# Patient Record
Sex: FEMALE | Race: WHITE | NOT HISPANIC OR LATINO
[De-identification: names, ages, dates, MRNs, and addresses within clinical notes are randomized per-mention and may not be internally consistent; named-entity substitution may affect disease eponyms.]

---

## 2017-01-27 ENCOUNTER — APPOINTMENT (OUTPATIENT)
Dept: ENDOCRINOLOGY | Facility: CLINIC | Age: 72
End: 2017-01-27

## 2017-01-27 VITALS
OXYGEN SATURATION: 98 % | BODY MASS INDEX: 25.4 KG/M2 | HEIGHT: 62 IN | SYSTOLIC BLOOD PRESSURE: 122 MMHG | HEART RATE: 83 BPM | WEIGHT: 138 LBS | DIASTOLIC BLOOD PRESSURE: 58 MMHG

## 2017-01-27 RX ORDER — DENOSUMAB 60 MG/ML
60 INJECTION SUBCUTANEOUS
Qty: 1 | Refills: 0 | Status: COMPLETED | OUTPATIENT
Start: 2017-01-27

## 2017-01-27 RX ADMIN — DENOSUMAB 0 MG/ML: 60 INJECTION SUBCUTANEOUS at 00:00

## 2017-01-31 RX ORDER — DENOSUMAB 60 MG/ML
60 INJECTION SUBCUTANEOUS
Qty: 1 | Refills: 0 | Status: COMPLETED | OUTPATIENT
Start: 2017-01-31

## 2017-01-31 RX ADMIN — DENOSUMAB 0 MG/ML: 60 INJECTION SUBCUTANEOUS at 00:00

## 2017-02-21 ENCOUNTER — OUTPATIENT (OUTPATIENT)
Dept: OUTPATIENT SERVICES | Facility: HOSPITAL | Age: 72
LOS: 1 days | End: 2017-02-21
Payer: MEDICARE

## 2017-02-21 ENCOUNTER — APPOINTMENT (OUTPATIENT)
Dept: MRI IMAGING | Facility: CLINIC | Age: 72
End: 2017-02-21

## 2017-02-21 DIAGNOSIS — Z00.8 ENCOUNTER FOR OTHER GENERAL EXAMINATION: ICD-10-CM

## 2017-02-21 PROCEDURE — 77059 MRI BREAST BILATERAL: CPT | Mod: 26

## 2017-02-21 PROCEDURE — C8908: CPT

## 2017-02-21 PROCEDURE — A9585: CPT

## 2017-06-02 ENCOUNTER — RX RENEWAL (OUTPATIENT)
Age: 72
End: 2017-06-02

## 2017-07-28 ENCOUNTER — APPOINTMENT (OUTPATIENT)
Dept: ENDOCRINOLOGY | Facility: CLINIC | Age: 72
End: 2017-07-28
Payer: MEDICARE

## 2017-07-28 VITALS
BODY MASS INDEX: 24.66 KG/M2 | HEART RATE: 84 BPM | WEIGHT: 134 LBS | DIASTOLIC BLOOD PRESSURE: 50 MMHG | OXYGEN SATURATION: 95 % | HEIGHT: 62 IN | SYSTOLIC BLOOD PRESSURE: 106 MMHG

## 2017-07-28 PROCEDURE — 96372 THER/PROPH/DIAG INJ SC/IM: CPT

## 2017-07-28 PROCEDURE — 99214 OFFICE O/P EST MOD 30 MIN: CPT | Mod: 25

## 2017-07-28 RX ORDER — AZITHROMYCIN 250 MG/1
250 TABLET, FILM COATED ORAL
Qty: 6 | Refills: 0 | Status: COMPLETED | COMMUNITY
Start: 2017-06-02

## 2017-07-28 RX ORDER — MUPIROCIN 20 MG/G
2 OINTMENT TOPICAL
Qty: 22 | Refills: 0 | Status: COMPLETED | COMMUNITY
Start: 2017-02-04

## 2017-07-28 RX ORDER — LEVOFLOXACIN 500 MG/1
500 TABLET, FILM COATED ORAL
Qty: 7 | Refills: 0 | Status: COMPLETED | COMMUNITY
Start: 2017-02-02

## 2017-07-28 RX ORDER — MOMETASONE 50 UG/1
50 SPRAY, METERED NASAL
Qty: 17 | Refills: 0 | Status: COMPLETED | COMMUNITY
Start: 2017-02-02

## 2017-07-28 RX ORDER — BENZONATATE 100 MG/1
100 CAPSULE ORAL
Qty: 45 | Refills: 0 | Status: COMPLETED | COMMUNITY
Start: 2017-06-02

## 2017-07-28 RX ORDER — METHENAMINE HIPPURATE 1 G/1
1 TABLET ORAL
Qty: 90 | Refills: 0 | Status: ACTIVE | COMMUNITY
Start: 2017-07-07

## 2017-07-28 RX ORDER — PROMETHAZINE HYDROCHLORIDE AND CODEINE PHOSPHATE 6.25; 1 MG/5ML; MG/5ML
6.25-1 SOLUTION ORAL
Qty: 70 | Refills: 0 | Status: COMPLETED | COMMUNITY
Start: 2017-02-02

## 2017-07-28 RX ORDER — CLARITHROMYCIN 500 MG/1
500 TABLET, FILM COATED ORAL
Qty: 14 | Refills: 0 | Status: COMPLETED | COMMUNITY
Start: 2017-02-10

## 2017-07-28 RX ORDER — DENOSUMAB 60 MG/ML
60 INJECTION SUBCUTANEOUS
Qty: 1 | Refills: 0 | Status: COMPLETED | OUTPATIENT
Start: 2017-07-28

## 2017-07-28 RX ADMIN — DENOSUMAB 0 MG/ML: 60 INJECTION SUBCUTANEOUS at 00:00

## 2017-07-31 LAB
25(OH)D3 SERPL-MCNC: 28.4 NG/ML
ALBUMIN SERPL ELPH-MCNC: 4.8 G/DL
ALP BLD-CCNC: 91 U/L
ALT SERPL-CCNC: 58 U/L
ANION GAP SERPL CALC-SCNC: 17 MMOL/L
AST SERPL-CCNC: 61 U/L
BILIRUB SERPL-MCNC: 0.8 MG/DL
BUN SERPL-MCNC: 12 MG/DL
CALCIUM SERPL-MCNC: 9.7 MG/DL
CALCIUM SERPL-MCNC: 9.7 MG/DL
CHLORIDE SERPL-SCNC: 104 MMOL/L
CO2 SERPL-SCNC: 22 MMOL/L
CREAT SERPL-MCNC: 0.81 MG/DL
GLUCOSE SERPL-MCNC: 100 MG/DL
MAGNESIUM SERPL-MCNC: 2.1 MG/DL
PARATHYROID HORMONE INTACT: 64 PG/ML
POTASSIUM SERPL-SCNC: 4.7 MMOL/L
PROT SERPL-MCNC: 7.6 G/DL
SODIUM SERPL-SCNC: 143 MMOL/L
TSH SERPL-ACNC: 2.87 UIU/ML

## 2017-08-01 ENCOUNTER — APPOINTMENT (OUTPATIENT)
Dept: MAMMOGRAPHY | Facility: CLINIC | Age: 72
End: 2017-08-01
Payer: MEDICARE

## 2017-08-01 ENCOUNTER — OUTPATIENT (OUTPATIENT)
Dept: OUTPATIENT SERVICES | Facility: HOSPITAL | Age: 72
LOS: 1 days | End: 2017-08-01
Payer: MEDICARE

## 2017-08-01 ENCOUNTER — APPOINTMENT (OUTPATIENT)
Dept: ULTRASOUND IMAGING | Facility: CLINIC | Age: 72
End: 2017-08-01
Payer: MEDICARE

## 2017-08-01 DIAGNOSIS — Z00.8 ENCOUNTER FOR OTHER GENERAL EXAMINATION: ICD-10-CM

## 2017-08-01 PROCEDURE — 77063 BREAST TOMOSYNTHESIS BI: CPT | Mod: 26

## 2017-08-01 PROCEDURE — 77063 BREAST TOMOSYNTHESIS BI: CPT

## 2017-08-01 PROCEDURE — G0202: CPT | Mod: 26

## 2017-08-01 PROCEDURE — 76641 ULTRASOUND BREAST COMPLETE: CPT | Mod: 26,50

## 2017-08-01 PROCEDURE — 77067 SCR MAMMO BI INCL CAD: CPT

## 2017-08-01 PROCEDURE — 76641 ULTRASOUND BREAST COMPLETE: CPT

## 2017-08-21 ENCOUNTER — RX RENEWAL (OUTPATIENT)
Age: 72
End: 2017-08-21

## 2017-09-06 ENCOUNTER — APPOINTMENT (OUTPATIENT)
Dept: ENDOCRINOLOGY | Facility: CLINIC | Age: 72
End: 2017-09-06

## 2017-11-13 ENCOUNTER — APPOINTMENT (OUTPATIENT)
Dept: SURGERY | Facility: CLINIC | Age: 72
End: 2017-11-13
Payer: MEDICARE

## 2017-11-13 PROCEDURE — 99213K: CUSTOM

## 2018-01-30 ENCOUNTER — OUTPATIENT (OUTPATIENT)
Dept: OUTPATIENT SERVICES | Facility: HOSPITAL | Age: 73
LOS: 1 days | End: 2018-01-30
Payer: MEDICARE

## 2018-01-30 ENCOUNTER — APPOINTMENT (OUTPATIENT)
Dept: MRI IMAGING | Facility: CLINIC | Age: 73
End: 2018-01-30
Payer: MEDICARE

## 2018-01-30 DIAGNOSIS — Z00.8 ENCOUNTER FOR OTHER GENERAL EXAMINATION: ICD-10-CM

## 2018-01-30 PROCEDURE — 77059 MRI BREAST BILATERAL: CPT | Mod: 26

## 2018-01-30 PROCEDURE — 82565 ASSAY OF CREATININE: CPT

## 2018-01-30 PROCEDURE — A9585: CPT

## 2018-01-30 PROCEDURE — C8937: CPT

## 2018-01-30 PROCEDURE — 0159T: CPT | Mod: 26

## 2018-01-30 PROCEDURE — C8908: CPT

## 2018-02-09 ENCOUNTER — LABORATORY RESULT (OUTPATIENT)
Age: 73
End: 2018-02-09

## 2018-02-09 ENCOUNTER — APPOINTMENT (OUTPATIENT)
Dept: ENDOCRINOLOGY | Facility: CLINIC | Age: 73
End: 2018-02-09
Payer: MEDICARE

## 2018-02-09 VITALS
DIASTOLIC BLOOD PRESSURE: 50 MMHG | WEIGHT: 133 LBS | SYSTOLIC BLOOD PRESSURE: 100 MMHG | HEART RATE: 91 BPM | BODY MASS INDEX: 24.17 KG/M2 | HEIGHT: 62.25 IN | OXYGEN SATURATION: 98 %

## 2018-02-09 PROCEDURE — 96372 THER/PROPH/DIAG INJ SC/IM: CPT

## 2018-02-09 PROCEDURE — ZZZZZ: CPT

## 2018-02-09 PROCEDURE — 99214 OFFICE O/P EST MOD 30 MIN: CPT | Mod: 25

## 2018-02-09 PROCEDURE — 77080 DXA BONE DENSITY AXIAL: CPT | Mod: GA

## 2018-02-09 RX ORDER — DENOSUMAB 60 MG/ML
60 INJECTION SUBCUTANEOUS
Qty: 0 | Refills: 0 | Status: COMPLETED | OUTPATIENT
Start: 2018-02-09

## 2018-02-09 RX ORDER — BROMPHENIRAMINE MALEATE, PSEUDOEPHEDRINE HYDROCHLORIDE, 2; 30; 10 MG/5ML; MG/5ML; MG/5ML
30-2-10 SYRUP ORAL
Qty: 200 | Refills: 0 | Status: COMPLETED | COMMUNITY
Start: 2017-12-11

## 2018-02-09 RX ORDER — DENOSUMAB 60 MG/ML
60 INJECTION SUBCUTANEOUS
Refills: 0 | Status: ACTIVE | COMMUNITY

## 2018-02-09 RX ADMIN — DENOSUMAB 0 MG/ML: 60 INJECTION SUBCUTANEOUS at 00:00

## 2018-02-12 LAB
25(OH)D3 SERPL-MCNC: 44.6 NG/ML
ALBUMIN SERPL ELPH-MCNC: 4.6 G/DL
ALP BLD-CCNC: 79 U/L
ALT SERPL-CCNC: 26 U/L
ANION GAP SERPL CALC-SCNC: 16 MMOL/L
AST SERPL-CCNC: 31 U/L
BILIRUB SERPL-MCNC: 0.9 MG/DL
BUN SERPL-MCNC: 15 MG/DL
CALCIUM SERPL-MCNC: 9 MG/DL
CALCIUM SERPL-MCNC: 9 MG/DL
CHLORIDE SERPL-SCNC: 102 MMOL/L
CO2 SERPL-SCNC: 23 MMOL/L
CREAT SERPL-MCNC: 0.82 MG/DL
GLUCOSE SERPL-MCNC: 76 MG/DL
PARATHYROID HORMONE INTACT: 61 PG/ML
POTASSIUM SERPL-SCNC: 4.9 MMOL/L
PROT SERPL-MCNC: 7.4 G/DL
SODIUM SERPL-SCNC: 141 MMOL/L
T3RU NFR SERPL: 1.03 INDEX
T4 SERPL-MCNC: 6.6 UG/DL
TSH SERPL-ACNC: 2.44 UIU/ML

## 2018-04-22 ENCOUNTER — TRANSCRIPTION ENCOUNTER (OUTPATIENT)
Age: 73
End: 2018-04-22

## 2018-05-29 ENCOUNTER — RX RENEWAL (OUTPATIENT)
Age: 73
End: 2018-05-29

## 2018-08-03 ENCOUNTER — APPOINTMENT (OUTPATIENT)
Dept: MAMMOGRAPHY | Facility: CLINIC | Age: 73
End: 2018-08-03

## 2018-08-03 ENCOUNTER — APPOINTMENT (OUTPATIENT)
Dept: ULTRASOUND IMAGING | Facility: CLINIC | Age: 73
End: 2018-08-03

## 2018-08-07 ENCOUNTER — APPOINTMENT (OUTPATIENT)
Dept: ULTRASOUND IMAGING | Facility: CLINIC | Age: 73
End: 2018-08-07
Payer: MEDICARE

## 2018-08-07 ENCOUNTER — APPOINTMENT (OUTPATIENT)
Dept: MAMMOGRAPHY | Facility: CLINIC | Age: 73
End: 2018-08-07
Payer: MEDICARE

## 2018-08-07 ENCOUNTER — OUTPATIENT (OUTPATIENT)
Dept: OUTPATIENT SERVICES | Facility: HOSPITAL | Age: 73
LOS: 1 days | End: 2018-08-07
Payer: MEDICARE

## 2018-08-07 DIAGNOSIS — Z00.8 ENCOUNTER FOR OTHER GENERAL EXAMINATION: ICD-10-CM

## 2018-08-07 PROCEDURE — 77063 BREAST TOMOSYNTHESIS BI: CPT | Mod: 26

## 2018-08-07 PROCEDURE — 76641 ULTRASOUND BREAST COMPLETE: CPT | Mod: 26,50

## 2018-08-07 PROCEDURE — 76641 ULTRASOUND BREAST COMPLETE: CPT

## 2018-08-07 PROCEDURE — 77067 SCR MAMMO BI INCL CAD: CPT

## 2018-08-07 PROCEDURE — 77063 BREAST TOMOSYNTHESIS BI: CPT

## 2018-08-07 PROCEDURE — 77067 SCR MAMMO BI INCL CAD: CPT | Mod: 26

## 2018-08-10 ENCOUNTER — APPOINTMENT (OUTPATIENT)
Dept: ENDOCRINOLOGY | Facility: CLINIC | Age: 73
End: 2018-08-10
Payer: MEDICARE

## 2018-08-10 VITALS
DIASTOLIC BLOOD PRESSURE: 50 MMHG | OXYGEN SATURATION: 95 % | SYSTOLIC BLOOD PRESSURE: 106 MMHG | HEART RATE: 84 BPM | BODY MASS INDEX: 24.71 KG/M2 | HEIGHT: 62.25 IN | WEIGHT: 136 LBS

## 2018-08-10 PROCEDURE — 96372 THER/PROPH/DIAG INJ SC/IM: CPT

## 2018-08-10 PROCEDURE — 99214 OFFICE O/P EST MOD 30 MIN: CPT | Mod: 25

## 2018-08-10 RX ORDER — DENOSUMAB 60 MG/ML
60 INJECTION SUBCUTANEOUS
Qty: 0 | Refills: 0 | Status: COMPLETED | OUTPATIENT
Start: 2018-08-10

## 2018-08-10 RX ADMIN — DENOSUMAB 0 MG/ML: 60 INJECTION SUBCUTANEOUS at 00:00

## 2018-08-13 LAB
25(OH)D3 SERPL-MCNC: 39.6 NG/ML
ALBUMIN SERPL ELPH-MCNC: 4.4 G/DL
ALP BLD-CCNC: 70 U/L
ALT SERPL-CCNC: 33 U/L
ANION GAP SERPL CALC-SCNC: 17 MMOL/L
AST SERPL-CCNC: 40 U/L
BILIRUB SERPL-MCNC: 0.8 MG/DL
BUN SERPL-MCNC: 14 MG/DL
CALCIUM SERPL-MCNC: 8.8 MG/DL
CALCIUM SERPL-MCNC: 8.8 MG/DL
CHLORIDE SERPL-SCNC: 101 MMOL/L
CO2 SERPL-SCNC: 22 MMOL/L
CREAT SERPL-MCNC: 0.8 MG/DL
GLUCOSE SERPL-MCNC: 78 MG/DL
PARATHYROID HORMONE INTACT: 68 PG/ML
POTASSIUM SERPL-SCNC: 4.5 MMOL/L
PROT SERPL-MCNC: 6.9 G/DL
SODIUM SERPL-SCNC: 140 MMOL/L

## 2018-11-14 ENCOUNTER — APPOINTMENT (OUTPATIENT)
Dept: SURGERY | Facility: CLINIC | Age: 73
End: 2018-11-14
Payer: MEDICARE

## 2018-11-14 PROCEDURE — 99213K: CUSTOM

## 2019-02-13 ENCOUNTER — APPOINTMENT (OUTPATIENT)
Dept: ENDOCRINOLOGY | Facility: CLINIC | Age: 74
End: 2019-02-13
Payer: MEDICARE

## 2019-02-13 VITALS
OXYGEN SATURATION: 98 % | SYSTOLIC BLOOD PRESSURE: 112 MMHG | WEIGHT: 133.31 LBS | HEART RATE: 84 BPM | DIASTOLIC BLOOD PRESSURE: 58 MMHG | BODY MASS INDEX: 24.22 KG/M2 | HEIGHT: 62.25 IN

## 2019-02-13 PROCEDURE — 96372 THER/PROPH/DIAG INJ SC/IM: CPT

## 2019-02-13 PROCEDURE — 99214 OFFICE O/P EST MOD 30 MIN: CPT | Mod: 25

## 2019-02-13 RX ORDER — DENOSUMAB 60 MG/ML
60 INJECTION SUBCUTANEOUS
Qty: 1 | Refills: 0 | Status: COMPLETED | OUTPATIENT
Start: 2019-02-13

## 2019-02-13 RX ADMIN — DENOSUMAB 0 MG/ML: 60 INJECTION SUBCUTANEOUS at 00:00

## 2019-02-14 ENCOUNTER — OUTPATIENT (OUTPATIENT)
Dept: OUTPATIENT SERVICES | Facility: HOSPITAL | Age: 74
LOS: 1 days | End: 2019-02-14
Payer: MEDICARE

## 2019-02-14 ENCOUNTER — APPOINTMENT (OUTPATIENT)
Dept: MRI IMAGING | Facility: CLINIC | Age: 74
End: 2019-02-14
Payer: MEDICARE

## 2019-02-14 DIAGNOSIS — Z00.8 ENCOUNTER FOR OTHER GENERAL EXAMINATION: ICD-10-CM

## 2019-02-14 PROCEDURE — A9585: CPT

## 2019-02-14 PROCEDURE — 77049 MRI BREAST C-+ W/CAD BI: CPT | Mod: 26

## 2019-02-14 PROCEDURE — C8937: CPT

## 2019-02-14 PROCEDURE — C8908: CPT

## 2019-02-14 NOTE — ASSESSMENT
[FreeTextEntry1] : 73 year-old female with \par \par 1. Postmenopausal osteoporosis: h/o low bone density. Last dose of IV Reclast 2012. BMD significantly decreased in femoral neck and total hip vs prior study. Options of therapy reviewed. Pt initially elected to take Reclast as she tolerated this well in the past. Referred to rheumatology but decided not to take Reclast on advice of DDS. Began Prolia January 2017 tolerating well. No ONJ. No thigh pain. No interval fx. Repeat bone density 2018 stabilized. Continue Prolia buy and bill. Repeat BMD in Feb 2020.\par \par 2. Small goiter on exam: pt clinically and chemically euthyroid. US shows no nodules. No local neck pain or dysphagia. TFTs Feb 2018 normal. \par \par 3. H/o low Vit D: on Ergo 50,000 per week. \par \par f/u in 6 mons.  [Denosumab Therapy] : Risks  and benefits of denosumab therapy were discussed with the patient including eczema, cellulitis, osteonecrosis of the jaw and atypical femur fractures

## 2019-02-14 NOTE — PROCEDURE
[FreeTextEntry1] : Bone mineral density 2//9/18\par indication:  assess response to medication prolia \par spine -2.1 osteopenia but Not accurate because of arthritis no significant change \par total hip -2.5 osteoporosis no significant change \par femoral neck -2.4 osteopenia +3.7%\par proximal radius -2.3.onnsc \par \par Bone mineral density August 17, 2016\par 2 year follow-up\par Spine -2.1, osteopenia, no significant change \par Total hi[p -2.6, osteoporosis, -4.6% \par Femoral neck -2.6, osteoporosis, -8.6%\par Proximal radius -2.5, osteoporosis, no significant change\par \par thyroid US 5/22/15\par small goiter, heterogenous, no nodules\par \par bone mineral density performed May 20, 2014\par Indication assess response to intravenous therapy\par Spine -2.2, osteopenia, no significant change versus 2013\par Total hip -2.4, osteopenia, no significant change versus 2013\par Femoral neck - 2.2, osteopenia, no significant change versus 2013\par Proximal radius -2.8, osteoporosis, no significant change versus 2013

## 2019-02-14 NOTE — PHYSICAL EXAM
[Alert] : alert [No Acute Distress] : no acute distress [Well Nourished] : well nourished [Well Developed] : well developed [Normal Sclera/Conjunctiva] : normal sclera/conjunctiva [No Proptosis] : no proptosis [Normal Oropharynx] : the oropharynx was normal [No Thyroid Nodules] : there were no palpable thyroid nodules [No Respiratory Distress] : no respiratory distress [No Accessory Muscle Use] : no accessory muscle use [Clear to Auscultation] : lungs were clear to auscultation bilaterally [Normal Rate] : heart rate was normal  [Normal S1, S2] : normal S1 and S2 [Regular Rhythm] : with a regular rhythm [Normal Bowel Sounds] : normal bowel sounds [Not Tender] : non-tender [Soft] : abdomen soft [Not Distended] : not distended [Anterior Cervical Nodes] : anterior cervical nodes [Normal] : normal and non tender [No Spinal Tenderness] : no spinal tenderness [Spine Straight] : spine straight [No Stigmata of Cushings Syndrome] : no stigmata of cushings syndrome [Normal Gait] : normal gait [Normal Strength/Tone] : muscle strength and tone were normal [No Rash] : no rash [Normal Reflexes] : deep tendon reflexes were 2+ and symmetric [No Tremors] : no tremors [Oriented x3] : oriented to person, place, and time [de-identified] : minimal goiter.

## 2019-02-14 NOTE — HISTORY OF PRESENT ILLNESS
[Zoledronic Acid (Zometa)] : Zoledronic Acid [Patient taking Meds Correctly] : Patient is taking meds correctly [Prolia (Denosumab)] : Prolia (Denosumab) [FreeTextEntry1] : f/u for 74 y/o female for osteoporosis. \par \par s/p 2 doses Reclast last 12/2012. No interval fx. BMD 8/2016 decreased. Changed to Prolia 1/2017 tolerating well. No thigh pain. No interval fx. BMD 2018 stabilized. Last DDS 1 month ago. No ONJ. c/o occ R hip arthritic pain. Active GERD, has recent EGD, not ideal for oral treatment. \par \par Small goiter. Clinically euthyroid. Takes chewable calcium\par \par H/o of low Vit D on 50,000/wk and Vit D 2000IU daily.

## 2019-03-25 ENCOUNTER — APPOINTMENT (OUTPATIENT)
Dept: PULMONOLOGY | Facility: CLINIC | Age: 74
End: 2019-03-25
Payer: MEDICARE

## 2019-03-25 VITALS
SYSTOLIC BLOOD PRESSURE: 118 MMHG | HEART RATE: 76 BPM | TEMPERATURE: 97.9 F | DIASTOLIC BLOOD PRESSURE: 66 MMHG | OXYGEN SATURATION: 96 %

## 2019-03-25 DIAGNOSIS — Z87.891 PERSONAL HISTORY OF NICOTINE DEPENDENCE: ICD-10-CM

## 2019-03-25 LAB — POCT - HEMOGLOBIN (HGB), QUANTITATIVE, TRANSCUTANEOUS: 12.2

## 2019-03-25 PROCEDURE — 71046 X-RAY EXAM CHEST 2 VIEWS: CPT

## 2019-03-25 PROCEDURE — 88738 HGB QUANT TRANSCUTANEOUS: CPT

## 2019-03-25 PROCEDURE — 94727 GAS DIL/WSHOT DETER LNG VOL: CPT

## 2019-03-25 PROCEDURE — 94060 EVALUATION OF WHEEZING: CPT

## 2019-03-25 PROCEDURE — 94729 DIFFUSING CAPACITY: CPT

## 2019-03-25 PROCEDURE — 99204 OFFICE O/P NEW MOD 45 MIN: CPT | Mod: 25

## 2019-03-26 NOTE — PROCEDURE
[FreeTextEntry1] : PA and lateral chest radiograph reveals\par A normal heart and mediastinum with the exception of a calcified aortic knob. Lung fields are clear bilaterally and there is no significant pleural disease. There is minimal elevation of the left diaphragm with air in the colon. There is no significant pleural disease and bony structures are intact.\par \par Pulmonary function testing.\par Normal Flow Rates There is no significant bronchodilator response. Normal Lung Volumes. There is a mild-mod diffusion impairment Corrects to mild with lung volume correction

## 2019-03-26 NOTE — ASSESSMENT
[FreeTextEntry1] : Shortness of breath predominantly related to abdominal distention and pseudoobstruction. Probable component of mild lung injury related to prior tobacco use manifested in diffusion impairment. Maybe mildly contributory. No significant flow limitation.\par \par I do not see an indication for a bronchodilator therapy or further evaluation in this patient.\par I have recommended followup in one years time or sooner on a p.r.n. basis.

## 2019-03-26 NOTE — HISTORY OF PRESENT ILLNESS
[FreeTextEntry1] : LINDA GONZALEZ is a 74 year old  F referred for pulmonary evaluation for SOB\par \par SOB especially with abdominal distension. Present 3-4 months. Somewhat improved. \par SNOW x 1-2 flights. \par Mild cough relates to GERD. No wheezing. No CP. \par \par Past pulmonary history. Pneumonia once many years ago. Not hosp. No other\par Occupational Exposure. N\par Family history of pulmonary disease. 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

## 2019-03-26 NOTE — PHYSICAL EXAM
[General Appearance - Well Developed] : well developed [General Appearance - Well Nourished] : well nourished [Normal Conjunctiva] : the conjunctiva exhibited no abnormalities [Normal Oropharynx] : normal oropharynx [Jugular Venous Distention Increased] : there was no jugular-venous distention [Heart Sounds] : normal S1 and S2 [Murmurs] : no murmurs present [Abdomen Soft] : soft [Abdomen Tenderness] : non-tender [FreeTextEntry1] : Mild distension. [Nail Clubbing] : no clubbing of the fingernails [Cyanosis, Localized] : no localized cyanosis [Petechial Hemorrhages (___cm)] : no petechial hemorrhages [] : no ischemic changes

## 2019-03-26 NOTE — CONSULT LETTER
[Dear  ___] : Dear ~EBONY, [Consult Letter:] : I had the pleasure of evaluating your patient, [unfilled]. [Please see my note below.] : Please see my note below. [Sincerely,] : Sincerely, [FreeTextEntry2] : Clay Draper MD\par  [FreeTextEntry3] : Tyrone Schaffer MD [facility]\par

## 2019-05-22 ENCOUNTER — RX RENEWAL (OUTPATIENT)
Age: 74
End: 2019-05-22

## 2019-08-08 ENCOUNTER — APPOINTMENT (OUTPATIENT)
Dept: ULTRASOUND IMAGING | Facility: CLINIC | Age: 74
End: 2019-08-08
Payer: MEDICARE

## 2019-08-08 ENCOUNTER — APPOINTMENT (OUTPATIENT)
Dept: MAMMOGRAPHY | Facility: CLINIC | Age: 74
End: 2019-08-08
Payer: MEDICARE

## 2019-08-08 ENCOUNTER — OUTPATIENT (OUTPATIENT)
Dept: OUTPATIENT SERVICES | Facility: HOSPITAL | Age: 74
LOS: 1 days | End: 2019-08-08
Payer: MEDICARE

## 2019-08-08 DIAGNOSIS — Z00.8 ENCOUNTER FOR OTHER GENERAL EXAMINATION: ICD-10-CM

## 2019-08-08 PROCEDURE — 77063 BREAST TOMOSYNTHESIS BI: CPT | Mod: 26

## 2019-08-08 PROCEDURE — 76641 ULTRASOUND BREAST COMPLETE: CPT | Mod: 26,50

## 2019-08-08 PROCEDURE — 77063 BREAST TOMOSYNTHESIS BI: CPT

## 2019-08-08 PROCEDURE — 77067 SCR MAMMO BI INCL CAD: CPT

## 2019-08-08 PROCEDURE — 76641 ULTRASOUND BREAST COMPLETE: CPT

## 2019-08-08 PROCEDURE — 77067 SCR MAMMO BI INCL CAD: CPT | Mod: 26

## 2019-08-14 ENCOUNTER — APPOINTMENT (OUTPATIENT)
Dept: ENDOCRINOLOGY | Facility: CLINIC | Age: 74
End: 2019-08-14
Payer: MEDICARE

## 2019-08-14 VITALS
SYSTOLIC BLOOD PRESSURE: 106 MMHG | RESPIRATION RATE: 18 BRPM | DIASTOLIC BLOOD PRESSURE: 60 MMHG | HEART RATE: 68 BPM | OXYGEN SATURATION: 96 %

## 2019-08-14 PROCEDURE — 96372 THER/PROPH/DIAG INJ SC/IM: CPT

## 2019-08-14 PROCEDURE — 99212 OFFICE O/P EST SF 10 MIN: CPT | Mod: 25

## 2019-08-14 RX ORDER — DENOSUMAB 60 MG/ML
60 INJECTION SUBCUTANEOUS
Qty: 1 | Refills: 0 | Status: COMPLETED | OUTPATIENT
Start: 2019-08-14

## 2019-08-14 RX ADMIN — DENOSUMAB 0 MG/ML: 60 INJECTION SUBCUTANEOUS at 00:00

## 2019-11-25 ENCOUNTER — APPOINTMENT (OUTPATIENT)
Dept: SURGERY | Facility: CLINIC | Age: 74
End: 2019-11-25
Payer: COMMERCIAL

## 2019-11-25 PROCEDURE — 99213K: CUSTOM

## 2020-02-24 ENCOUNTER — APPOINTMENT (OUTPATIENT)
Dept: ENDOCRINOLOGY | Facility: CLINIC | Age: 75
End: 2020-02-24
Payer: MEDICARE

## 2020-02-24 VITALS
HEART RATE: 78 BPM | BODY MASS INDEX: 23.62 KG/M2 | HEIGHT: 62.25 IN | WEIGHT: 130 LBS | SYSTOLIC BLOOD PRESSURE: 120 MMHG | OXYGEN SATURATION: 98 % | DIASTOLIC BLOOD PRESSURE: 62 MMHG

## 2020-02-24 PROCEDURE — 99214 OFFICE O/P EST MOD 30 MIN: CPT | Mod: 25

## 2020-02-24 PROCEDURE — ZZZZZ: CPT

## 2020-02-24 PROCEDURE — 77080 DXA BONE DENSITY AXIAL: CPT | Mod: GA

## 2020-02-24 PROCEDURE — 96372 THER/PROPH/DIAG INJ SC/IM: CPT

## 2020-02-24 RX ORDER — DENOSUMAB 60 MG/ML
60 INJECTION SUBCUTANEOUS
Qty: 1 | Refills: 0 | Status: COMPLETED | OUTPATIENT
Start: 2020-02-24

## 2020-02-24 RX ADMIN — DENOSUMAB 60 MG/ML: 60 INJECTION SUBCUTANEOUS at 00:00

## 2020-02-24 NOTE — PROCEDURE
[FreeTextEntry1] : Bone mineral density: 02/24/2020 \par Indication: vs. 2018\par Spine: -1.7 osteopenia, +5.2%\par Total hip: -2.4 osteopenia, no significant change\par Femoral neck: -1.9 osteopenia, +11.9%\par Proximal radius: -2.2 osteopenia, no significant change, improved vs. 2014 and 2016\par \par Bone mineral density 2//9/18\par indication:  assess response to medication prolia \par spine -2.1 osteopenia but Not accurate because of arthritis no significant change \par total hip -2.5 osteoporosis no significant change \par femoral neck -2.4 osteopenia +3.7%\par proximal radius -2.3.onnsc \par \par Bone mineral density August 17, 2016\par 2 year follow-up\par Spine -2.1, osteopenia, no significant change \par Total hi[p -2.6, osteoporosis, -4.6% \par Femoral neck -2.6, osteoporosis, -8.6%\par Proximal radius -2.5, osteoporosis, no significant change\par \par thyroid US 5/22/15\par small goiter, heterogenous, no nodules\par \par bone mineral density performed May 20, 2014\par Indication assess response to intravenous therapy\par Spine -2.2, osteopenia, no significant change versus 2013\par Total hip -2.4, osteopenia, no significant change versus 2013\par Femoral neck - 2.2, osteopenia, no significant change versus 2013\par Proximal radius -2.8, osteoporosis, no significant change versus 2013

## 2020-02-24 NOTE — PHYSICAL EXAM
[Alert] : alert [Well Nourished] : well nourished [No Acute Distress] : no acute distress [Well Developed] : well developed [Normal Sclera/Conjunctiva] : normal sclera/conjunctiva [Normal Oropharynx] : the oropharynx was normal [No Proptosis] : no proptosis [No Respiratory Distress] : no respiratory distress [No Thyroid Nodules] : there were no palpable thyroid nodules [No Accessory Muscle Use] : no accessory muscle use [Clear to Auscultation] : lungs were clear to auscultation bilaterally [Normal S1, S2] : normal S1 and S2 [Normal Rate] : heart rate was normal  [Regular Rhythm] : with a regular rhythm [Normal Bowel Sounds] : normal bowel sounds [Not Tender] : non-tender [Soft] : abdomen soft [Not Distended] : not distended [Anterior Cervical Nodes] : anterior cervical nodes [Normal] : normal and non tender [No Spinal Tenderness] : no spinal tenderness [Spine Straight] : spine straight [No Stigmata of Cushings Syndrome] : no stigmata of cushings syndrome [Normal Gait] : normal gait [Normal Strength/Tone] : muscle strength and tone were normal [Normal Reflexes] : deep tendon reflexes were 2+ and symmetric [No Rash] : no rash [No Tremors] : no tremors [Oriented x3] : oriented to person, place, and time [de-identified] : minimal goiter

## 2020-02-24 NOTE — HISTORY OF PRESENT ILLNESS
[Zoledronic Acid (Zometa)] : Zoledronic Acid [Patient taking Meds Correctly] : Patient is taking meds correctly [Prolia (Denosumab)] : Prolia (Denosumab) [FreeTextEntry1] : f/u for 73 y/o female for osteoporosis. \par \par s/p 2 doses Reclast last 12/2012. No interval fx. BMD 8/2016 decreased. Changed to Prolia 1/2017. Tolerating well. No thigh pain, no interval fx. Last DDS within past 3 months. No ONJ. BMD 2018 stabilized.\par \par  Active GERD, has recent EGD, not ideal for oral treatment. \par \par Small goiter. Clinically euthyroid. Takes chewable calcium\par \par H/o of low Vit D currently on Vitamin D 2000 UI daily.

## 2020-02-24 NOTE — END OF VISIT
[FreeTextEntry3] : I, Colby Aguirre, authored this note working as a medical scribe for Dr. Diana.  02/24/2020. 12:30PM. This note was authored by the medical scribe for me. I have reviewed, edited, and revised the note as needed. I am in agreement with the exam findings, imaging findings, and treatment plan.  Td Diana MD

## 2020-02-24 NOTE — ASSESSMENT
[Denosumab Therapy] : Risks  and benefits of denosumab therapy were discussed with the patient including eczema, cellulitis, osteonecrosis of the jaw and atypical femur fractures [FreeTextEntry1] : 74 year-old female with \par \par 1. Postmenopausal osteoporosis: h/o low bone density. Last dose of IV Reclast 2012. BMD significantly decreased in femoral neck and total hip vs prior study. Options of therapy reviewed. Pt initially elected to take Reclast as she tolerated this well in the past. Referred to rheumatology but decided not to take Reclast on advice of DDS. Began Prolia January 2017. Tolerating well. No thigh pain, no interval fx. No ONJ. BMD 2018 stabilized. BMD 2/2020 indicates improving osteopenia in spine and femoral neck, stable osteopenia in total hip and proximal radius. BMD results reviewed w/ pt. Continue Prolia, buy and bill.\par \par 2. Small goiter on exam: pt clinically and chemically euthyroid. US shows no nodules. No local neck pain. No dysphagia or dysphonia. No raciness, shakiness, heat/cold intolerance, tiredness, or fatigue. No palpitations, tremors, or sudden weight gain/loss. TFTs Feb 2018 normal. \par \par 3. H/o low Vit D, on Vitamin D 2000 UI.\par \par Request labs sent out.\par \par f/u in 6 months

## 2020-02-26 LAB
25(OH)D3 SERPL-MCNC: 41.9 NG/ML
ALBUMIN SERPL ELPH-MCNC: 4.5 G/DL
ALP BLD-CCNC: 68 U/L
ALT SERPL-CCNC: 20 U/L
ANION GAP SERPL CALC-SCNC: 13 MMOL/L
AST SERPL-CCNC: 22 U/L
BILIRUB SERPL-MCNC: 1 MG/DL
BUN SERPL-MCNC: 18 MG/DL
CALCIUM SERPL-MCNC: 9.2 MG/DL
CALCIUM SERPL-MCNC: 9.2 MG/DL
CHLORIDE SERPL-SCNC: 106 MMOL/L
CO2 SERPL-SCNC: 22 MMOL/L
CREAT SERPL-MCNC: 0.81 MG/DL
GLUCOSE SERPL-MCNC: 96 MG/DL
PARATHYROID HORMONE INTACT: 46 PG/ML
POTASSIUM SERPL-SCNC: 4.8 MMOL/L
PROT SERPL-MCNC: 6.7 G/DL
SODIUM SERPL-SCNC: 141 MMOL/L
TSH SERPL-ACNC: 2.46 UIU/ML

## 2020-03-24 ENCOUNTER — RX RENEWAL (OUTPATIENT)
Age: 75
End: 2020-03-24

## 2020-08-10 ENCOUNTER — APPOINTMENT (OUTPATIENT)
Dept: ULTRASOUND IMAGING | Facility: CLINIC | Age: 75
End: 2020-08-10
Payer: MEDICARE

## 2020-08-10 ENCOUNTER — OUTPATIENT (OUTPATIENT)
Dept: OUTPATIENT SERVICES | Facility: HOSPITAL | Age: 75
LOS: 1 days | End: 2020-08-10
Payer: MEDICARE

## 2020-08-10 ENCOUNTER — RESULT REVIEW (OUTPATIENT)
Age: 75
End: 2020-08-10

## 2020-08-10 ENCOUNTER — APPOINTMENT (OUTPATIENT)
Dept: MAMMOGRAPHY | Facility: CLINIC | Age: 75
End: 2020-08-10
Payer: MEDICARE

## 2020-08-10 DIAGNOSIS — Z12.31 ENCOUNTER FOR SCREENING MAMMOGRAM FOR MALIGNANT NEOPLASM OF BREAST: ICD-10-CM

## 2020-08-10 PROCEDURE — 76641 ULTRASOUND BREAST COMPLETE: CPT | Mod: 26,50

## 2020-08-10 PROCEDURE — 77063 BREAST TOMOSYNTHESIS BI: CPT | Mod: 26

## 2020-08-10 PROCEDURE — 77067 SCR MAMMO BI INCL CAD: CPT | Mod: 26

## 2020-08-10 PROCEDURE — 77067 SCR MAMMO BI INCL CAD: CPT

## 2020-08-10 PROCEDURE — 77063 BREAST TOMOSYNTHESIS BI: CPT

## 2020-08-10 PROCEDURE — 76641 ULTRASOUND BREAST COMPLETE: CPT

## 2020-08-28 ENCOUNTER — APPOINTMENT (OUTPATIENT)
Dept: ENDOCRINOLOGY | Facility: CLINIC | Age: 75
End: 2020-08-28
Payer: MEDICARE

## 2020-08-28 VITALS
SYSTOLIC BLOOD PRESSURE: 120 MMHG | HEIGHT: 62.25 IN | TEMPERATURE: 97.7 F | HEART RATE: 81 BPM | WEIGHT: 128 LBS | OXYGEN SATURATION: 95 % | DIASTOLIC BLOOD PRESSURE: 60 MMHG | BODY MASS INDEX: 23.26 KG/M2

## 2020-08-28 PROCEDURE — 96372 THER/PROPH/DIAG INJ SC/IM: CPT

## 2020-08-28 PROCEDURE — 99214 OFFICE O/P EST MOD 30 MIN: CPT | Mod: 25

## 2020-08-28 RX ORDER — DENOSUMAB 60 MG/ML
60 INJECTION SUBCUTANEOUS
Qty: 1 | Refills: 0 | Status: COMPLETED | OUTPATIENT
Start: 2020-08-28

## 2020-08-28 RX ADMIN — DENOSUMAB 60 MG/ML: 60 INJECTION SUBCUTANEOUS at 00:00

## 2020-08-28 NOTE — HISTORY OF PRESENT ILLNESS
[Zoledronic Acid (Zometa)] : Zoledronic Acid [Patient taking Meds Correctly] : Patient is taking meds correctly [Prolia (Denosumab)] : Prolia (Denosumab) [FreeTextEntry1] : \par s/p 2 doses Reclast last 12/2012. No interval fx. BMD 8/2016 decreased. Changed to Prolia 1/2017. Tolerating well. No thigh pain, no interval fx. Last DDS within past 3 months. No ONJ. BMD 2018 stabilized.\par BMD 2/2020 improved chaya hip \par  Active GERD, has recent EGD, not ideal for oral treatment. \par \par Small goiter. Clinically euthyroid. Takes chewable calcium\par H/o of low Vit D currently on Vitamin D 2000 UI daily.\par  ill bladder Ca

## 2020-08-28 NOTE — ASSESSMENT
[Denosumab Therapy] : Risks  and benefits of denosumab therapy were discussed with the patient including eczema, cellulitis, osteonecrosis of the jaw and atypical femur fractures [FreeTextEntry1] : 74 year-old female with \par \par 1. Postmenopausal osteoporosis: h/o low bone density. Last dose of IV Reclast 2012. BMD significantly decreased in femoral neck and total hip vs prior study. Options of therapy reviewed. Pt initially elected to take Reclast as she tolerated this well in the past. Referred to rheumatology but decided not to take Reclast on advice of DDS. Began Prolia January 2017. Tolerating well. No thigh pain, no interval fx. No ONJ. BMD 2018 stabilized. BMD 2/2020 indicates improving osteopenia in spine and femoral neck, stable osteopenia in total hip and proximal radius. BMD results reviewed w/ pt. \par Continue Prolia, buy and bill.\par \par 2. Small goiter on exam: pt clinically and chemically euthyroid. US shows no nodules. No local neck pain. No dysphagia or dysphonia. No raciness, shakiness, heat/cold intolerance, tiredness, or fatigue. No palpitations, tremors, or sudden weight gain/loss. TFTs Feb 2020 normal. \par \par 3. H/o low Vit D, on Vitamin D 2000 UI. plus 50k once weekly \par \par f/u in 6 months

## 2020-08-28 NOTE — PHYSICAL EXAM
[Alert] : alert [Well Nourished] : well nourished [No Acute Distress] : no acute distress [EOMI] : extra ocular movement intact [Normal Sclera/Conjunctiva] : normal sclera/conjunctiva [Well Developed] : well developed [Normal Oropharynx] : the oropharynx was normal [No Proptosis] : no proptosis [No Thyroid Nodules] : no palpable thyroid nodules [No Respiratory Distress] : no respiratory distress [No Accessory Muscle Use] : no accessory muscle use [Clear to Auscultation] : lungs were clear to auscultation bilaterally [Normal S1, S2] : normal S1 and S2 [Normal Rate] : heart rate was normal [Regular Rhythm] : with a regular rhythm [No Edema] : no peripheral edema [Normal Bowel Sounds] : normal bowel sounds [Not Tender] : non-tender [Not Distended] : not distended [Soft] : abdomen soft [Normal Posterior Cervical Nodes] : no posterior cervical lymphadenopathy [Normal Anterior Cervical Nodes] : no anterior cervical lymphadenopathy [No Stigmata of Cushings Syndrome] : no stigmata of Cushings Syndrome [Spine Straight] : spine straight [No Spinal Tenderness] : no spinal tenderness [Normal Gait] : normal gait [Normal Strength/Tone] : muscle strength and tone were normal [Normal Reflexes] : deep tendon reflexes were 2+ and symmetric [No Rash] : no rash [Oriented x3] : oriented to person, place, and time [No Tremors] : no tremors [Acanthosis Nigricans] : no acanthosis nigricans [de-identified] : minimal goiter

## 2020-11-27 ENCOUNTER — TRANSCRIPTION ENCOUNTER (OUTPATIENT)
Age: 75
End: 2020-11-27

## 2020-12-14 ENCOUNTER — TRANSCRIPTION ENCOUNTER (OUTPATIENT)
Age: 75
End: 2020-12-14

## 2021-01-25 ENCOUNTER — APPOINTMENT (OUTPATIENT)
Dept: SURGERY | Facility: CLINIC | Age: 76
End: 2021-01-25
Payer: MEDICARE

## 2021-01-25 PROCEDURE — 99213K: CUSTOM

## 2021-02-05 ENCOUNTER — RESULT REVIEW (OUTPATIENT)
Age: 76
End: 2021-02-05

## 2021-02-05 ENCOUNTER — APPOINTMENT (OUTPATIENT)
Dept: MRI IMAGING | Facility: CLINIC | Age: 76
End: 2021-02-05
Payer: MEDICARE

## 2021-02-05 ENCOUNTER — OUTPATIENT (OUTPATIENT)
Dept: OUTPATIENT SERVICES | Facility: HOSPITAL | Age: 76
LOS: 1 days | End: 2021-02-05
Payer: MEDICARE

## 2021-02-05 DIAGNOSIS — Z00.8 ENCOUNTER FOR OTHER GENERAL EXAMINATION: ICD-10-CM

## 2021-02-05 PROCEDURE — A9585: CPT

## 2021-02-05 PROCEDURE — C8937: CPT

## 2021-02-05 PROCEDURE — 77049 MRI BREAST C-+ W/CAD BI: CPT | Mod: 26,MH

## 2021-02-05 PROCEDURE — C8908: CPT

## 2021-03-08 ENCOUNTER — RX RENEWAL (OUTPATIENT)
Age: 76
End: 2021-03-08

## 2021-03-12 ENCOUNTER — APPOINTMENT (OUTPATIENT)
Dept: ENDOCRINOLOGY | Facility: CLINIC | Age: 76
End: 2021-03-12
Payer: MEDICARE

## 2021-03-12 VITALS
SYSTOLIC BLOOD PRESSURE: 100 MMHG | BODY MASS INDEX: 23.92 KG/M2 | HEIGHT: 62 IN | TEMPERATURE: 97.8 F | WEIGHT: 130 LBS | OXYGEN SATURATION: 97 % | HEART RATE: 76 BPM | DIASTOLIC BLOOD PRESSURE: 60 MMHG

## 2021-03-12 DIAGNOSIS — E55.9 VITAMIN D DEFICIENCY, UNSPECIFIED: ICD-10-CM

## 2021-03-12 PROCEDURE — 96372 THER/PROPH/DIAG INJ SC/IM: CPT

## 2021-03-12 PROCEDURE — 99214 OFFICE O/P EST MOD 30 MIN: CPT | Mod: 25

## 2021-03-12 RX ORDER — DENOSUMAB 60 MG/ML
60 INJECTION SUBCUTANEOUS
Qty: 1 | Refills: 0 | Status: COMPLETED | OUTPATIENT
Start: 2021-03-12

## 2021-03-12 RX ADMIN — DENOSUMAB 0 MG/ML: 60 INJECTION SUBCUTANEOUS at 00:00

## 2021-03-12 NOTE — PHYSICAL EXAM
[Alert] : alert [Well Nourished] : well nourished [No Acute Distress] : no acute distress [Well Developed] : well developed [Normal Sclera/Conjunctiva] : normal sclera/conjunctiva [EOMI] : extra ocular movement intact [No Proptosis] : no proptosis [No Thyroid Nodules] : no palpable thyroid nodules [Clear to Auscultation] : lungs were clear to auscultation bilaterally [Normal S1, S2] : normal S1 and S2 [Normal Rate] : heart rate was normal [Regular Rhythm] : with a regular rhythm [No Edema] : no peripheral edema [Normal Bowel Sounds] : normal bowel sounds [Not Tender] : non-tender [Not Distended] : not distended [Soft] : abdomen soft [Normal Anterior Cervical Nodes] : no anterior cervical lymphadenopathy [No Spinal Tenderness] : no spinal tenderness [Spine Straight] : spine straight [No Stigmata of Cushings Syndrome] : no stigmata of Cushings Syndrome [Normal Gait] : normal gait [Normal Reflexes] : deep tendon reflexes were 2+ and symmetric [No Tremors] : no tremors [Oriented x3] : oriented to person, place, and time [de-identified] : minimal goiter

## 2021-03-12 NOTE — ASSESSMENT
[Denosumab Therapy] : Risks  and benefits of denosumab therapy were discussed with the patient including eczema, cellulitis, osteonecrosis of the jaw and atypical femur fractures [FreeTextEntry1] : 76 year-old female with \par \par 1. Postmenopausal osteoporosis: h/o low bone density. Last dose of IV Reclast 2012. BMD significantly decreased in femoral neck and total hip vs prior study. Options of therapy reviewed. Pt initially elected to take Reclast as she tolerated this well in the past. Referred to rheumatology but decided not to take Reclast on advice of DDS. Began Prolia January 2017. Tolerating well. No thigh pain, no interval fx. No ONJ. BMD 2018 stabilized. BMD 2/2020 indicates improving osteopenia in spine and femoral neck, stable osteopenia in total hip and proximal radius. Continue Prolia, buy and bill.\par \par 2. Small goiter on exam: pt clinically and chemically euthyroid. US shows no nodules. No local neck pain. No dysphagia or dysphonia. No raciness, shakiness, heat/cold intolerance, tiredness, or fatigue. No palpitations, tremors, or sudden weight gain/loss. TFTs Feb 2020 normal. TUS 3/2021 indicates heterogenous, enlarged, no nodules. Observe.\par \par 3. H/o low Vit D, on Vitamin D 2000 UI. plus 50k once weekly.\par \par F/u in 6 months

## 2021-03-12 NOTE — END OF VISIT
[FreeTextEntry3] : I, Colby Aguirre, authored this note working as a medical scribe for Dr. Diana.  03/12/2021. 11:15AM.  This note was authored by the medical scribe for me. I have reviewed, edited, and revised the note as needed. I am in agreement with the exam findings, imaging findings, and treatment plan.  Td Diana MD

## 2021-03-12 NOTE — HISTORY OF PRESENT ILLNESS
[Zoledronic Acid (Zometa)] : Zoledronic Acid [Prolia (Denosumab)] : Prolia (Denosumab) [FreeTextEntry1] : No significant health change. No interval surgeries, fractures, health change, or change in medications.\par \par s/p 2 doses Reclast last 2012. No interval fx. BMD 2016 decreased. Changed to Prolia 2017. Tolerating well. No thigh pain, no interval fx. Last DDS within past 3 months. No ONJ. BMD 2018 stabilized. BMD 2020 indicates improving osteopenia in spine and femoral neck, stable osteopenia in total hip and proximal radius. \par  Active GERD, has recent EGD, not ideal for oral treatment. \par \par Small goiter. Clinically euthyroid. Takes chewable calcium\par H/o of low Vit D currently on Vitamin D 2000 UI daily.\par  ill bladder Ca, pt  2020.

## 2021-03-12 NOTE — PROCEDURE
[FreeTextEntry1] : Thyroid US - 03/12/2021\par heterogenous, enlarged, no nodules\par \par Bone mineral density: 02/24/2020 \par Indication: vs. 2018\par Spine: -1.7 osteopenia, +5.2%\par Total hip: -2.4 osteopenia, no significant change\par Femoral neck: -1.9 osteopenia, +11.9%\par Proximal radius: -2.2 osteopenia, no significant change, improved vs. 2014 and 2016\par \par Bone mineral density 2//9/18\par indication:  assess response to medication prolia \par spine -2.1 osteopenia but Not accurate because of arthritis no significant change \par total hip -2.5 osteoporosis no significant change \par femoral neck -2.4 osteopenia +3.7%\par proximal radius -2.3.onnsc \par \par Bone mineral density August 17, 2016\par 2 year follow-up\par Spine -2.1, osteopenia, no significant change \par Total hi[p -2.6, osteoporosis, -4.6% \par Femoral neck -2.6, osteoporosis, -8.6%\par Proximal radius -2.5, osteoporosis, no significant change\par \par thyroid US 5/22/15\par small goiter, heterogenous, no nodules\par \par bone mineral density performed May 20, 2014\par Indication assess response to intravenous therapy\par Spine -2.2, osteopenia, no significant change versus 2013\par Total hip -2.4, osteopenia, no significant change versus 2013\par Femoral neck - 2.2, osteopenia, no significant change versus 2013\par Proximal radius -2.8, osteoporosis, no significant change versus 2013

## 2021-08-11 ENCOUNTER — OUTPATIENT (OUTPATIENT)
Dept: OUTPATIENT SERVICES | Facility: HOSPITAL | Age: 76
LOS: 1 days | End: 2021-08-11
Payer: MEDICARE

## 2021-08-11 ENCOUNTER — RESULT REVIEW (OUTPATIENT)
Age: 76
End: 2021-08-11

## 2021-08-11 ENCOUNTER — APPOINTMENT (OUTPATIENT)
Dept: MAMMOGRAPHY | Facility: CLINIC | Age: 76
End: 2021-08-11
Payer: MEDICARE

## 2021-08-11 ENCOUNTER — APPOINTMENT (OUTPATIENT)
Dept: ULTRASOUND IMAGING | Facility: CLINIC | Age: 76
End: 2021-08-11
Payer: MEDICARE

## 2021-08-11 DIAGNOSIS — Z00.8 ENCOUNTER FOR OTHER GENERAL EXAMINATION: ICD-10-CM

## 2021-08-11 PROCEDURE — 77065 DX MAMMO INCL CAD UNI: CPT | Mod: 26,GG,LT

## 2021-08-11 PROCEDURE — 77067 SCR MAMMO BI INCL CAD: CPT | Mod: 26,59

## 2021-08-11 PROCEDURE — 77063 BREAST TOMOSYNTHESIS BI: CPT | Mod: 26,59

## 2021-08-11 PROCEDURE — 77065 DX MAMMO INCL CAD UNI: CPT

## 2021-08-11 PROCEDURE — 76641 ULTRASOUND BREAST COMPLETE: CPT | Mod: 26,50

## 2021-08-11 PROCEDURE — 77067 SCR MAMMO BI INCL CAD: CPT

## 2021-08-11 PROCEDURE — 77063 BREAST TOMOSYNTHESIS BI: CPT

## 2021-08-11 PROCEDURE — 76641 ULTRASOUND BREAST COMPLETE: CPT

## 2021-09-13 ENCOUNTER — APPOINTMENT (OUTPATIENT)
Dept: ENDOCRINOLOGY | Facility: CLINIC | Age: 76
End: 2021-09-13
Payer: MEDICARE

## 2021-09-13 PROCEDURE — 96372 THER/PROPH/DIAG INJ SC/IM: CPT

## 2021-09-13 RX ORDER — DENOSUMAB 60 MG/ML
60 INJECTION SUBCUTANEOUS
Qty: 1 | Refills: 0 | Status: COMPLETED | OUTPATIENT
Start: 2021-09-13

## 2021-09-13 RX ADMIN — DENOSUMAB 60 MG/ML: 60 INJECTION SUBCUTANEOUS at 00:00

## 2021-09-14 LAB
25(OH)D3 SERPL-MCNC: 46.6 NG/ML
ALBUMIN SERPL ELPH-MCNC: 4.3 G/DL
ALP BLD-CCNC: 64 U/L
ALT SERPL-CCNC: 28 U/L
ANION GAP SERPL CALC-SCNC: 15 MMOL/L
AST SERPL-CCNC: 35 U/L
BILIRUB SERPL-MCNC: 1.1 MG/DL
BUN SERPL-MCNC: 15 MG/DL
CALCIUM SERPL-MCNC: 8.9 MG/DL
CHLORIDE SERPL-SCNC: 102 MMOL/L
CO2 SERPL-SCNC: 23 MMOL/L
CREAT SERPL-MCNC: 0.86 MG/DL
GLUCOSE SERPL-MCNC: 94 MG/DL
POTASSIUM SERPL-SCNC: 4.7 MMOL/L
PROT SERPL-MCNC: 6.2 G/DL
SODIUM SERPL-SCNC: 140 MMOL/L

## 2021-11-29 ENCOUNTER — RX RENEWAL (OUTPATIENT)
Age: 76
End: 2021-11-29

## 2021-11-29 ENCOUNTER — APPOINTMENT (OUTPATIENT)
Dept: SURGERY | Facility: CLINIC | Age: 76
End: 2021-11-29
Payer: MEDICARE

## 2021-11-29 PROCEDURE — 99213K: CUSTOM

## 2022-02-18 ENCOUNTER — OUTPATIENT (OUTPATIENT)
Dept: OUTPATIENT SERVICES | Facility: HOSPITAL | Age: 77
LOS: 1 days | End: 2022-02-18
Payer: MEDICARE

## 2022-02-18 ENCOUNTER — APPOINTMENT (OUTPATIENT)
Dept: MRI IMAGING | Facility: CLINIC | Age: 77
End: 2022-02-18
Payer: MEDICARE

## 2022-02-18 DIAGNOSIS — Z00.00 ENCOUNTER FOR GENERAL ADULT MEDICAL EXAMINATION WITHOUT ABNORMAL FINDINGS: ICD-10-CM

## 2022-02-18 PROCEDURE — A9585: CPT

## 2022-02-18 PROCEDURE — 77049 MRI BREAST C-+ W/CAD BI: CPT | Mod: 26,MH

## 2022-02-18 PROCEDURE — C8937: CPT

## 2022-02-18 PROCEDURE — C8908: CPT | Mod: MH

## 2022-03-21 ENCOUNTER — APPOINTMENT (OUTPATIENT)
Dept: ENDOCRINOLOGY | Facility: CLINIC | Age: 77
End: 2022-03-21

## 2022-04-22 ENCOUNTER — TRANSCRIPTION ENCOUNTER (OUTPATIENT)
Age: 77
End: 2022-04-22

## 2022-05-15 ENCOUNTER — NON-APPOINTMENT (OUTPATIENT)
Age: 77
End: 2022-05-15

## 2022-07-08 ENCOUNTER — NON-APPOINTMENT (OUTPATIENT)
Age: 77
End: 2022-07-08

## 2022-07-08 ENCOUNTER — APPOINTMENT (OUTPATIENT)
Dept: PULMONOLOGY | Facility: CLINIC | Age: 77
End: 2022-07-08

## 2022-07-08 VITALS
OXYGEN SATURATION: 92 % | DIASTOLIC BLOOD PRESSURE: 61 MMHG | SYSTOLIC BLOOD PRESSURE: 105 MMHG | TEMPERATURE: 98.1 F | HEART RATE: 85 BPM

## 2022-07-08 DIAGNOSIS — R09.89 OTHER SPECIFIED SYMPTOMS AND SIGNS INVOLVING THE CIRCULATORY AND RESPIRATORY SYSTEMS: ICD-10-CM

## 2022-07-08 DIAGNOSIS — K59.89 OTHER SPECIFIED FUNCTIONAL INTESTINAL DISORDERS: ICD-10-CM

## 2022-07-08 PROCEDURE — 94727 GAS DIL/WSHOT DETER LNG VOL: CPT

## 2022-07-08 PROCEDURE — 94729 DIFFUSING CAPACITY: CPT

## 2022-07-08 PROCEDURE — 94010 BREATHING CAPACITY TEST: CPT

## 2022-07-08 PROCEDURE — ZZZZZ: CPT

## 2022-07-08 PROCEDURE — 99204 OFFICE O/P NEW MOD 45 MIN: CPT | Mod: 25

## 2022-07-08 RX ORDER — SIMVASTATIN 20 MG/1
20 TABLET, FILM COATED ORAL
Refills: 0 | Status: COMPLETED | COMMUNITY
End: 2022-07-08

## 2022-07-08 RX ORDER — FAMOTIDINE 20 MG/1
20 TABLET, FILM COATED ORAL
Qty: 180 | Refills: 0 | Status: ACTIVE | COMMUNITY
Start: 2021-12-07

## 2022-07-08 RX ORDER — CLINDAMYCIN HYDROCHLORIDE 150 MG/1
150 CAPSULE ORAL
Qty: 28 | Refills: 0 | Status: COMPLETED | COMMUNITY
Start: 2022-03-25

## 2022-07-08 RX ORDER — ADHESIVE TAPE 3"X 2.3 YD
50 MCG TAPE, NON-MEDICATED TOPICAL
Refills: 0 | Status: COMPLETED | COMMUNITY
End: 2022-07-08

## 2022-07-08 RX ORDER — NEOMYCIN SULFATE 500 MG/1
500 TABLET ORAL
Qty: 28 | Refills: 0 | Status: COMPLETED | COMMUNITY
Start: 2022-05-19

## 2022-07-08 RX ORDER — CHLORHEXIDINE GLUCONATE, 0.12% ORAL RINSE 1.2 MG/ML
0.12 SOLUTION DENTAL
Qty: 960 | Refills: 0 | Status: COMPLETED | COMMUNITY
Start: 2022-03-25

## 2022-07-08 RX ORDER — SIMVASTATIN 10 MG/1
10 TABLET, FILM COATED ORAL
Qty: 90 | Refills: 0 | Status: ACTIVE | COMMUNITY
Start: 2022-03-03

## 2022-07-10 NOTE — HISTORY OF PRESENT ILLNESS
[Former] : former [TextBox_11] : 2 [TextBox_13] : 22 [YearQuit] : 1990 [FreeTextEntry1] : LINDA GONZALEZ is a 74 year old  F referred for pulmonary evaluation for SOB\par \par SOB especially with abdominal distension. 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 Present for many years. ? progressive. \par Mild cough relates to GERD. No wheezing. No CP. \par \par Past pulmonary history. Pneumonia once many years ago. Not hosp. No other\par Occupational Exposure. N\par Family history of pulmonary disease. 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 Done [facility]. Told WNL.

## 2022-07-10 NOTE — DISCUSSION/SUMMARY
[FreeTextEntry1] : Shortness of breath.  Relatively normal lung function except for diffusion impairment.  Function has been stable.\par Possible component related to abdominal distention and pseudoobstruction with basilar atelectasis.\par Does have basilar crackles and should rule out interstitial lung disease and bronchiectasis.\par

## 2022-07-10 NOTE — PHYSICAL EXAM
[General Appearance - Well Developed] : well developed [Normal Conjunctiva] : the conjunctiva exhibited no abnormalities [General Appearance - Well Nourished] : well nourished [Normal Oropharynx] : normal oropharynx [Jugular Venous Distention Increased] : there was no jugular-venous distention [Heart Sounds] : normal S1 and S2 [Murmurs] : no murmurs present [Abdomen Soft] : soft [Abdomen Tenderness] : non-tender [Nail Clubbing] : no clubbing of the fingernails [Cyanosis, Localized] : no localized cyanosis [Petechial Hemorrhages (___cm)] : no petechial hemorrhages [] : no ischemic changes [FreeTextEntry1] : Mild distension.

## 2022-07-10 NOTE — ASSESSMENT
[FreeTextEntry1] : Obtain high-resolution CT of the chest.\par Further recommendations after review of the study.

## 2022-07-10 NOTE — PROCEDURE
[FreeTextEntry1] : 07/08/2022\par Pulmonary function testing\par Normal Flow Rates Normal Lung Volumes. There is a moderate diffusion impairment. Corrects to mild with lung volume correction

## 2022-07-20 ENCOUNTER — APPOINTMENT (OUTPATIENT)
Dept: ULTRASOUND IMAGING | Facility: CLINIC | Age: 77
End: 2022-07-20

## 2022-07-20 ENCOUNTER — OUTPATIENT (OUTPATIENT)
Dept: OUTPATIENT SERVICES | Facility: HOSPITAL | Age: 77
LOS: 1 days | End: 2022-07-20
Payer: MEDICARE

## 2022-07-20 ENCOUNTER — APPOINTMENT (OUTPATIENT)
Dept: CT IMAGING | Facility: CLINIC | Age: 77
End: 2022-07-20

## 2022-07-20 ENCOUNTER — OUTPATIENT (OUTPATIENT)
Dept: OUTPATIENT SERVICES | Facility: HOSPITAL | Age: 77
LOS: 1 days | End: 2022-07-20

## 2022-07-20 DIAGNOSIS — R14.0 ABDOMINAL DISTENSION (GASEOUS): ICD-10-CM

## 2022-07-20 DIAGNOSIS — R09.89 OTHER SPECIFIED SYMPTOMS AND SIGNS INVOLVING THE CIRCULATORY AND RESPIRATORY SYSTEMS: ICD-10-CM

## 2022-07-20 PROCEDURE — 76856 US EXAM PELVIC COMPLETE: CPT | Mod: 26

## 2022-07-20 PROCEDURE — 76830 TRANSVAGINAL US NON-OB: CPT | Mod: 26

## 2022-07-20 PROCEDURE — 71250 CT THORAX DX C-: CPT | Mod: 26,MH

## 2022-07-20 PROCEDURE — 71250 CT THORAX DX C-: CPT

## 2022-07-20 PROCEDURE — 76830 TRANSVAGINAL US NON-OB: CPT

## 2022-07-20 PROCEDURE — 76856 US EXAM PELVIC COMPLETE: CPT

## 2022-07-21 ENCOUNTER — APPOINTMENT (OUTPATIENT)
Dept: ENDOCRINOLOGY | Facility: CLINIC | Age: 77
End: 2022-07-21

## 2022-07-21 VITALS
HEIGHT: 62 IN | TEMPERATURE: 97.5 F | WEIGHT: 138 LBS | BODY MASS INDEX: 25.4 KG/M2 | OXYGEN SATURATION: 98 % | HEART RATE: 78 BPM | DIASTOLIC BLOOD PRESSURE: 64 MMHG | RESPIRATION RATE: 16 BRPM | SYSTOLIC BLOOD PRESSURE: 140 MMHG

## 2022-07-21 PROCEDURE — ZZZZZ: CPT

## 2022-07-21 PROCEDURE — 77080 DXA BONE DENSITY AXIAL: CPT

## 2022-07-21 PROCEDURE — 99214 OFFICE O/P EST MOD 30 MIN: CPT | Mod: 25

## 2022-07-21 PROCEDURE — 96372 THER/PROPH/DIAG INJ SC/IM: CPT

## 2022-07-21 RX ORDER — DENOSUMAB 60 MG/ML
60 INJECTION SUBCUTANEOUS
Qty: 1 | Refills: 0 | Status: COMPLETED | OUTPATIENT
Start: 2022-07-21

## 2022-07-21 RX ADMIN — DENOSUMAB 60 MG/ML: 60 INJECTION SUBCUTANEOUS at 00:00

## 2022-07-21 NOTE — REASON FOR VISIT
-involving lower lip  -unclear etiology  -monitoring  -interfering with speech   [Follow - Up] : a follow-up visit [Osteoporosis] : osteoporosis [Thyroid nodule/ MNG] : thyroid nodule/ MNG

## 2022-07-22 NOTE — END OF VISIT
[FreeTextEntry3] : This note was written by Ashley Nguyen on ( July 21, 2022) acting as a medical scribe for Dr. Diana. This note was authored by the medical scribe for me. I have reviewed, edited, and revised the note as needed. I am in agreement with the exam findings, imaging findings, and treatment plan.  Td Diana MD

## 2022-07-22 NOTE — HISTORY OF PRESENT ILLNESS
[Zoledronic Acid (Zometa)] : Zoledronic Acid [Prolia (Denosumab)] : Prolia (Denosumab) [FreeTextEntry1] : Patient returns for a follow up visit for osteoporosis. Pt had 2 doses Reclast last 2012. No interval fx.  Changed to Prolia 2017. Tolerating well. No thigh pain, no interval fx. Last DDS within past 3 months. No ONJ.Since the last visit pt has no significant health change. No interval surgeries, fractures, health change, or change in medications.Pt reports having partial implants. \par \par  Active GERD, has recent EGD, not ideal for oral treatment. \par \par Small goiter. Clinically euthyroid. Takes chewable calcium\par H/o of low Vit D currently on Vitamin D 2000 UI daily.\par  ill bladder Ca, pt  2020.

## 2022-07-22 NOTE — PROCEDURE
[FreeTextEntry1] : Bone Density July 21, 2022\par Indication vs 2021 Asses response to medication \par Spine-2.1 osteoporosis -4.8%\par Total Hip-2.5 osteoporosis , no significant change \par Femoral Neck -2.0 osteopenia , no significant change \par Proximal Radius -2.3 osteopenia , no significant change \par \par Thyroid US - 03/12/2021\par heterogenous, enlarged, no nodules\par \par Bone mineral density: 02/24/2020 \par Indication: vs. 2018\par Spine: -1.7 osteopenia, +5.2%\par Total hip: -2.4 osteopenia, no significant change\par Femoral neck: -1.9 osteopenia, +11.9%\par Proximal radius: -2.2 osteopenia, no significant change, improved vs. 2014 and 2016\par \par Bone mineral density 2//9/18\par indication:  assess response to medication prolia \par spine -2.1 osteopenia but Not accurate because of arthritis no significant change \par total hip -2.5 osteoporosis no significant change \par femoral neck -2.4 osteopenia +3.7%\par proximal radius -2.3.onnsc \par \par Bone mineral density August 17, 2016\par 2 year follow-up\par Spine -2.1, osteopenia, no significant change \par Total hi[p -2.6, osteoporosis, -4.6% \par Femoral neck -2.6, osteoporosis, -8.6%\par Proximal radius -2.5, osteoporosis, no significant change\par \par thyroid US 5/22/15\par small goiter, heterogenous, no nodules\par \par bone mineral density performed May 20, 2014\par Indication assess response to intravenous therapy\par Spine -2.2, osteopenia, no significant change versus 2013\par Total hip -2.4, osteopenia, no significant change versus 2013\par Femoral neck - 2.2, osteopenia, no significant change versus 2013\par Proximal radius -2.8, osteoporosis, no significant change versus 2013

## 2022-07-22 NOTE — PHYSICAL EXAM
[Alert] : alert [Well Nourished] : well nourished [No Acute Distress] : no acute distress [Well Developed] : well developed [Normal Sclera/Conjunctiva] : normal sclera/conjunctiva [EOMI] : extra ocular movement intact [No Proptosis] : no proptosis [No Thyroid Nodules] : no palpable thyroid nodules [Clear to Auscultation] : lungs were clear to auscultation bilaterally [Normal S1, S2] : normal S1 and S2 [Normal Rate] : heart rate was normal [Regular Rhythm] : with a regular rhythm [No Edema] : no peripheral edema [Normal Bowel Sounds] : normal bowel sounds [Not Tender] : non-tender [Not Distended] : not distended [Soft] : abdomen soft [Normal Anterior Cervical Nodes] : no anterior cervical lymphadenopathy [No Spinal Tenderness] : no spinal tenderness [Spine Straight] : spine straight [No Stigmata of Cushings Syndrome] : no stigmata of Cushings Syndrome [Normal Gait] : normal gait [Normal Reflexes] : deep tendon reflexes were 2+ and symmetric [No Tremors] : no tremors [Oriented x3] : oriented to person, place, and time [Normal Oropharynx] : the oropharynx was normal [Thyroid Not Enlarged] : the thyroid was not enlarged [No Respiratory Distress] : no respiratory distress [No Accessory Muscle Use] : no accessory muscle use [Pedal Pulses Normal] : the pedal pulses are present [Normal Posterior Cervical Nodes] : no posterior cervical lymphadenopathy [Normal Strength/Tone] : muscle strength and tone were normal [No Rash] : no rash [Acanthosis Nigricans] : no acanthosis nigricans [de-identified] : minimal goiter

## 2022-07-22 NOTE — ASSESSMENT
[Denosumab Therapy] : Risks  and benefits of denosumab therapy were discussed with the patient including eczema, cellulitis, osteonecrosis of the jaw and atypical femur fractures [FreeTextEntry1] : 77 year-old female with \par \par 1. Postmenopausal osteoporosis: h/o low bone density. Last dose of IV Reclast 2012. BMD significantly decreased in femoral neck and total hip vs prior study. Options of therapy reviewed. Pt initially elected to take Reclast as she tolerated this well in the past. Referred to rheumatology but decided not to take Reclast on advice of DDS. Began Prolia January 2017. Tolerating well. No thigh pain, no interval fx. No ON . BMD July 21,2022 generally stable except for decrease in spine.  However it appears that the previous value 2020 was probably an outlier in the bone density overall stable in the spine\par \par   Continue Prolia, buy and bill.\par \par 2. Small goiter on exam: pt clinically and chemically euthyroid. US shows no nodules. No local neck pain. No dysphagia or dysphonia. No raciness, shakiness, heat/cold intolerance, tiredness, or fatigue. No palpitations, tremors, or sudden weight gain/loss. TFTs Feb 2020 normal. TUS 3/2021 indicates heterogenous, enlarged, no nodules. Observe.\par \par 3. H/o low Vit D, on Vitamin D 2000 UI. plus 50k once weekly.\par \par Call Dr. Clay Draper for labs \par \par F/u in 6 months

## 2022-08-10 ENCOUNTER — RX RENEWAL (OUTPATIENT)
Age: 77
End: 2022-08-10

## 2022-08-12 ENCOUNTER — APPOINTMENT (OUTPATIENT)
Dept: ULTRASOUND IMAGING | Facility: CLINIC | Age: 77
End: 2022-08-12

## 2022-08-12 ENCOUNTER — APPOINTMENT (OUTPATIENT)
Dept: MAMMOGRAPHY | Facility: CLINIC | Age: 77
End: 2022-08-12

## 2022-08-12 ENCOUNTER — OUTPATIENT (OUTPATIENT)
Dept: OUTPATIENT SERVICES | Facility: HOSPITAL | Age: 77
LOS: 1 days | End: 2022-08-12
Payer: MEDICARE

## 2022-08-12 DIAGNOSIS — Z00.8 ENCOUNTER FOR OTHER GENERAL EXAMINATION: ICD-10-CM

## 2022-08-12 PROCEDURE — G0279: CPT

## 2022-08-12 PROCEDURE — 76641 ULTRASOUND BREAST COMPLETE: CPT

## 2022-08-12 PROCEDURE — G0279: CPT | Mod: 26

## 2022-08-12 PROCEDURE — 77066 DX MAMMO INCL CAD BI: CPT | Mod: 26

## 2022-08-12 PROCEDURE — 77066 DX MAMMO INCL CAD BI: CPT

## 2022-08-12 PROCEDURE — 76641 ULTRASOUND BREAST COMPLETE: CPT | Mod: 26,50

## 2022-10-31 ENCOUNTER — RX RENEWAL (OUTPATIENT)
Age: 77
End: 2022-10-31

## 2022-11-21 ENCOUNTER — APPOINTMENT (OUTPATIENT)
Dept: SURGERY | Facility: CLINIC | Age: 77
End: 2022-11-21

## 2022-11-21 PROCEDURE — 99213K: CUSTOM

## 2023-01-24 ENCOUNTER — APPOINTMENT (OUTPATIENT)
Dept: ENDOCRINOLOGY | Facility: CLINIC | Age: 78
End: 2023-01-24

## 2023-01-27 ENCOUNTER — RX RENEWAL (OUTPATIENT)
Age: 78
End: 2023-01-27

## 2023-02-06 ENCOUNTER — APPOINTMENT (OUTPATIENT)
Dept: PULMONOLOGY | Facility: CLINIC | Age: 78
End: 2023-02-06
Payer: MEDICARE

## 2023-02-06 VITALS — HEART RATE: 88 BPM | SYSTOLIC BLOOD PRESSURE: 113 MMHG | OXYGEN SATURATION: 94 % | DIASTOLIC BLOOD PRESSURE: 68 MMHG

## 2023-02-06 DIAGNOSIS — R93.89 ABNORMAL FINDINGS ON DIAGNOSTIC IMAGING OF OTHER SPECIFIED BODY STRUCTURES: ICD-10-CM

## 2023-02-06 DIAGNOSIS — J47.9 BRONCHIECTASIS, UNCOMPLICATED: ICD-10-CM

## 2023-02-06 DIAGNOSIS — R06.02 SHORTNESS OF BREATH: ICD-10-CM

## 2023-02-06 DIAGNOSIS — Z86.16 PERSONAL HISTORY OF COVID-19: ICD-10-CM

## 2023-02-06 DIAGNOSIS — R91.8 OTHER NONSPECIFIC ABNORMAL FINDING OF LUNG FIELD: ICD-10-CM

## 2023-02-06 LAB — POCT - HEMOGLOBIN (HGB), QUANTITATIVE, TRANSCUTANEOUS: 12.9

## 2023-02-06 PROCEDURE — 99214 OFFICE O/P EST MOD 30 MIN: CPT | Mod: 25

## 2023-02-06 PROCEDURE — 94010 BREATHING CAPACITY TEST: CPT

## 2023-02-06 PROCEDURE — 94729 DIFFUSING CAPACITY: CPT

## 2023-02-06 PROCEDURE — ZZZZZ: CPT

## 2023-02-06 PROCEDURE — 88738 HGB QUANT TRANSCUTANEOUS: CPT

## 2023-02-06 PROCEDURE — 94727 GAS DIL/WSHOT DETER LNG VOL: CPT

## 2023-02-07 PROBLEM — R93.89 ABNORMAL CHEST X-RAY: Status: ACTIVE | Noted: 2023-02-07

## 2023-02-07 PROBLEM — Z86.16 HISTORY OF COVID-19: Status: ACTIVE | Noted: 2023-02-07

## 2023-02-07 NOTE — ASSESSMENT
[FreeTextEntry1] : Obtain CD of recent chest x-ray and prior.\par We will review and make further recommendations.\par Observation.\par Follow-up in 6 months or sooner on a as needed basis.\par \par \par 35 minutes spent in evaluation management and review of studies.\par

## 2023-02-07 NOTE — REVIEW OF SYSTEMS
[As Noted in HPI] : as noted in HPI [GERD] : gerd [Negative] : Psychiatric [TextBox_30] : HPI [TextBox_144] : on Prolia

## 2023-02-07 NOTE — PROCEDURE
[FreeTextEntry1] : CT chest 7/20/2022 reviewed \par \par Pulmonary function testing on February 6, 2023.\par Normal Flow Rates Normal Lung Volumes. There is a mild-mod diffusion impairment \par Compared to prior study of July 8, 2022 there is a very mild decrease in function.\par \par Chest radiograph of January 6, 2023 reports slightly prominent basilar increased interstitial markings.  There is no note of comparison to prior chest radiograph of May 17, 2022.  Films not available on the website or via CD for review at this time.

## 2023-02-07 NOTE — HISTORY OF PRESENT ILLNESS
[Former] : former [TextBox_4] : Here for f/u\par had CT chest 7/2022\par Late 12/22 had COVID. Sent for CXR and told abnormal and referred back.\par Presently mild cough relates to reflux.\par Positive baseline SNOW. No change or better.  [TextBox_11] : 2 [TextBox_13] : 22 [YearQuit] : 1990

## 2023-02-07 NOTE — PHYSICAL EXAM
[General Appearance - Well Developed] : well developed [General Appearance - Well Nourished] : well nourished [Normal Conjunctiva] : the conjunctiva exhibited no abnormalities [Normal Oropharynx] : normal oropharynx [Jugular Venous Distention Increased] : there was no jugular-venous distention [Heart Sounds] : normal S1 and S2 [Murmurs] : no murmurs present [Abdomen Soft] : soft [Abdomen Tenderness] : non-tender [Nail Clubbing] : no clubbing of the fingernails [Cyanosis, Localized] : no localized cyanosis [Petechial Hemorrhages (___cm)] : no petechial hemorrhages [] : no ischemic changes [FreeTextEntry1] : Mild distension.

## 2023-02-07 NOTE — DISCUSSION/SUMMARY
[FreeTextEntry1] : Radiographic changes likely not of clinical concern.  May be old as previous findings noted on CT.\par Do need to obtain films for review and for comparison.  We will make further recommendations after review.  Likely will observe.\par Shortness of breath.  Stable to improved.  Lung function minimally decreased since last exam.  Did have COVID.  Likely not injury related to COVID.\par Does have basilar crackles but no evidence of interstitial lung disease or bronchiectasis noted on CT.\par

## 2023-02-23 ENCOUNTER — OUTPATIENT (OUTPATIENT)
Dept: OUTPATIENT SERVICES | Facility: HOSPITAL | Age: 78
LOS: 1 days | End: 2023-02-23
Payer: MEDICARE

## 2023-02-23 ENCOUNTER — APPOINTMENT (OUTPATIENT)
Dept: MRI IMAGING | Facility: CLINIC | Age: 78
End: 2023-02-23
Payer: MEDICARE

## 2023-02-23 DIAGNOSIS — Z00.8 ENCOUNTER FOR OTHER GENERAL EXAMINATION: ICD-10-CM

## 2023-02-23 PROCEDURE — C8937: CPT

## 2023-02-23 PROCEDURE — C8908: CPT | Mod: MH

## 2023-02-23 PROCEDURE — A9585: CPT

## 2023-02-23 PROCEDURE — 77049 MRI BREAST C-+ W/CAD BI: CPT | Mod: 26,MH

## 2023-04-03 ENCOUNTER — INPATIENT (INPATIENT)
Facility: HOSPITAL | Age: 78
LOS: 6 days | Discharge: HOME HEALTH | DRG: 871 | End: 2023-04-10
Attending: STUDENT IN AN ORGANIZED HEALTH CARE EDUCATION/TRAINING PROGRAM | Admitting: HOSPITALIST
Payer: MEDICARE

## 2023-04-03 VITALS
SYSTOLIC BLOOD PRESSURE: 117 MMHG | DIASTOLIC BLOOD PRESSURE: 65 MMHG | HEIGHT: 62 IN | TEMPERATURE: 98 F | WEIGHT: 134.04 LBS | RESPIRATION RATE: 16 BRPM | HEART RATE: 96 BPM | OXYGEN SATURATION: 96 %

## 2023-04-03 LAB
ALBUMIN SERPL ELPH-MCNC: 3.5 G/DL — SIGNIFICANT CHANGE UP (ref 3.3–5)
ALBUMIN SERPL ELPH-MCNC: 4 G/DL — SIGNIFICANT CHANGE UP (ref 3.3–5)
ALP SERPL-CCNC: 66 U/L — SIGNIFICANT CHANGE UP (ref 40–120)
ALP SERPL-CCNC: 80 U/L — SIGNIFICANT CHANGE UP (ref 40–120)
ALT FLD-CCNC: 13 U/L — SIGNIFICANT CHANGE UP (ref 10–45)
ALT FLD-CCNC: 15 U/L — SIGNIFICANT CHANGE UP (ref 10–45)
ANION GAP SERPL CALC-SCNC: 23 MMOL/L — HIGH (ref 5–17)
ANION GAP SERPL CALC-SCNC: 25 MMOL/L — HIGH (ref 5–17)
APPEARANCE UR: ABNORMAL
AST SERPL-CCNC: 14 U/L — SIGNIFICANT CHANGE UP (ref 10–40)
AST SERPL-CCNC: 21 U/L — SIGNIFICANT CHANGE UP (ref 10–40)
BACTERIA # UR AUTO: ABNORMAL
BASE EXCESS BLDV CALC-SCNC: -9.6 MMOL/L — LOW (ref -2–3)
BASE EXCESS BLDV CALC-SCNC: -9.7 MMOL/L — LOW (ref -2–3)
BASOPHILS # BLD AUTO: 0 K/UL — SIGNIFICANT CHANGE UP (ref 0–0.2)
BASOPHILS NFR BLD AUTO: 0 % — SIGNIFICANT CHANGE UP (ref 0–2)
BILIRUB SERPL-MCNC: 1.7 MG/DL — HIGH (ref 0.2–1.2)
BILIRUB SERPL-MCNC: 2 MG/DL — HIGH (ref 0.2–1.2)
BILIRUB UR-MCNC: NEGATIVE — SIGNIFICANT CHANGE UP
BUN SERPL-MCNC: 55 MG/DL — HIGH (ref 7–23)
BUN SERPL-MCNC: 56 MG/DL — HIGH (ref 7–23)
BURR CELLS BLD QL SMEAR: PRESENT — SIGNIFICANT CHANGE UP
CA-I SERPL-SCNC: 0.9 MMOL/L — LOW (ref 1.15–1.33)
CA-I SERPL-SCNC: 0.92 MMOL/L — LOW (ref 1.15–1.33)
CALCIUM SERPL-MCNC: 7 MG/DL — LOW (ref 8.4–10.5)
CALCIUM SERPL-MCNC: 7.6 MG/DL — LOW (ref 8.4–10.5)
CHLORIDE BLDV-SCNC: 95 MMOL/L — LOW (ref 96–108)
CHLORIDE BLDV-SCNC: 96 MMOL/L — SIGNIFICANT CHANGE UP (ref 96–108)
CHLORIDE SERPL-SCNC: 92 MMOL/L — LOW (ref 96–108)
CHLORIDE SERPL-SCNC: 92 MMOL/L — LOW (ref 96–108)
CO2 BLDV-SCNC: 16 MMOL/L — LOW (ref 22–26)
CO2 BLDV-SCNC: 19 MMOL/L — LOW (ref 22–26)
CO2 SERPL-SCNC: 14 MMOL/L — LOW (ref 22–31)
CO2 SERPL-SCNC: 17 MMOL/L — LOW (ref 22–31)
COLOR SPEC: YELLOW — SIGNIFICANT CHANGE UP
CREAT SERPL-MCNC: 1.77 MG/DL — HIGH (ref 0.5–1.3)
CREAT SERPL-MCNC: 1.82 MG/DL — HIGH (ref 0.5–1.3)
DIFF PNL FLD: NEGATIVE — SIGNIFICANT CHANGE UP
EGFR: 28 ML/MIN/1.73M2 — LOW
EGFR: 29 ML/MIN/1.73M2 — LOW
ELLIPTOCYTES BLD QL SMEAR: SLIGHT — SIGNIFICANT CHANGE UP
EOSINOPHIL # BLD AUTO: 0 K/UL — SIGNIFICANT CHANGE UP (ref 0–0.5)
EOSINOPHIL NFR BLD AUTO: 0 % — SIGNIFICANT CHANGE UP (ref 0–6)
EPI CELLS # UR: 5 /HPF — SIGNIFICANT CHANGE UP
FLUAV AG NPH QL: SIGNIFICANT CHANGE UP
FLUBV AG NPH QL: SIGNIFICANT CHANGE UP
GAS PNL BLDV: 127 MMOL/L — LOW (ref 136–145)
GAS PNL BLDV: 128 MMOL/L — LOW (ref 136–145)
GAS PNL BLDV: SIGNIFICANT CHANGE UP
GIANT PLATELETS BLD QL SMEAR: PRESENT — SIGNIFICANT CHANGE UP
GLUCOSE BLDV-MCNC: 164 MG/DL — HIGH (ref 70–99)
GLUCOSE BLDV-MCNC: 166 MG/DL — HIGH (ref 70–99)
GLUCOSE SERPL-MCNC: 170 MG/DL — HIGH (ref 70–99)
GLUCOSE SERPL-MCNC: 178 MG/DL — HIGH (ref 70–99)
GLUCOSE UR QL: NEGATIVE — SIGNIFICANT CHANGE UP
HCO3 BLDV-SCNC: 15 MMOL/L — LOW (ref 22–29)
HCO3 BLDV-SCNC: 18 MMOL/L — LOW (ref 22–29)
HCT VFR BLD CALC: 40.3 % — SIGNIFICANT CHANGE UP (ref 34.5–45)
HCT VFR BLDA CALC: 35 % — SIGNIFICANT CHANGE UP (ref 34.5–46.5)
HCT VFR BLDA CALC: 40 % — SIGNIFICANT CHANGE UP (ref 34.5–46.5)
HGB BLD CALC-MCNC: 11.6 G/DL — LOW (ref 11.7–16.1)
HGB BLD CALC-MCNC: 13.2 G/DL — SIGNIFICANT CHANGE UP (ref 11.7–16.1)
HGB BLD-MCNC: 13.4 G/DL — SIGNIFICANT CHANGE UP (ref 11.5–15.5)
HYALINE CASTS # UR AUTO: 11 /LPF — HIGH (ref 0–2)
KETONES UR-MCNC: NEGATIVE — SIGNIFICANT CHANGE UP
LACTATE BLDV-MCNC: 3 MMOL/L — HIGH (ref 0.5–2)
LACTATE BLDV-MCNC: 4.4 MMOL/L — CRITICAL HIGH (ref 0.5–2)
LACTATE SERPL-SCNC: 2.5 MMOL/L — HIGH (ref 0.5–2)
LACTATE SERPL-SCNC: 4.8 MMOL/L — CRITICAL HIGH (ref 0.5–2)
LEUKOCYTE ESTERASE UR-ACNC: NEGATIVE — SIGNIFICANT CHANGE UP
LIDOCAIN IGE QN: 12 U/L — SIGNIFICANT CHANGE UP (ref 7–60)
LYMPHOCYTES # BLD AUTO: 0.24 K/UL — LOW (ref 1–3.3)
LYMPHOCYTES # BLD AUTO: 6 % — LOW (ref 13–44)
MAGNESIUM SERPL-MCNC: 2.4 MG/DL — SIGNIFICANT CHANGE UP (ref 1.6–2.6)
MANUAL SMEAR VERIFICATION: SIGNIFICANT CHANGE UP
MCHC RBC-ENTMCNC: 29.3 PG — SIGNIFICANT CHANGE UP (ref 27–34)
MCHC RBC-ENTMCNC: 33.3 GM/DL — SIGNIFICANT CHANGE UP (ref 32–36)
MCV RBC AUTO: 88 FL — SIGNIFICANT CHANGE UP (ref 80–100)
METAMYELOCYTES # FLD: 9.5 % — HIGH (ref 0–0)
MONOCYTES # BLD AUTO: 0.45 K/UL — SIGNIFICANT CHANGE UP (ref 0–0.9)
MONOCYTES NFR BLD AUTO: 11.2 % — SIGNIFICANT CHANGE UP (ref 2–14)
MYELOCYTES NFR BLD: 2.6 % — HIGH (ref 0–0)
NEUTROPHILS # BLD AUTO: 2.84 K/UL — SIGNIFICANT CHANGE UP (ref 1.8–7.4)
NEUTROPHILS NFR BLD AUTO: 33.6 % — LOW (ref 43–77)
NEUTS BAND # BLD: 37.1 % — HIGH (ref 0–8)
NITRITE UR-MCNC: NEGATIVE — SIGNIFICANT CHANGE UP
OTHER CELLS CSF MANUAL: 5.6 ML/DL — LOW (ref 18–22)
PCO2 BLDV: 28 MMHG — LOW (ref 39–42)
PCO2 BLDV: 43 MMHG — HIGH (ref 39–42)
PH BLDV: 7.22 — LOW (ref 7.32–7.43)
PH BLDV: 7.33 — SIGNIFICANT CHANGE UP (ref 7.32–7.43)
PH UR: 6 — SIGNIFICANT CHANGE UP (ref 5–8)
PLAT MORPH BLD: NORMAL — SIGNIFICANT CHANGE UP
PLATELET # BLD AUTO: 279 K/UL — SIGNIFICANT CHANGE UP (ref 150–400)
PO2 BLDV: 29 MMHG — SIGNIFICANT CHANGE UP (ref 25–45)
PO2 BLDV: 73 MMHG — HIGH (ref 25–45)
POIKILOCYTOSIS BLD QL AUTO: SLIGHT — SIGNIFICANT CHANGE UP
POTASSIUM BLDV-SCNC: 3.6 MMOL/L — SIGNIFICANT CHANGE UP (ref 3.5–5.1)
POTASSIUM BLDV-SCNC: 3.7 MMOL/L — SIGNIFICANT CHANGE UP (ref 3.5–5.1)
POTASSIUM SERPL-MCNC: 3.9 MMOL/L — SIGNIFICANT CHANGE UP (ref 3.5–5.3)
POTASSIUM SERPL-MCNC: 3.9 MMOL/L — SIGNIFICANT CHANGE UP (ref 3.5–5.3)
POTASSIUM SERPL-SCNC: 3.9 MMOL/L — SIGNIFICANT CHANGE UP (ref 3.5–5.3)
POTASSIUM SERPL-SCNC: 3.9 MMOL/L — SIGNIFICANT CHANGE UP (ref 3.5–5.3)
PROT SERPL-MCNC: 6.2 G/DL — SIGNIFICANT CHANGE UP (ref 6–8.3)
PROT SERPL-MCNC: 7.3 G/DL — SIGNIFICANT CHANGE UP (ref 6–8.3)
PROT UR-MCNC: ABNORMAL
RBC # BLD: 4.58 M/UL — SIGNIFICANT CHANGE UP (ref 3.8–5.2)
RBC # FLD: 14.7 % — HIGH (ref 10.3–14.5)
RBC BLD AUTO: ABNORMAL
RBC CASTS # UR COMP ASSIST: 6 /HPF — HIGH (ref 0–4)
RSV RNA NPH QL NAA+NON-PROBE: SIGNIFICANT CHANGE UP
SAO2 % BLDV: 35 % — LOW (ref 67–88)
SAO2 % BLDV: 90.9 % — HIGH (ref 67–88)
SARS-COV-2 RNA SPEC QL NAA+PROBE: SIGNIFICANT CHANGE UP
SODIUM SERPL-SCNC: 131 MMOL/L — LOW (ref 135–145)
SODIUM SERPL-SCNC: 132 MMOL/L — LOW (ref 135–145)
SP GR SPEC: 1.02 — SIGNIFICANT CHANGE UP (ref 1.01–1.02)
UROBILINOGEN FLD QL: NEGATIVE — SIGNIFICANT CHANGE UP
WBC # BLD: 4.01 K/UL — SIGNIFICANT CHANGE UP (ref 3.8–10.5)
WBC # FLD AUTO: 4.01 K/UL — SIGNIFICANT CHANGE UP (ref 3.8–10.5)
WBC UR QL: 27 /HPF — HIGH (ref 0–5)

## 2023-04-03 PROCEDURE — 74176 CT ABD & PELVIS W/O CONTRAST: CPT | Mod: 26,MA

## 2023-04-03 PROCEDURE — 71045 X-RAY EXAM CHEST 1 VIEW: CPT | Mod: 26,59

## 2023-04-03 PROCEDURE — 99285 EMERGENCY DEPT VISIT HI MDM: CPT | Mod: GC

## 2023-04-03 PROCEDURE — 71046 X-RAY EXAM CHEST 2 VIEWS: CPT | Mod: 26

## 2023-04-03 RX ORDER — ACETAMINOPHEN 500 MG
1000 TABLET ORAL ONCE
Refills: 0 | Status: COMPLETED | OUTPATIENT
Start: 2023-04-03 | End: 2023-04-03

## 2023-04-03 RX ORDER — MORPHINE SULFATE 50 MG/1
2 CAPSULE, EXTENDED RELEASE ORAL ONCE
Refills: 0 | Status: DISCONTINUED | OUTPATIENT
Start: 2023-04-03 | End: 2023-04-03

## 2023-04-03 RX ORDER — HYDROMORPHONE HYDROCHLORIDE 2 MG/ML
0.5 INJECTION INTRAMUSCULAR; INTRAVENOUS; SUBCUTANEOUS ONCE
Refills: 0 | Status: DISCONTINUED | OUTPATIENT
Start: 2023-04-03 | End: 2023-04-03

## 2023-04-03 RX ORDER — SODIUM CHLORIDE 9 MG/ML
1000 INJECTION, SOLUTION INTRAVENOUS ONCE
Refills: 0 | Status: COMPLETED | OUTPATIENT
Start: 2023-04-03 | End: 2023-04-03

## 2023-04-03 RX ORDER — CEFEPIME 1 G/1
2000 INJECTION, POWDER, FOR SOLUTION INTRAMUSCULAR; INTRAVENOUS ONCE
Refills: 0 | Status: COMPLETED | OUTPATIENT
Start: 2023-04-03 | End: 2023-04-03

## 2023-04-03 RX ORDER — ONDANSETRON 8 MG/1
4 TABLET, FILM COATED ORAL ONCE
Refills: 0 | Status: COMPLETED | OUTPATIENT
Start: 2023-04-03 | End: 2023-04-03

## 2023-04-03 RX ADMIN — ONDANSETRON 4 MILLIGRAM(S): 8 TABLET, FILM COATED ORAL at 10:45

## 2023-04-03 RX ADMIN — HYDROMORPHONE HYDROCHLORIDE 0.5 MILLIGRAM(S): 2 INJECTION INTRAMUSCULAR; INTRAVENOUS; SUBCUTANEOUS at 16:50

## 2023-04-03 RX ADMIN — CEFEPIME 100 MILLIGRAM(S): 1 INJECTION, POWDER, FOR SOLUTION INTRAMUSCULAR; INTRAVENOUS at 12:53

## 2023-04-03 RX ADMIN — Medication 400 MILLIGRAM(S): at 10:45

## 2023-04-03 RX ADMIN — SODIUM CHLORIDE 1000 MILLILITER(S): 9 INJECTION, SOLUTION INTRAVENOUS at 19:20

## 2023-04-03 RX ADMIN — SODIUM CHLORIDE 1000 MILLILITER(S): 9 INJECTION, SOLUTION INTRAVENOUS at 11:52

## 2023-04-03 RX ADMIN — HYDROMORPHONE HYDROCHLORIDE 0.5 MILLIGRAM(S): 2 INJECTION INTRAMUSCULAR; INTRAVENOUS; SUBCUTANEOUS at 13:49

## 2023-04-03 NOTE — ED ADULT NURSE REASSESSMENT NOTE - NS ED NURSE REASSESS COMMENT FT1
pt wheelchair assisted to bathroom to provide urine sample, pt unable to void at this time, pt returned to room 36L, assisted back into stretcher. pt no acute distress, denies complaints at this time, family remains at bedside.

## 2023-04-03 NOTE — ED ADULT NURSE REASSESSMENT NOTE - NSFALLRSKASSESSDT_ED_ALL_ED
03-Apr-2023 20:40 Complex Repair And Bilobe Flap Text: The defect edges were debeveled with a #15 scalpel blade.  The primary defect was closed partially with a complex linear closure.  Given the location of the remaining defect, shape of the defect and the proximity to free margins a bilobe flap was deemed most appropriate for complete closure of the defect.  Using a sterile surgical marker, an appropriate advancement flap was drawn incorporating the defect and placing the expected incisions within the relaxed skin tension lines where possible.    The area thus outlined was incised deep to adipose tissue with a #15 scalpel blade.  The skin margins were undermined to an appropriate distance in all directions utilizing iris scissors.

## 2023-04-03 NOTE — CONSULT NOTE ADULT - ASSESSMENT
78F hx of intestinal dysmotility with chronic pseudoobstruction, SBO s/p exlap with ?intestinal pexy, hysterectomy, presents with abdominal pain x 5d, found to have chronic intestinal pseudoobsturction on CT. Patient hemodynamically stable, nonperitoneal on exam, with normal WBC, elevated lactated, likely 2/2 dehydration    Recommendation:  - No acute surgical intervention  - medical admission  - NPO, IVF resuscitation  - trend lactate  - NGT placement. f/u CXR  - Rectal decompression with malecot drain placement  - GI consult (Dr. Zepeda)  - discussed with attending Dr. Lau    [place] [facility]  [id number] 78F hx of intestinal dysmotility with chronic pseudoobstruction, SBO s/p exlap with ?intestinal pexy, hysterectomy, presents with abdominal pain x 5d, found to have chronic intestinal pseudoobsturction on CT. Patient hemodynamically stable, nonperitoneal on exam, with normal WBC, elevated lactated, likely 2/2 dehydration    Recommendation:  - No acute surgical intervention  - medical admission  - NPO, IVF resuscitation  - trend lactate  - NGT placement. f/u CXR  - Rectal decompression with malecot drain placement  - Serial abdominal exam  - GI consult (Dr. Zepeda)  - discussed with attending Dr. Lau    [place] [facility]  [id number]

## 2023-04-03 NOTE — ED ADULT NURSE REASSESSMENT NOTE - NS ED NURSE REASSESS COMMENT FT1
pt resting in stretcher following rectal tube and NG tube placement by gen surg MDs, pt no acute distress, denies complaints at this time, pt daughters remain at bedside. pt educated on position of rectal tube, told to avoid sudden or aggressive position changes to avoid tube dislodgement, pt verbalized understanding and compliance

## 2023-04-03 NOTE — CONSULT NOTE ADULT - SUBJECTIVE AND OBJECTIVE BOX
GENERAL SURGERY CONSULT NOTE    Patient is a 78y old  Female who presents with a chief complaint of abdominal pain    HPI:  78 year old female with hx of bladder surgery (age 15), intestinal dysmotility, SBO s/p exlap with intestinal pexy? (, no bowel resection), hysterectomy (), presents with abdominal pain x 5 days. Reports pain in bilateral lower abdomen, associated with nausea and constipation. Pain worsens, abdomen slightly more distended compared to baseline today, which prompted ED visit. Patient with known hx and family hx of intestinal dysmotility, takes miralax at home for constipation. Normally has 2-3 BM daily, but has not had BM at home 3 days. Of note, patient just started having a few episodes of diarrhea in ED. Patient denies fever, chills, CP, SOB. She follows GI Dr. Arredondo outpatient, last colonoscopy 3-4 years ago    10-points review of system performed with pertinent negative and postive findings documented in the HPI     PAST MEDICAL & SURGICAL HISTORY:  Chronic idiopathic pseudo-obstruction of intestine    Breast cancer    FAMILY HISTORY:  : Family history not pertinent as reviewed with the patient and family    SOCIAL HISTORY: No pertinent social history    MEDICATIONS  (STANDING):    MEDICATIONS  (PRN):    Allergies    penicillins (Unknown)  sulfa drugs (Hives)    Intolerances    Vital Signs Last 24 Hrs  T(C): 36.7 (2023 18:22), Max: 36.7 (2023 14:23)  T(F): 98 (2023 18:22), Max: 98 (2023 14:23)  HR: 98 (2023 18:22) (88 - 98)  BP: 105/67 (2023 18:22) (105/67 - 140/60)  BP(mean): --  RR: 16 (2023 20:41) (16 - 18)  SpO2: 96% (2023 20:41) (91% - 97%)    Parameters below as of 2023 20:41  Patient On (Oxygen Delivery Method): nasal cannula  O2 Flow (L/min): 2    Daily Height in cm: 157.48 (2023 09:48)    Daily     Exam:  General: resting in stretcher, NAD  Resp: nonlabored breathing  Abd: midline laparotomy scar; soft, distended, mild LLQ>RLQ TTP; no rebound or guarding  Ext: WWP  Neuro: AAO x 3                        13.4   4.01  )-----------( 279      ( 2023 11:14 )             40.3     04-03    132<L>  |  92<L>  |  56<H>  ----------------------------<  178<H>  3.9   |  17<L>  |  1.82<H>    Ca    7.0<L>      2023 13:57  Mg     2.4     04-03    TPro  6.2  /  Alb  3.5  /  TBili  1.7<H>  /  DBili  x   /  AST  14  /  ALT  13  /  AlkPhos  66  04-03    Urinalysis Basic - ( 2023 15:49 )    Color: Yellow / Appearance: Slightly Turbid / S.021 / pH: x  Gluc: x / Ketone: Negative  / Bili: Negative / Urobili: Negative   Blood: x / Protein: 30 mg/dL / Nitrite: Negative   Leuk Esterase: Negative / RBC: 6 /hpf / WBC 27 /HPF   Sq Epi: x / Non Sq Epi: 5 /hpf / Bacteria: Few    IMAGING STUDIES:  < from: CT Abdomen and Pelvis w/ Oral Cont (23 @ 15:21) >  BOWEL: Oral contrast is present in stomachand duodenum. Second and third   portion of the duodenum are markedly dilated with a transition at the   midline, etiology unclear. In addition, the entirety of small and large   bowel are markedly dilated with the terminal ileum measuring up to 6 cm.   Colon is markedly dilated to the level of the rectum just above the anal   canal without evidence of an obstructing mass. Findings suggest chronic   intestinal pseudoobstruction. Correlate with known history. Images of the   upper abdomen from chest CT 2022 demonstrates similar marked   dilatation of visualized small and large bowel. A short segment of right   colon demonstrates focal wall thickening (2:65, 602:17). Colonoscopy is   recommended to exclude a mass lesion.  PERITONEUM: No ascites.  VESSELS: Atherosclerotic changes.  RETROPERITONEUM/LYMPH NODES: No lymphadenopathy.  ABDOMINAL WALL: Surgical clips overlie the mid abdominal wall and right   lower quadrant.  BONES: Degenerative changes.    IMPRESSION:  Marked dilatation of small and large bowel to the level of the rectum   without evidence of an obstructing mass. Degree of dilatation as   described above suggests chronic intestinal pseudoobstruction. Correlate   with known history. No ascites.    Focal wall thickening inthe right colon for which colonoscopy is   recommended to exclude a mass lesion.    < end of copied text >

## 2023-04-03 NOTE — ED ADULT NURSE REASSESSMENT NOTE - NS ED NURSE REASSESS COMMENT FT1
pt resting comfortably in stretcher, no acute distress, denies complaints at this time, pt daughter at bedside, pending surgery consult

## 2023-04-03 NOTE — ED PROVIDER NOTE - PHYSICAL EXAMINATION
GENERAL: Awake. Alert. NAD. Well nourished.  HEENT: NC/AT, Conjunctiva pink, no scleral icterus. Airway patent.   LUNGS: CTAB. No wheezes or rales noted.  CARDIAC: Chest non-tender to palpation. RRR.  ABDOMEN: No masses noted. Soft, mild ttp throughout, stool palpated, distended, no rebound, no guarding.  EXT: No edema, no calf tenderness, distal pulses 2+ bilaterally  NEURO: A&Ox3. Moving all extremities. Sensation and strength intact throughout.   SKIN: Warm and dry.  PSYCH: Normal affect.

## 2023-04-03 NOTE — ED ADULT NURSE NOTE - ED STAT RN HANDOFF DETAILS
pt report received from DORIE Tavera, pt resting in stretcher, no acute distress, denies complaints at this time, pt family at bedside

## 2023-04-03 NOTE — ED ADULT NURSE NOTE - OBJECTIVE STATEMENT
78 yr old female to ED via EMS with c/o intermittent spasm abd cramping today Pt awake alert and orientedx4 Resp even and onlab Denies chest pain or sob Denies fever or chills Abd dist Denies tenderness HX abd distension per pt "ever since radiation for l Lumpectomy," Breast CA. Pt has hx Hysterectomy, L Breast CA, Bowel obstruction 1987 and pseudo bowel obstruction Norm BM x2 yesterday C/o nausea that worsens with pain. Daughters at bedside.

## 2023-04-03 NOTE — ED PROVIDER NOTE - PROGRESS NOTE DETAILS
Domitila Brandt DO (PGY2): Rectal exam: chaperoned by PA student Tasneem. No hemmorrhoids or fissures noted on external exam. No masses palpated. No palpable hard stool in rectal vault. Brown stool present. Attending note (Cirilo): called by lab, bandemia of 37%; lactate was 3.0 (fluid and repeat ordered); will start empiric antibiotics (cefepime) and continue to monitor, CT pending. Domitila Brandt DO (PGY2): CT showing signs of an obstruction.  Patient with uptrending lactate despite fluids.  Concern for ischemic bowel.  Surgery was consulted.  Pending official recs  Patient status post IV antibiotics. Pt tba.

## 2023-04-03 NOTE — ED PROCEDURE NOTE - PROCEDURE ADDITIONAL DETAILS
Emergency Department Focused Ultrasound performed at patient's bedside for educational purposes. The study was performed by a credentialed ultrasound provider. Dilated bowel noted. Irregular anechoic structure within the liver adjacent to the gallbladder noted. discussed with patient. -Lisandro Alvarado PA-C

## 2023-04-03 NOTE — ED PROVIDER NOTE - ATTENDING CONTRIBUTION TO CARE
Attending Statement (EFREN Kerr MD):    HPI: 78-year-old female with a history of prior bowel obstructions remote history of surgical intervention prior history of breast cancer (early 2000, s/p mastectomy, no active disease per patient) presenting with 1 week of constipation and 2 days of lower abdominal pain.  2 episodes of nonbloody and nonbilious vomiting today.  No fever.    Review of Systems:  -General: no fever   -ENT: no congestion  -Pulmonary: no cough, no shortness of breath  -Cardiac: no chest pain  -Gastrointestinal: + abdominal pain, + nausea, + vomiting, and no diarrhea.  -Genitourinary: no blood or pain with urination  -Musculoskeletal: no back or neck pain  -Skin: no rashes  -Endocrine: No h/o diabetes  -Neurologic: No new weakness or numbness in extremities    All else negative unless otherwise specified elsewhere in this note.    On Physical Exam:  General: well appearing, in NAD, speaking clearly in full sentences and without difficulty; cooperative with exam  HEENT: anicteric sclera, airway patent  Neck: no JVD  Cardiac: regular, s1 s2  Lungs: CTABL  Abdomen: Diffusely distended abdomen mild diffuse tenderness no rebound or guarding  : no bladder tenderness or distension  Skin: intact, no rash  Extremities: no peripheral edema, no gross deformities      MDM: Concern for bowel obstruction especially given history.  Will obtain screening labs/presurgical labs and obtain CT abdomen pelvis with oral and IV contrast.  Disposition pending review of labs and imaging.  Please see progress notes above for updates to ED clinical course and medical decision making.

## 2023-04-03 NOTE — ED PROVIDER NOTE - OBJECTIVE STATEMENT
78-year-old female past medical history of breast cancer in early 2000 status post mastectomy in remission, pseudoobstruction, SBO status post surgery, past surgical history of hysterectomy presenting to the emergency department with 2 days of bilateral lower abdominal cramping associated with 1 week of constipation and 2 episodes of NBNB vomiting today.  Patient also reports nausea.  Patient states she is takes MiraLAX daily for chronic constipation however over the past week was having difficulty with bowel movement.  Patient had Dulcolax yesterday and the day before, last bowel movement was yesterday and today.  Patient denies fever, chills, urinary symptoms.  No diarrhea.  Denies headache, chest pain, SOB.

## 2023-04-03 NOTE — ED PROVIDER NOTE - CLINICAL SUMMARY MEDICAL DECISION MAKING FREE TEXT BOX
78-year-old female past medical history of breast cancer in early 2000 status post mastectomy in remission, pseudoobstruction, SBO status post surgery, past surgical history of hysterectomy presenting to the emergency department with 2 days of bilateral lower abdominal cramping associated with 1 week of constipation and 2 episodes of NBNB vomiting today. Given hx and physical, ddx includes but is not limited to constipation, SBO, metabolic derangement, stercoral proctitis, UTI. Labs, CT, rectal exam, UA, meds, reassess.

## 2023-04-04 DIAGNOSIS — E87.8 OTHER DISORDERS OF ELECTROLYTE AND FLUID BALANCE, NOT ELSEWHERE CLASSIFIED: ICD-10-CM

## 2023-04-04 DIAGNOSIS — Z90.49 ACQUIRED ABSENCE OF OTHER SPECIFIED PARTS OF DIGESTIVE TRACT: Chronic | ICD-10-CM

## 2023-04-04 DIAGNOSIS — R10.9 UNSPECIFIED ABDOMINAL PAIN: ICD-10-CM

## 2023-04-04 DIAGNOSIS — K59.89 OTHER SPECIFIED FUNCTIONAL INTESTINAL DISORDERS: ICD-10-CM

## 2023-04-04 DIAGNOSIS — M81.0 AGE-RELATED OSTEOPOROSIS WITHOUT CURRENT PATHOLOGICAL FRACTURE: ICD-10-CM

## 2023-04-04 DIAGNOSIS — K21.9 GASTRO-ESOPHAGEAL REFLUX DISEASE WITHOUT ESOPHAGITIS: ICD-10-CM

## 2023-04-04 DIAGNOSIS — E78.5 HYPERLIPIDEMIA, UNSPECIFIED: ICD-10-CM

## 2023-04-04 LAB
ALBUMIN SERPL ELPH-MCNC: 3.1 G/DL — LOW (ref 3.3–5)
ALP SERPL-CCNC: 54 U/L — SIGNIFICANT CHANGE UP (ref 40–120)
ALT FLD-CCNC: 10 U/L — SIGNIFICANT CHANGE UP (ref 10–45)
ANION GAP SERPL CALC-SCNC: 19 MMOL/L — HIGH (ref 5–17)
APPEARANCE UR: CLEAR — SIGNIFICANT CHANGE UP
AST SERPL-CCNC: 12 U/L — SIGNIFICANT CHANGE UP (ref 10–40)
BACTERIA # UR AUTO: ABNORMAL
BASE EXCESS BLDV CALC-SCNC: -6.9 MMOL/L — LOW (ref -2–3)
BASOPHILS # BLD AUTO: 0 K/UL — SIGNIFICANT CHANGE UP (ref 0–0.2)
BASOPHILS NFR BLD AUTO: 0 % — SIGNIFICANT CHANGE UP (ref 0–2)
BILIRUB SERPL-MCNC: 1.5 MG/DL — HIGH (ref 0.2–1.2)
BILIRUB UR-MCNC: NEGATIVE — SIGNIFICANT CHANGE UP
BUN SERPL-MCNC: 66 MG/DL — HIGH (ref 7–23)
CA-I SERPL-SCNC: 0.88 MMOL/L — LOW (ref 1.15–1.33)
CALCIUM SERPL-MCNC: 6.6 MG/DL — LOW (ref 8.4–10.5)
CHLORIDE BLDV-SCNC: 102 MMOL/L — SIGNIFICANT CHANGE UP (ref 96–108)
CHLORIDE SERPL-SCNC: 98 MMOL/L — SIGNIFICANT CHANGE UP (ref 96–108)
CO2 BLDV-SCNC: 18 MMOL/L — LOW (ref 22–26)
CO2 SERPL-SCNC: 17 MMOL/L — LOW (ref 22–31)
COLOR SPEC: YELLOW — SIGNIFICANT CHANGE UP
CREAT ?TM UR-MCNC: 59 MG/DL — SIGNIFICANT CHANGE UP
CREAT SERPL-MCNC: 2.06 MG/DL — HIGH (ref 0.5–1.3)
CULTURE RESULTS: SIGNIFICANT CHANGE UP
DIFF PNL FLD: NEGATIVE — SIGNIFICANT CHANGE UP
E COLI DNA BLD POS QL NAA+NON-PROBE: SIGNIFICANT CHANGE UP
EGFR: 24 ML/MIN/1.73M2 — LOW
EOSINOPHIL # BLD AUTO: 0 K/UL — SIGNIFICANT CHANGE UP (ref 0–0.5)
EOSINOPHIL NFR BLD AUTO: 0 % — SIGNIFICANT CHANGE UP (ref 0–6)
EPI CELLS # UR: 5 /HPF — SIGNIFICANT CHANGE UP
GAS PNL BLDV: 131 MMOL/L — LOW (ref 136–145)
GAS PNL BLDV: SIGNIFICANT CHANGE UP
GAS PNL BLDV: SIGNIFICANT CHANGE UP
GLUCOSE BLDC GLUCOMTR-MCNC: 109 MG/DL — HIGH (ref 70–99)
GLUCOSE BLDC GLUCOMTR-MCNC: 121 MG/DL — HIGH (ref 70–99)
GLUCOSE BLDC GLUCOMTR-MCNC: 135 MG/DL — HIGH (ref 70–99)
GLUCOSE BLDV-MCNC: 118 MG/DL — HIGH (ref 70–99)
GLUCOSE SERPL-MCNC: 121 MG/DL — HIGH (ref 70–99)
GLUCOSE UR QL: NEGATIVE — SIGNIFICANT CHANGE UP
GRAM STN FLD: SIGNIFICANT CHANGE UP
HCO3 BLDV-SCNC: 17 MMOL/L — LOW (ref 22–29)
HCT VFR BLD CALC: 31.4 % — LOW (ref 34.5–45)
HCT VFR BLDA CALC: 33 % — LOW (ref 34.5–46.5)
HGB BLD CALC-MCNC: 11 G/DL — LOW (ref 11.7–16.1)
HGB BLD-MCNC: 10.6 G/DL — LOW (ref 11.5–15.5)
HYALINE CASTS # UR AUTO: 7 /LPF — HIGH (ref 0–2)
KETONES UR-MCNC: NEGATIVE — SIGNIFICANT CHANGE UP
LACTATE BLDV-MCNC: 1.7 MMOL/L — SIGNIFICANT CHANGE UP (ref 0.5–2)
LEUKOCYTE ESTERASE UR-ACNC: NEGATIVE — SIGNIFICANT CHANGE UP
LYMPHOCYTES # BLD AUTO: 0.28 K/UL — LOW (ref 1–3.3)
LYMPHOCYTES # BLD AUTO: 7.9 % — LOW (ref 13–44)
MAGNESIUM SERPL-MCNC: 2.4 MG/DL — SIGNIFICANT CHANGE UP (ref 1.6–2.6)
MANUAL SMEAR VERIFICATION: SIGNIFICANT CHANGE UP
MCHC RBC-ENTMCNC: 28.6 PG — SIGNIFICANT CHANGE UP (ref 27–34)
MCHC RBC-ENTMCNC: 33.8 GM/DL — SIGNIFICANT CHANGE UP (ref 32–36)
MCV RBC AUTO: 84.6 FL — SIGNIFICANT CHANGE UP (ref 80–100)
METAMYELOCYTES # FLD: 10.5 % — HIGH (ref 0–0)
METHOD TYPE: SIGNIFICANT CHANGE UP
MONOCYTES # BLD AUTO: 0.31 K/UL — SIGNIFICANT CHANGE UP (ref 0–0.9)
MONOCYTES NFR BLD AUTO: 8.8 % — SIGNIFICANT CHANGE UP (ref 2–14)
MYELOCYTES NFR BLD: 1.8 % — HIGH (ref 0–0)
NEUTROPHILS # BLD AUTO: 2.48 K/UL — SIGNIFICANT CHANGE UP (ref 1.8–7.4)
NEUTROPHILS NFR BLD AUTO: 64 % — SIGNIFICANT CHANGE UP (ref 43–77)
NEUTS BAND # BLD: 7 % — SIGNIFICANT CHANGE UP (ref 0–8)
NITRITE UR-MCNC: NEGATIVE — SIGNIFICANT CHANGE UP
NRBC # BLD: 1 /100 — HIGH (ref 0–0)
OSMOLALITY UR: 418 MOS/KG — SIGNIFICANT CHANGE UP (ref 300–900)
OTHER CELLS CSF MANUAL: 13.7 ML/DL — LOW (ref 18–22)
PCO2 BLDV: 30 MMHG — LOW (ref 39–42)
PH BLDV: 7.37 — SIGNIFICANT CHANGE UP (ref 7.32–7.43)
PH UR: 5.5 — SIGNIFICANT CHANGE UP (ref 5–8)
PHOSPHATE SERPL-MCNC: 7 MG/DL — HIGH (ref 2.5–4.5)
PLAT MORPH BLD: NORMAL — SIGNIFICANT CHANGE UP
PLATELET # BLD AUTO: 208 K/UL — SIGNIFICANT CHANGE UP (ref 150–400)
PO2 BLDV: 67 MMHG — HIGH (ref 25–45)
POTASSIUM BLDV-SCNC: 3 MMOL/L — LOW (ref 3.5–5.1)
POTASSIUM SERPL-MCNC: 3.1 MMOL/L — LOW (ref 3.5–5.3)
POTASSIUM SERPL-SCNC: 3.1 MMOL/L — LOW (ref 3.5–5.3)
POTASSIUM UR-SCNC: 33 MMOL/L — SIGNIFICANT CHANGE UP
PROT ?TM UR-MCNC: 48 MG/DL — HIGH (ref 0–12)
PROT SERPL-MCNC: 5.8 G/DL — LOW (ref 6–8.3)
PROT UR-MCNC: ABNORMAL
PROT/CREAT UR-RTO: 0.8 RATIO — HIGH (ref 0–0.2)
RBC # BLD: 3.71 M/UL — LOW (ref 3.8–5.2)
RBC # FLD: 14.6 % — HIGH (ref 10.3–14.5)
RBC BLD AUTO: SIGNIFICANT CHANGE UP
RBC CASTS # UR COMP ASSIST: 4 /HPF — SIGNIFICANT CHANGE UP (ref 0–4)
SAO2 % BLDV: 89.4 % — HIGH (ref 67–88)
SODIUM SERPL-SCNC: 134 MMOL/L — LOW (ref 135–145)
SODIUM UR-SCNC: 52 MMOL/L — SIGNIFICANT CHANGE UP
SP GR SPEC: 1.02 — SIGNIFICANT CHANGE UP (ref 1.01–1.02)
SPECIMEN SOURCE: SIGNIFICANT CHANGE UP
TSH SERPL-MCNC: 2.01 UIU/ML — SIGNIFICANT CHANGE UP (ref 0.27–4.2)
UROBILINOGEN FLD QL: NEGATIVE — SIGNIFICANT CHANGE UP
WBC # BLD: 3.49 K/UL — LOW (ref 3.8–10.5)
WBC # FLD AUTO: 3.49 K/UL — LOW (ref 3.8–10.5)
WBC UR QL: 1 /HPF — SIGNIFICANT CHANGE UP (ref 0–5)

## 2023-04-04 PROCEDURE — 99223 1ST HOSP IP/OBS HIGH 75: CPT

## 2023-04-04 PROCEDURE — 74018 RADEX ABDOMEN 1 VIEW: CPT | Mod: 26

## 2023-04-04 RX ORDER — NALOXONE HYDROCHLORIDE 4 MG/.1ML
0.4 SPRAY NASAL ONCE
Refills: 0 | Status: DISCONTINUED | OUTPATIENT
Start: 2023-04-04 | End: 2023-04-05

## 2023-04-04 RX ORDER — MORPHINE SULFATE 50 MG/1
2 CAPSULE, EXTENDED RELEASE ORAL EVERY 4 HOURS
Refills: 0 | Status: DISCONTINUED | OUTPATIENT
Start: 2023-04-04 | End: 2023-04-05

## 2023-04-04 RX ORDER — HEPARIN SODIUM 5000 [USP'U]/ML
5000 INJECTION INTRAVENOUS; SUBCUTANEOUS EVERY 12 HOURS
Refills: 0 | Status: DISCONTINUED | OUTPATIENT
Start: 2023-04-04 | End: 2023-04-06

## 2023-04-04 RX ORDER — MORPHINE SULFATE 50 MG/1
4 CAPSULE, EXTENDED RELEASE ORAL EVERY 4 HOURS
Refills: 0 | Status: DISCONTINUED | OUTPATIENT
Start: 2023-04-04 | End: 2023-04-05

## 2023-04-04 RX ORDER — FAMOTIDINE 10 MG/ML
20 INJECTION INTRAVENOUS
Refills: 0 | Status: DISCONTINUED | OUTPATIENT
Start: 2023-04-04 | End: 2023-04-05

## 2023-04-04 RX ORDER — ONDANSETRON 8 MG/1
4 TABLET, FILM COATED ORAL EVERY 8 HOURS
Refills: 0 | Status: DISCONTINUED | OUTPATIENT
Start: 2023-04-04 | End: 2023-04-10

## 2023-04-04 RX ORDER — DEXTROSE 50 % IN WATER 50 %
12.5 SYRINGE (ML) INTRAVENOUS ONCE
Refills: 0 | Status: DISCONTINUED | OUTPATIENT
Start: 2023-04-04 | End: 2023-04-04

## 2023-04-04 RX ORDER — SIMVASTATIN 20 MG/1
10 TABLET, FILM COATED ORAL AT BEDTIME
Refills: 0 | Status: DISCONTINUED | OUTPATIENT
Start: 2023-04-04 | End: 2023-04-05

## 2023-04-04 RX ORDER — CEFEPIME 1 G/1
1000 INJECTION, POWDER, FOR SOLUTION INTRAMUSCULAR; INTRAVENOUS DAILY
Refills: 0 | Status: DISCONTINUED | OUTPATIENT
Start: 2023-04-04 | End: 2023-04-04

## 2023-04-04 RX ORDER — FAMOTIDINE 10 MG/ML
40 INJECTION INTRAVENOUS AT BEDTIME
Refills: 0 | Status: DISCONTINUED | OUTPATIENT
Start: 2023-04-04 | End: 2023-04-04

## 2023-04-04 RX ORDER — SODIUM CHLORIDE 9 MG/ML
1000 INJECTION INTRAMUSCULAR; INTRAVENOUS; SUBCUTANEOUS
Refills: 0 | Status: COMPLETED | OUTPATIENT
Start: 2023-04-04 | End: 2023-04-04

## 2023-04-04 RX ORDER — PANTOPRAZOLE SODIUM 20 MG/1
40 TABLET, DELAYED RELEASE ORAL
Refills: 0 | Status: DISCONTINUED | OUTPATIENT
Start: 2023-04-04 | End: 2023-04-04

## 2023-04-04 RX ORDER — DEXTROSE 50 % IN WATER 50 %
25 SYRINGE (ML) INTRAVENOUS ONCE
Refills: 0 | Status: DISCONTINUED | OUTPATIENT
Start: 2023-04-04 | End: 2023-04-04

## 2023-04-04 RX ORDER — SODIUM CHLORIDE 9 MG/ML
1000 INJECTION, SOLUTION INTRAVENOUS
Refills: 0 | Status: DISCONTINUED | OUTPATIENT
Start: 2023-04-04 | End: 2023-04-04

## 2023-04-04 RX ORDER — PANTOPRAZOLE SODIUM 20 MG/1
40 TABLET, DELAYED RELEASE ORAL DAILY
Refills: 0 | Status: DISCONTINUED | OUTPATIENT
Start: 2023-04-04 | End: 2023-04-05

## 2023-04-04 RX ORDER — CEFTRIAXONE 500 MG/1
2000 INJECTION, POWDER, FOR SOLUTION INTRAMUSCULAR; INTRAVENOUS EVERY 24 HOURS
Refills: 0 | Status: DISCONTINUED | OUTPATIENT
Start: 2023-04-04 | End: 2023-04-05

## 2023-04-04 RX ORDER — POTASSIUM CHLORIDE 20 MEQ
10 PACKET (EA) ORAL
Refills: 0 | Status: COMPLETED | OUTPATIENT
Start: 2023-04-04 | End: 2023-04-04

## 2023-04-04 RX ORDER — GLUCAGON INJECTION, SOLUTION 0.5 MG/.1ML
1 INJECTION, SOLUTION SUBCUTANEOUS ONCE
Refills: 0 | Status: DISCONTINUED | OUTPATIENT
Start: 2023-04-04 | End: 2023-04-05

## 2023-04-04 RX ORDER — ACETAMINOPHEN 500 MG
650 TABLET ORAL EVERY 6 HOURS
Refills: 0 | Status: DISCONTINUED | OUTPATIENT
Start: 2023-04-04 | End: 2023-04-10

## 2023-04-04 RX ORDER — DEXTROSE 50 % IN WATER 50 %
15 SYRINGE (ML) INTRAVENOUS ONCE
Refills: 0 | Status: DISCONTINUED | OUTPATIENT
Start: 2023-04-04 | End: 2023-04-04

## 2023-04-04 RX ORDER — LANOLIN ALCOHOL/MO/W.PET/CERES
3 CREAM (GRAM) TOPICAL AT BEDTIME
Refills: 0 | Status: DISCONTINUED | OUTPATIENT
Start: 2023-04-04 | End: 2023-04-10

## 2023-04-04 RX ADMIN — Medication 100 MILLIEQUIVALENT(S): at 10:22

## 2023-04-04 RX ADMIN — MORPHINE SULFATE 2 MILLIGRAM(S): 50 CAPSULE, EXTENDED RELEASE ORAL at 10:22

## 2023-04-04 RX ADMIN — SODIUM CHLORIDE 125 MILLILITER(S): 9 INJECTION INTRAMUSCULAR; INTRAVENOUS; SUBCUTANEOUS at 16:57

## 2023-04-04 RX ADMIN — MORPHINE SULFATE 4 MILLIGRAM(S): 50 CAPSULE, EXTENDED RELEASE ORAL at 03:47

## 2023-04-04 RX ADMIN — Medication 100 MILLIEQUIVALENT(S): at 08:46

## 2023-04-04 RX ADMIN — MORPHINE SULFATE 4 MILLIGRAM(S): 50 CAPSULE, EXTENDED RELEASE ORAL at 04:02

## 2023-04-04 RX ADMIN — SIMVASTATIN 10 MILLIGRAM(S): 20 TABLET, FILM COATED ORAL at 22:28

## 2023-04-04 RX ADMIN — MORPHINE SULFATE 2 MILLIGRAM(S): 50 CAPSULE, EXTENDED RELEASE ORAL at 10:40

## 2023-04-04 RX ADMIN — PANTOPRAZOLE SODIUM 40 MILLIGRAM(S): 20 TABLET, DELAYED RELEASE ORAL at 11:49

## 2023-04-04 RX ADMIN — SODIUM CHLORIDE 125 MILLILITER(S): 9 INJECTION INTRAMUSCULAR; INTRAVENOUS; SUBCUTANEOUS at 02:09

## 2023-04-04 RX ADMIN — CEFTRIAXONE 100 MILLIGRAM(S): 500 INJECTION, POWDER, FOR SOLUTION INTRAMUSCULAR; INTRAVENOUS at 21:30

## 2023-04-04 RX ADMIN — FAMOTIDINE 20 MILLIGRAM(S): 10 INJECTION INTRAVENOUS at 21:30

## 2023-04-04 RX ADMIN — CEFEPIME 100 MILLIGRAM(S): 1 INJECTION, POWDER, FOR SOLUTION INTRAMUSCULAR; INTRAVENOUS at 11:50

## 2023-04-04 RX ADMIN — HEPARIN SODIUM 5000 UNIT(S): 5000 INJECTION INTRAVENOUS; SUBCUTANEOUS at 05:27

## 2023-04-04 RX ADMIN — HEPARIN SODIUM 5000 UNIT(S): 5000 INJECTION INTRAVENOUS; SUBCUTANEOUS at 16:58

## 2023-04-04 NOTE — PATIENT PROFILE ADULT - FALL HARM RISK - HARM RISK INTERVENTIONS

## 2023-04-04 NOTE — H&P ADULT - NSHPADDITIONALINFOADULT_GEN_ALL_CORE
full code  activity as tolerated  npo   vte ppx w full code  activity as tolerated  npo   vte ppx w subq hep

## 2023-04-04 NOTE — PHARMACOTHERAPY INTERVENTION NOTE - COMMENTS
LINDA GONZALEZ, 78y Female with E. coli bacteremia, source likely secondary to intestinal pseudoobstruction as seen on CT A/P. Patient was started on cefepime empirically.    Recommendation(s):  1) Discussed with PA, since blood cultures are growing only E. coli and no growth of Pseudomonas or detection of ESBL resistance, would consider narrowing cefepime to ceftriaxone.  2) Would consider ID consult for help with bacteremia management.    With kind regards,  Louie Lopez, PharmD, BCIDP  Infectious Diseases Clinical Pharmacist  Available on Microsoft Teams  .

## 2023-04-04 NOTE — H&P ADULT - NSHPPHYSICALEXAM_GEN_ALL_CORE
T(C): 37 (04-04-23 @ 00:00), Max: 37 (04-04-23 @ 00:00)  HR: 100 (04-04-23 @ 00:00) (86 - 100)  BP: 110/50 (04-04-23 @ 00:00) (105/67 - 140/60)  RR: 16 (04-04-23 @ 00:00) (16 - 18)  SpO2: 95% (04-04-23 @ 00:00) (91% - 97%)  GENERAL: NAD, lying in bed comfortably  EYES: EOMI, PERRLA; conjunctiva and sclera clear  ENMT: dry oral mucosa, no pharyngeal injection or exudates  NECK: Supple, no palpable masses; no JVD  RESPIRATORY: Normal respiratory effort; lungs are clear to auscultation bilaterally  CARDIOVASCULAR: Regular rate and rhythm, normal S1 and S2, no murmur/rub/gallop; No lower extremity edema; Peripheral pulses are 2+ bilaterally  ABDOMEN: Nontender to palpation, normoactive bowel sounds, no rebound/guarding  MUSCULOSKELETAL:  Normal gait; no clubbing or cyanosis of digits; no joint swelling or tenderness to palpation  PSYCH: A+O to person, place, and time; affect appropriate  NEUROLOGY: CN 2-12 are intact and symmetric; no gross motor or sensory deficits   SKIN: No rashes; no palpable lesions T(C): 37 (04-04-23 @ 00:00), Max: 37 (04-04-23 @ 00:00)  HR: 100 (04-04-23 @ 00:00) (86 - 100)  BP: 110/50 (04-04-23 @ 00:00) (105/67 - 140/60)  RR: 16 (04-04-23 @ 00:00) (16 - 18)  SpO2: 95% (04-04-23 @ 00:00) (91% - 97%)  GENERAL: NAD, lying in bed comfortably  EYES: EOMI, PERRLA; conjunctiva and sclera clear  ENMT: dry oral mucosa, no pharyngeal injection or exudates, NGT in place  NECK: Supple, no palpable masses; no JVD  RESPIRATORY: Normal respiratory effort; lungs are clear to auscultation bilaterally  CARDIOVASCULAR: Regular rate and rhythm, normal S1 and S2, no murmur/rub/gallop; No lower extremity edema; Peripheral pulses are 2+ bilaterally  ABDOMEN: TTP, difficult to appreciate bowel sounds, no rebound/guarding, rectal tube in place  MUSCULOSKELETAL: no joint swelling or tenderness to palpation  PSYCH: A+O to person, place, and time; affect appropriate  NEUROLOGY: CN 2-12 are intact and symmetric; no gross motor or sensory deficits   SKIN: No rashes; no palpable lesions

## 2023-04-04 NOTE — H&P ADULT - PROBLEM SELECTOR PROBLEM 3
Unless prior polyps, bowel symptoms or family history concern, we can defer until age 49   GERD (gastroesophageal reflux disease)

## 2023-04-04 NOTE — PROGRESS NOTE ADULT - SUBJECTIVE AND OBJECTIVE BOX
[facility] Daily Progress Note    SUBJECTIVE:  *Incomplete*    OBJECTIVE:  Vital Signs Last 24 Hrs  T(C): 37.3 (04 Apr 2023 05:13), Max: 37.3 (04 Apr 2023 05:13)  T(F): 99.2 (04 Apr 2023 05:13), Max: 99.2 (04 Apr 2023 05:13)  HR: 100 (04 Apr 2023 05:13) (86 - 106)  BP: 113/66 (04 Apr 2023 05:13) (100/51 - 140/60)  BP(mean): 68 (04 Apr 2023 00:00) (68 - 68)  RR: 18 (04 Apr 2023 05:13) (16 - 18)  SpO2: 96% (04 Apr 2023 05:13) (91% - 97%)    Parameters below as of 04 Apr 2023 05:13  Patient On (Oxygen Delivery Method): room air        I&O's Detail    Exam:  General: resting in stretcher, NAD  Resp: nonlabored breathing  Abd: midline laparotomy scar; soft, very distended, mild LLQ>RLQ TTP; no rebound or guarding  Ext: WWP  Neuro: AAO x 3                        10.6   3.49  )-----------( 208      ( 04 Apr 2023 07:07 )             31.4       04-03    132<L>  |  92<L>  |  56<H>  ----------------------------<  178<H>  3.9   |  17<L>  |  1.82<H>    Ca    7.0<L>      03 Apr 2023 13:57  Mg     2.4     04-03    TPro  6.2  /  Alb  3.5  /  TBili  1.7<H>  /  DBili  x   /  AST  14  /  ALT  13  /  AlkPhos  66  04-03             [facility] Daily Progress Note    SUBJECTIVE:  Pt seen and examined at bedside, feeling less distended than yesterday, having minimal pain. Having BMs via rectal tube and feeling improved.     OBJECTIVE:  Vital Signs Last 24 Hrs  T(C): 37.3 (04 Apr 2023 05:13), Max: 37.3 (04 Apr 2023 05:13)  T(F): 99.2 (04 Apr 2023 05:13), Max: 99.2 (04 Apr 2023 05:13)  HR: 100 (04 Apr 2023 05:13) (86 - 106)  BP: 113/66 (04 Apr 2023 05:13) (100/51 - 140/60)  BP(mean): 68 (04 Apr 2023 00:00) (68 - 68)  RR: 18 (04 Apr 2023 05:13) (16 - 18)  SpO2: 96% (04 Apr 2023 05:13) (91% - 97%)    Parameters below as of 04 Apr 2023 05:13  Patient On (Oxygen Delivery Method): room air        I&O's Detail    Exam:  General: resting in stretcher, NAD  Resp: nonlabored breathing  Abd: midline laparotomy scar; soft, very distended, mild LLQ>RLQ TTP; no rebound or guarding  Ext: WWP  Neuro: AAO x 3                        10.6   3.49  )-----------( 208      ( 04 Apr 2023 07:07 )             31.4       04-03    132<L>  |  92<L>  |  56<H>  ----------------------------<  178<H>  3.9   |  17<L>  |  1.82<H>    Ca    7.0<L>      03 Apr 2023 13:57  Mg     2.4     04-03    TPro  6.2  /  Alb  3.5  /  TBili  1.7<H>  /  DBili  x   /  AST  14  /  ALT  13  /  AlkPhos  66  04-03

## 2023-04-04 NOTE — H&P ADULT - NSICDXPASTMEDICALHX_GEN_ALL_CORE_FT
PAST MEDICAL HISTORY:  Breast cancer     Chronic idiopathic pseudo-obstruction of intestine     GERD (gastroesophageal reflux disease)     HLD (hyperlipidemia)     Osteoporosis

## 2023-04-04 NOTE — CHART NOTE - NSCHARTNOTEFT_GEN_A_CORE
Patient seen and examined in [place] section of the ED at approximately 11:40 this morning with daughter at bedside.    History, labs, chart reviewed.    On assessment, patient reports feeling better than when she first arrived to the ED. Her NG tube has been removed and reports no nausea/vomiting. Continues to have stool output via rectal tube. Notes her abdomen is moderately distended, a little more than baseline, but LLQ discomfort has lessened. Has been noted to be retaining urine as well.    On exam, vital signs are stable. She is afebrile. Abdomen is moderately distended but soft. No fluid wave. Minimal tenderness to palpation over LLQ and suprapubic area. Bowel sounds are slowed but present. Rectal tube with soft brown stool noted.    A/P:  1. Intestinal pseudoobstruction  -Trial of clear liquid diet today  -GI f/u (case d/w Dr. Zepeda earlier today)  -Surgery f/u  -Serial abdominal exams  -Maintain rectal tube for now    2. Gram negative doc bacteremia. Suspect gut translocation as source vs. urinary source in setting of retention. Uncomplicated at this time.  -F/U BCx  -Continue empiric cefepime, dosed renally for CrCl < 30 at this time. Will adjust pending repeat BMP. She reports PCN allergy but has tolerated cefepime earlier in this hospitalization. Ultimately plan for seven day course.  -Monitor fever curve, CBC with differential (leukopenia)  -If fever recurs or symptoms worsen in next 24-48, would repeat BCx and consider ID evaluation.    3. Leukopenia with left shift: band neutrophils, metamyelocytes present. ? secondary to sepsis vs. other primary hematologic condition  -Trend CBC with differential  -If shift persists despite adequate treatment of sepsis, would obtain hematology evaluation    4. BLAKE, likely prerenal in nature vs. ATN in setting of infection. Also with hypokalemia, metabolic acidosis.  -Creatinine in October 2022 was 0.79.  -Avoid nephrotoxic medications  -Continue IV hydration  -Renal dosing of medications  -Catheterization for urinary retention

## 2023-04-04 NOTE — H&P ADULT - ASSESSMENT
79 yo f w pmh hld, gerd, osteoporosis, sbo s/p exlap with ?enteropexy?, cipo, breast ca s/p lumpectomy + rt, p/w abdominal pain, found to have dilated small and large bowel iso cipo + focal wall thickening in right colon r/o mass lesion, admitted to medicine for further mgmt.

## 2023-04-04 NOTE — ED ADULT NURSE REASSESSMENT NOTE - NS ED NURSE REASSESS COMMENT FT1
pt resting in stretcher  rectal tube and NG tube in place draining well, WDL.  pt no acute distress, denies complaints at this time.

## 2023-04-04 NOTE — PROGRESS NOTE ADULT - ASSESSMENT
78F hx of intestinal dysmotility with chronic pseudoobstruction, SBO s/p exlap with ?intestinal pexy, hysterectomy, presents with abdominal pain x 5d, found to have chronic intestinal pseudoobsturction on CT. Patient hemodynamically stable, nonperitoneal on exam, with normal WBC, elevated lactated, likely 2/2 dehydration    Recommendation:  - No acute surgical intervention  - NPO, IVF resuscitation  - trend lactate  - NGT   - Rectal decompression with rectal tube, balloon with 45 cc's  - Serial abdominal exams  - GI consult (Dr. Zepeda) f/u    [facility]  [id number] 78F hx of intestinal dysmotility with chronic pseudoobstruction, SBO s/p exlap with ?intestinal pexy, hysterectomy, presents with abdominal pain x 5d, found to have chronic intestinal pseudoobsturction on CT. Patient hemodynamically stable, nonperitoneal on exam, with normal WBC, elevated lactated, likely 2/2 dehydration    Recommendation:  - No acute surgical intervention  - NPO, IVF resuscitation  - trend lactate  - NGT   - Rectal decompression with rectal tube, balloon with 45 cc's  - Serial abdominal exams  - GI consult (Dr. Zepeda) f/u    [facility]  [id number]

## 2023-04-04 NOTE — H&P ADULT - PROBLEM SELECTOR PLAN 2
hypoNa, hypoCl, HAGMA, hyperCa, judd, lactic acidosis; all consistent with dehydration + hypovolemia  s/p 2 L LR with improvement of na, cl, ca, judd, hagma, lactic acidosis  Monitor I/Os, daily weights, UO, BUN/Cr, volume status, acid-base balance, electrolytes   follow up urine studies + calculate FENa/FEUrea  trend na q4-6h until wnl; goal rate of correction ~8 meq/l/day  trend lactate q4-6h until wnl  avoid nephrotoxic agents; appropriate dose adjustments for all renally cleared medications   IVF resusci + electrolyte supplementation

## 2023-04-04 NOTE — CHART NOTE - NSCHARTNOTEFT_GEN_A_CORE
Pharmacy reached out to provider regarding changing antibiotic regimen from cefepime to ceftriaxone as there is no pseudomonal growth and no ESBL resistance detected based on blood culture PCR result. Pharmacy also recommended adding metronidazole 500 mg IV q12h and an ID consult given the concern for obstruction. Provider relayed information to Dr. Solorzano. Per Dr. Solorzano, change cefepime to ceftriaxone 2g IV q24h for bacteremia dosing. Will continue to re-evaluate.       Jodi LEONG  Dept of Medicine x2

## 2023-04-04 NOTE — CONSULT NOTE ADULT - SUBJECTIVE AND OBJECTIVE BOX
improving w conservative mgmt  d/c ngt  clear liquid diet Chief Complaint:  Patient is a 78y old  Female who presents with a chief complaint of abdominal pain (2023 12:44)      Date of service: 23 @ 20:07    HPI:    The patient is a 78 year old female with pmhx of chronic intestinal pseudoobstruction who presents with abdominal pain. She reports several days of lack of bowel movement, leading to abdominal pain and worsening distention. She has chronic distention and takes reglan as prescribed by her GI Dr. Gordon.    Overnight, the patient had several bowel movements and feels much improved.    Allergies:  penicillins (Unknown)  sulfa drugs (Hives)      Home Medications:    Hospital Medications:  acetaminophen     Tablet .. 650 milliGRAM(s) Oral every 6 hours PRN  aluminum hydroxide/magnesium hydroxide/simethicone Suspension 30 milliLiter(s) Oral every 4 hours PRN  cefTRIAXone   IVPB 2000 milliGRAM(s) IV Intermittent every 24 hours  famotidine    Tablet 20 milliGRAM(s) Oral <User Schedule>  glucagon  Injectable 1 milliGRAM(s) IntraMuscular once  heparin   Injectable 5000 Unit(s) SubCutaneous every 12 hours  melatonin 3 milliGRAM(s) Oral at bedtime PRN  morphine  - Injectable 2 milliGRAM(s) IV Push every 4 hours PRN  morphine  - Injectable 4 milliGRAM(s) IV Push every 4 hours PRN  naloxone Injectable 0.4 milliGRAM(s) IV Push once  ondansetron Injectable 4 milliGRAM(s) IV Push every 8 hours PRN  pantoprazole   Suspension 40 milliGRAM(s) Oral daily  simvastatin 10 milliGRAM(s) Oral at bedtime      PMHX/PSHX:  Chronic idiopathic pseudo-obstruction of intestine    Breast cancer    HLD (hyperlipidemia)    GERD (gastroesophageal reflux disease)    Osteoporosis    S/P small bowel resection        Family history:      Social History:   Denies ethanol use.  Denies illicit drug use.    ROS:     General:  No wt loss, fevers, chills, night sweats, fatigue,   Eyes:  Good vision, no reported pain  ENT:  No sore throat, pain, runny nose, dysphagia  CV:  No pain, palpitations, hypo/hypertension  Resp:  No dyspnea, cough, tachypnea, wheezing  GI:  See HPI  :  No pain, bleeding, incontinence, nocturia  Muscle:  No pain, weakness  Neuro:  No weakness, tingling, memory problems  Psych:  No fatigue, insomnia, mood problems, depression  Endocrine:  No polyuria, polydipsia, cold/heat intolerance  Heme:  No petechiae, ecchymosis, easy bruisability  Integumentary:  No rash, edema      PHYSICAL EXAM:     GENERAL:  Appears stated age, well-groomed, well-nourished, no distress  HEENT:  NC/AT,  conjunctivae anicteric, clear and pink,   NECK: supple, trachea midline  CHEST:  Full & symmetric excursion, no increased effort, breath sounds clear  HEART:  Regular rhythm, no JVD  ABDOMEN:  Softly distended, LLQ TTP normoactive bowel sounds,  no masses , no hepatosplenomegaly  EXTREMITIES:  no cyanosis,clubbing or edema  SKIN:  No rash, erythema, or, ecchymoses, no jaundice  NEURO:  Alert, non-focal, no asterixis  PSYCH: Appropriate affect, oriented to place and time  RECTAL: Deferred      Vital Signs:  Vital Signs Last 24 Hrs  T(C): 36.8 (2023 16:13), Max: 37.3 (2023 05:13)  T(F): 98.3 (2023 16:13), Max: 99.2 (2023 05:13)  HR: 96 (2023 16:13) (86 - 106)  BP: 104/63 (2023 16:13) (100/51 - 122/48)  BP(mean): 68 (2023 00:00) (68 - 68)  RR: 18 (2023 16:13) (16 - 18)  SpO2: 94% (2023 16:13) (91% - 96%)    Parameters below as of 2023 16:13  Patient On (Oxygen Delivery Method): nasal cannula  O2 Flow (L/min): 1    Daily     Daily     LABS: Labs personally reviewed by me:                        10.6   3.49  )-----------( 208      ( 2023 07:07 )             31.4     04-04    134<L>  |  98  |  66<H>  ----------------------------<  121<H>  3.1<L>   |  17<L>  |  2.06<H>    Ca    6.6<L>      2023 07:07  Phos  7.0     04-04  Mg     2.4     04-04    TPro  5.8<L>  /  Alb  3.1<L>  /  TBili  1.5<H>  /  DBili  x   /  AST  12  /  ALT  10  /  AlkPhos  54  04-04    LIVER FUNCTIONS - ( 2023 07:07 )  Alb: 3.1 g/dL / Pro: 5.8 g/dL / ALK PHOS: 54 U/L / ALT: 10 U/L / AST: 12 U/L / GGT: x             Urinalysis Basic - ( 2023 13:04 )    Color: Yellow / Appearance: Clear / S.017 / pH: x  Gluc: x / Ketone: Negative  / Bili: Negative / Urobili: Negative   Blood: x / Protein: 30 mg/dL / Nitrite: Negative   Leuk Esterase: Negative / RBC: 4 /hpf / WBC 1 /HPF   Sq Epi: x / Non Sq Epi: 5 /hpf / Bacteria: Occasional          Imaging personally reviewed by me:

## 2023-04-04 NOTE — PATIENT PROFILE ADULT - ARE SIGNIFICANT INDICATORS COMPLETE.
Patient Instructions by Jose C Miller MD at 04/07/17 08:43 AM     Author:  Jose C Miller MD Service:  (none) Author Type:  Physician     Filed:  04/07/17 08:44 AM Encounter Date:  4/7/2017 Status:  Signed     :  Jose C Miller MD (Physician)            I agree with return to work if doing well and Dr. Haile approves    Fasting labs next month    appt next month      Additional Educational Resources:  For additional resources regarding your symptoms, diagnosis, or further health information, please visit the Health Resources section on [email or web address] or the Online Health Resources section in [email or web address].            Revision History        User Key Date/Time User Provider Type Action    > [N/A] 04/07/17 08:44 AM Jose C Miller MD Physician Sign            
Yes

## 2023-04-04 NOTE — H&P ADULT - PROBLEM SELECTOR PLAN 3
cont home protonix 40 daily + famotidine 40 hs (renal dose adjustment to 20 q48h)  hold home misoprostol

## 2023-04-04 NOTE — H&P ADULT - HISTORY OF PRESENT ILLNESS
77 yo f w pmh hld, gerd, osteoporosis, sbo s/p exlap with ?enteropexy?, cipo, breast ca s/p lumpectomy + rt, p/w abdominal pain, found to have dilated small and large bowel iso cipo + focal wall thickening in right colon r/o mass lesion, admitted to medicine for further mgmt.   77 yo f w pmh hld, gerd, osteoporosis, sbo s/p exlap with ?enteropexy?, cipo, breast ca s/p lumpectomy + rt, p/w abdominal pain for the last few days, mainly over lower abdomen, a/w nausea and constipation. Patient has known h/o of intestinal dysmotility, and takes reglan + miralax at home for constipation. Normally has 2-3 BM daily, but has not had BM for the last few days. patient and family grew concerned so present to [facility] er for further evaluation.      in er, found to have dilated small and large bowel iso cipo + focal wall thickening in right colon r/o mass lesion, admitted to medicine for further mgmt.

## 2023-04-04 NOTE — H&P ADULT - NSHPREVIEWOFSYSTEMS_GEN_ALL_CORE
CONSTITUTIONAL: No fever. no weakness  ENMT:  No sinus or throat pain  RESPIRATORY: No cough, wheezing, chills or hemoptysis; No shortness of breath  CARDIOVASCULAR: No chest pain, palpitations, dizziness, or leg swelling  GASTROINTESTINAL: + abdominal discomfort, + nausea, no vomiting, or hematemesis; No diarrhea, + constipation. No melena or hematochezia.  GENITOURINARY: No dysuria or incontinence  NEUROLOGICAL: No headaches, memory loss, loss of strength, numbness, or tremors  SKIN: No rashes,  No hives or eczema  ENDOCRINE: No heat or cold intolerance; No hair loss  MUSCULOSKELETAL: No joint pain or swelling; No muscle, back, or extremity pain  PSYCHIATRIC: No depression, anxiety, mood swings, or difficulty sleeping  HEME/LYMPH: No easy bruising, or bleeding gums; no enlarged LN

## 2023-04-04 NOTE — PROGRESS NOTE ADULT - ATTENDING COMMENTS
Reports feeling back to her baseline regarding her bloating.  + flatus and BMs.  Minimal lower abd discomfort.  Abdomen soft, NT, distended.  D/w GI, agree it is reasonable to remove the NGT (minimal output).  Pt with PSO and generalized GI dysmotility.  No indication for a surgical intervention.

## 2023-04-04 NOTE — CONSULT NOTE ADULT - ASSESSMENT
78 F w acute on chronic small and large bowel ileus, complicated by GNR bacteremia    1. 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 Now improved. Lactate decreased  -can remove NGT and advance to cld, can consider neostigmine if does not continue to improve    2. GNR bacteremia. ?translocation   -cefepime/flagyl per ID    3. mild bilirubinemia      Advanced care planning forms were discussed. Code status including forceful chest compressions, defibrillation and intubation were discussed. The risks benefits and alternatives to pertinent gastrointestinal procedures and interventions were discussed in detail and all questions were answered. Duration: 15 Minutes.    [facility]  Patel Zepeda M.D.   [address], [place]  Office: [phone]

## 2023-04-04 NOTE — H&P ADULT - PROBLEM SELECTOR PLAN 1
h/o sbo s/p exlap with ?enteropexy?, cipo, constipation  no leukocytosis, afebrile, hds  imaging shows, "Marked dilatation of small and large bowel to the level of the rectum without evidence of an obstructing mass. Degree of dilatation as described above suggests chronic intestinal pseudoobstruction...Focal wall thickening in the right colon for which colonoscopy is recommended to exclude a mass lesion."  xgs consulted in er; "NGT placement....Rectal decompression with malecot drain placement...GI consult (Dr. Zepeda)"  s/p ngt + rectal tube placement in er  follow up abdominal film in am  serial abdominal exams, serial abdominal imaging as needed  npo/bowel rest for now; ivf + lytes as needed in the mean time  supportive care with pain control, antiemetics  hold home reglan + miralax for now  xgs follow up in am  gi consult in am

## 2023-04-04 NOTE — CHART NOTE - NSCHARTNOTEFT_GEN_A_CORE
Patient seen and examined. Denies abdominal pain. Rectal tube with stool    Gen: resting in bed, NAD, NGT with gastric content  Resp: nonlabored breathing  Abd: +midline laparotomy scar; abdomen soft, distended, mild LLQ>RLQ TTP; no r/g  Ext: WWP  Neuro: AAOx3    Recommendation:  - continue with NGT and rectal tube decompression  - serial abdominal exam  - trend lactate  - NPO, IVF resuscitation  - GI consulted    [id number]

## 2023-04-05 DIAGNOSIS — N17.9 ACUTE KIDNEY FAILURE, UNSPECIFIED: ICD-10-CM

## 2023-04-05 DIAGNOSIS — R33.9 RETENTION OF URINE, UNSPECIFIED: ICD-10-CM

## 2023-04-05 DIAGNOSIS — Z29.9 ENCOUNTER FOR PROPHYLACTIC MEASURES, UNSPECIFIED: ICD-10-CM

## 2023-04-05 DIAGNOSIS — D72.819 DECREASED WHITE BLOOD CELL COUNT, UNSPECIFIED: ICD-10-CM

## 2023-04-05 DIAGNOSIS — R78.81 BACTEREMIA: ICD-10-CM

## 2023-04-05 DIAGNOSIS — K59.81 OGILVIE SYNDROME: ICD-10-CM

## 2023-04-05 LAB
-  AMIKACIN: SIGNIFICANT CHANGE UP
-  AMPICILLIN/SULBACTAM: SIGNIFICANT CHANGE UP
-  AMPICILLIN: SIGNIFICANT CHANGE UP
-  AZTREONAM: SIGNIFICANT CHANGE UP
-  CEFAZOLIN: SIGNIFICANT CHANGE UP
-  CEFEPIME: SIGNIFICANT CHANGE UP
-  CEFOXITIN: SIGNIFICANT CHANGE UP
-  CEFTRIAXONE: SIGNIFICANT CHANGE UP
-  CIPROFLOXACIN: SIGNIFICANT CHANGE UP
-  ERTAPENEM: SIGNIFICANT CHANGE UP
-  GENTAMICIN: SIGNIFICANT CHANGE UP
-  LEVOFLOXACIN: SIGNIFICANT CHANGE UP
-  MEROPENEM: SIGNIFICANT CHANGE UP
-  PIPERACILLIN/TAZOBACTAM: SIGNIFICANT CHANGE UP
-  TOBRAMYCIN: SIGNIFICANT CHANGE UP
-  TRIMETHOPRIM/SULFAMETHOXAZOLE: SIGNIFICANT CHANGE UP
A1C WITH ESTIMATED AVERAGE GLUCOSE RESULT: 5.3 % — SIGNIFICANT CHANGE UP (ref 4–5.6)
ANION GAP SERPL CALC-SCNC: 16 MMOL/L — SIGNIFICANT CHANGE UP (ref 5–17)
BASOPHILS # BLD AUTO: 0.05 K/UL — SIGNIFICANT CHANGE UP (ref 0–0.2)
BASOPHILS NFR BLD AUTO: 0.9 % — SIGNIFICANT CHANGE UP (ref 0–2)
BUN SERPL-MCNC: 49 MG/DL — HIGH (ref 7–23)
CALCIUM SERPL-MCNC: 7.3 MG/DL — LOW (ref 8.4–10.5)
CHLORIDE SERPL-SCNC: 103 MMOL/L — SIGNIFICANT CHANGE UP (ref 96–108)
CO2 SERPL-SCNC: 15 MMOL/L — LOW (ref 22–31)
CREAT SERPL-MCNC: 1.53 MG/DL — HIGH (ref 0.5–1.3)
CULTURE RESULTS: SIGNIFICANT CHANGE UP
CULTURE RESULTS: SIGNIFICANT CHANGE UP
EGFR: 34 ML/MIN/1.73M2 — LOW
EOSINOPHIL # BLD AUTO: 0 K/UL — SIGNIFICANT CHANGE UP (ref 0–0.5)
EOSINOPHIL NFR BLD AUTO: 0 % — SIGNIFICANT CHANGE UP (ref 0–6)
ESTIMATED AVERAGE GLUCOSE: 105 MG/DL — SIGNIFICANT CHANGE UP (ref 68–114)
GIANT PLATELETS BLD QL SMEAR: PRESENT — SIGNIFICANT CHANGE UP
GLUCOSE SERPL-MCNC: 89 MG/DL — SIGNIFICANT CHANGE UP (ref 70–99)
HCT VFR BLD CALC: 27.7 % — LOW (ref 34.5–45)
HGB BLD-MCNC: 9.2 G/DL — LOW (ref 11.5–15.5)
LACTATE SERPL-SCNC: 0.9 MMOL/L — SIGNIFICANT CHANGE UP (ref 0.5–2)
LYMPHOCYTES # BLD AUTO: 0.39 K/UL — LOW (ref 1–3.3)
LYMPHOCYTES # BLD AUTO: 7 % — LOW (ref 13–44)
MANUAL SMEAR VERIFICATION: SIGNIFICANT CHANGE UP
MCHC RBC-ENTMCNC: 28.2 PG — SIGNIFICANT CHANGE UP (ref 27–34)
MCHC RBC-ENTMCNC: 33.2 GM/DL — SIGNIFICANT CHANGE UP (ref 32–36)
MCV RBC AUTO: 85 FL — SIGNIFICANT CHANGE UP (ref 80–100)
METAMYELOCYTES # FLD: 0.9 % — HIGH (ref 0–0)
METHOD TYPE: SIGNIFICANT CHANGE UP
MONOCYTES # BLD AUTO: 0.74 K/UL — SIGNIFICANT CHANGE UP (ref 0–0.9)
MONOCYTES NFR BLD AUTO: 13.1 % — SIGNIFICANT CHANGE UP (ref 2–14)
NEUTROPHILS # BLD AUTO: 4.4 K/UL — SIGNIFICANT CHANGE UP (ref 1.8–7.4)
NEUTROPHILS NFR BLD AUTO: 69.3 % — SIGNIFICANT CHANGE UP (ref 43–77)
NEUTS BAND # BLD: 8.8 % — HIGH (ref 0–8)
ORGANISM # SPEC MICROSCOPIC CNT: SIGNIFICANT CHANGE UP
PLAT MORPH BLD: NORMAL — SIGNIFICANT CHANGE UP
PLATELET # BLD AUTO: 209 K/UL — SIGNIFICANT CHANGE UP (ref 150–400)
POTASSIUM SERPL-MCNC: 3 MMOL/L — LOW (ref 3.5–5.3)
POTASSIUM SERPL-SCNC: 3 MMOL/L — LOW (ref 3.5–5.3)
RBC # BLD: 3.26 M/UL — LOW (ref 3.8–5.2)
RBC # FLD: 14.6 % — HIGH (ref 10.3–14.5)
RBC BLD AUTO: SIGNIFICANT CHANGE UP
SODIUM SERPL-SCNC: 134 MMOL/L — LOW (ref 135–145)
SPECIMEN SOURCE: SIGNIFICANT CHANGE UP
SPECIMEN SOURCE: SIGNIFICANT CHANGE UP
TOXIC GRANULES BLD QL SMEAR: PRESENT — SIGNIFICANT CHANGE UP
UUN UR-MCNC: 593 MG/DL — SIGNIFICANT CHANGE UP
WBC # BLD: 5.63 K/UL — SIGNIFICANT CHANGE UP (ref 3.8–10.5)
WBC # FLD AUTO: 5.63 K/UL — SIGNIFICANT CHANGE UP (ref 3.8–10.5)

## 2023-04-05 PROCEDURE — 99232 SBSQ HOSP IP/OBS MODERATE 35: CPT

## 2023-04-05 PROCEDURE — 99223 1ST HOSP IP/OBS HIGH 75: CPT | Mod: GC

## 2023-04-05 RX ORDER — CEFAZOLIN SODIUM 1 G
1000 VIAL (EA) INJECTION EVERY 8 HOURS
Refills: 0 | Status: DISCONTINUED | OUTPATIENT
Start: 2023-04-05 | End: 2023-04-10

## 2023-04-05 RX ORDER — SIMVASTATIN 20 MG/1
10 TABLET, FILM COATED ORAL AT BEDTIME
Refills: 0 | Status: DISCONTINUED | OUTPATIENT
Start: 2023-04-05 | End: 2023-04-10

## 2023-04-05 RX ORDER — POTASSIUM CHLORIDE 20 MEQ
40 PACKET (EA) ORAL EVERY 4 HOURS
Refills: 0 | Status: COMPLETED | OUTPATIENT
Start: 2023-04-05 | End: 2023-04-05

## 2023-04-05 RX ORDER — FAMOTIDINE 10 MG/ML
20 INJECTION INTRAVENOUS
Refills: 0 | Status: DISCONTINUED | OUTPATIENT
Start: 2023-04-05 | End: 2023-04-10

## 2023-04-05 RX ORDER — PANTOPRAZOLE SODIUM 20 MG/1
40 TABLET, DELAYED RELEASE ORAL DAILY
Refills: 0 | Status: DISCONTINUED | OUTPATIENT
Start: 2023-04-05 | End: 2023-04-05

## 2023-04-05 RX ORDER — PANTOPRAZOLE SODIUM 20 MG/1
40 TABLET, DELAYED RELEASE ORAL
Refills: 0 | Status: DISCONTINUED | OUTPATIENT
Start: 2023-04-05 | End: 2023-04-10

## 2023-04-05 RX ADMIN — SIMVASTATIN 10 MILLIGRAM(S): 20 TABLET, FILM COATED ORAL at 22:29

## 2023-04-05 RX ADMIN — Medication 100 MILLIGRAM(S): at 22:29

## 2023-04-05 RX ADMIN — Medication 40 MILLIEQUIVALENT(S): at 15:27

## 2023-04-05 RX ADMIN — FAMOTIDINE 20 MILLIGRAM(S): 10 INJECTION INTRAVENOUS at 22:29

## 2023-04-05 RX ADMIN — HEPARIN SODIUM 5000 UNIT(S): 5000 INJECTION INTRAVENOUS; SUBCUTANEOUS at 05:21

## 2023-04-05 RX ADMIN — HEPARIN SODIUM 5000 UNIT(S): 5000 INJECTION INTRAVENOUS; SUBCUTANEOUS at 17:58

## 2023-04-05 RX ADMIN — Medication 40 MILLIEQUIVALENT(S): at 10:51

## 2023-04-05 NOTE — CONSULT NOTE ADULT - ASSESSMENT
78 year old Female with PMHx of HLP, GERD, osteoporosis, SBP s/p exlap, breast Ca s/p lumpectomy + rt, p/w abdominal pain for the last few days, mainly over lower abdomen, with nausea and constipation.    Afebrile  No leukocytosis   Bandemia of 8.8%    Workup:  Blood Cx on 4/3: E coli ( 4/4 bottles)  UA: WBC 1, occasional bacteria   Urine Cx on 4/3: < 10K Cfu/ml NF  CXR: clear lungs   CT AP w/ Oral cont: Marked dilatation of small and large bowel to the level of the rectum without evidence of an obstructing mass, represents chronic intestinal pseudoobstruction. Focal wall thickening in the right colon exclude a mass lesion.  Xray abdomen: Enteric tube side port is within the esophagus. Less dilatation of small bowel loops.  Surgery on board - no acute surgical intervention     Antimicrobials:   Cefepime on 4/3  Ceftriaxone 4/4-->    #Small and large bowel dilation, Intestinal pseudoobstruction  #High grade E coli bacteremia likely source GI translocation   #Bandemia   #BLAKE     Recommendations:       PT TO BE SEEN. PRELIM NOTE  PENDING RECS. PLEASE WAIT FOR FINAL RECS AFTER DISCUSSION WITH ATTENDING#       78 year old Female with PMHx of HLP, GERD, osteoporosis, SBP s/p exlap, breast Ca s/p lumpectomy + rt, p/w abdominal pain for the last few days, mainly over lower abdomen, with nausea and constipation.    Afebrile  No leukocytosis   Bandemia of 8.8%    Workup:  Blood Cx on 4/3: E coli ( 4/4 bottles)  UA: WBC 1, occasional bacteria   Urine Cx on 4/3: < 10K Cfu/ml NF  CXR: clear lungs   CT AP w/ Oral cont: Marked dilatation of small and large bowel to the level of the rectum without evidence of an obstructing mass, represents chronic intestinal pseudoobstruction. Focal wall thickening in the right colon exclude a mass lesion.  Xray abdomen: Enteric tube side port is within the esophagus. Less dilatation of small bowel loops.  Surgery on board - no acute surgical intervention     Antimicrobials:   Cefepime on 4/3  Ceftriaxone 4/4-->    #Small and large bowel dilation, Intestinal pseudoobstruction  #High grade E coli bacteremia likely source GI translocation   #Bandemia   #BLAKE     Recommendations:   -Discontinue Ceftriaxone   -start Cefazolin 1 g IV q 8hrs ( patient wit Penicillin allergy, when she was a child, does not remember what reaction she got, stated had Keflex in the past with stomach discomfort but no other allergic reaction, tolerated Ceftriaxone well)  -GI and general surgery following   -Serial abdominal exam   -Diet as per GI     Seen and discussed with Dr Enzo Albrecht MD, PGY4  ID fellow  [employer] Teams Preferred  After 5pm/weekends call [phone]

## 2023-04-05 NOTE — CONSULT NOTE ADULT - SUBJECTIVE AND OBJECTIVE BOX
HPI:    78 year old Female with PMHx of HLP, GERD, osteoporosis, SBP s/p exlap, breast Ca s/p lumpectomy + rt, p/w abdominal pain for the last few days, mainly over lower abdomen, with nausea and constipation. Patient has known h/o of intestinal dysmotility, and takes reglan + miralax at home for constipation. Normally has 2-3 BM daily, but has not had BM for the last few days. In ER, found to have dilated small and large bowel iso cipo + focal wall thickening in right colon r/o mass lesion.  Blood Cultures came back positive for E coli.    ID consulted for further recommendations.        REVIEW OF SYSTEMS  [  ] ROS unobtainable because:    [  ] All other systems negative except as noted below    Constitutional:  [ ] fever [ ] chills  [ ] weight loss  [ ]night sweat  [ ]poor appetite/PO intake [ ]fatigue   Skin:  [ ] rash [ ] phlebitis	  Eyes: [ ] icterus [ ] pain  [ ] discharge	  ENMT: [ ] sore throat  [ ] thrush [ ] ulcers [ ] exudates [ ]anosmia  Respiratory: [ ] dyspnea [ ] hemoptysis [ ] cough [ ] sputum	  Cardiovascular:  [ ] chest pain [ ] palpitations [ ] edema	  Gastrointestinal:  [ ] nausea [ ] vomiting [ ] diarrhea [ ] constipation [ ] pain	  Genitourinary:  [ ] dysuria [ ] frequency [ ] hematuria [ ] discharge [ ] flank pain  [ ] incontinence  Musculoskeletal:  [ ] myalgias [ ] arthralgias [ ] arthritis  [ ] back pain  Neurological:  [ ] headache [ ] weakness [ ] seizures  [ ] confusion/altered mental status    prior hospital charts reviewed [V]  primary team notes reviewed [V]  other consultant notes reviewed [V]    PAST MEDICAL & SURGICAL HISTORY:  Chronic idiopathic pseudo-obstruction of intestine    Breast cancer    HLD (hyperlipidemia)    GERD (gastroesophageal reflux disease)    Osteoporosis    S/P small bowel resection    SOCIAL HISTORY:  - Denied smoking/vaping/alcohol/recreational drug use    FAMILY HISTORY:    Allergies  penicillins (Unknown)  sulfa drugs (Hives)    ANTIMICROBIALS:  cefTRIAXone   IVPB 2000 every 24 hours    ANTIMICROBIALS (past 90 days):  MEDICATIONS  (STANDING):  cefepime   IVPB   100 mL/Hr IV Intermittent (23 @ 12:53)    cefepime   IVPB   100 mL/Hr IV Intermittent (23 @ 11:50)    cefTRIAXone   IVPB   100 mL/Hr IV Intermittent (23 @ 21:30)    OTHER MEDS:   MEDICATIONS  (STANDING):  acetaminophen     Tablet .. 650 every 6 hours PRN  aluminum hydroxide/magnesium hydroxide/simethicone Suspension 30 every 4 hours PRN  famotidine    Tablet 20 <User Schedule>  glucagon  Injectable 1 once  heparin   Injectable 5000 every 12 hours  melatonin 3 at bedtime PRN  morphine  - Injectable 2 every 4 hours PRN  morphine  - Injectable 4 every 4 hours PRN  ondansetron Injectable 4 every 8 hours PRN  pantoprazole   Suspension 40 daily  simvastatin 10 at bedtime    VITALS:  Vital Signs Last 24 Hrs  T(F): 97.9 (23 @ 09:17), Max: 99.2 (23 @ 05:13)    Vital Signs Last 24 Hrs  HR: 96 (23 @ 09:17) (77 - 99)  BP: 133/56 (23 @ 09:17) (104/63 - 133/56)  RR: 18 (23 @ 09:17)  SpO2: 95% (23 @ 09:17) (92% - 98%)  Wt(kg): --    EXAM:  Physical Exam:  Constitutional:  well preserved, comfortable  Head/Eyes: no icterus, PERRL, EOMI  ENT:  supple; no thrush  LUNGS:  CTA  CVS:  normal S1, S2, no murmur  Abd:  soft, non-tender; non-distended  Ext:  no edema  Vascular:  IV site no erythema tenderness or discharge  MSK:  joints without swelling  Neuro: AAO X 3, non- focal    Labs:                        9.2    5.63  )-----------( 209      ( 2023 07:22 )             27.7     04-05    134<L>  |  103  |  49<H>  ----------------------------<  89  3.0<L>   |  15<L>  |  1.53<H>    Ca    7.3<L>      2023 07:22  Phos  7.0     04-04  Mg     2.4     04-04    TPro  5.8<L>  /  Alb  3.1<L>  /  TBili  1.5<H>  /  DBili  x   /  AST  12  /  ALT  10  /  AlkPhos  54      WBC Trend:  WBC Count: 5.63 (23 @ 07:22)  WBC Count: 3.49 (23 @ 07:07)  WBC Count: 4.01 (23 @ 11:14)    Auto Neutrophil #: 4.40 K/uL (23 @ 07:22)  Band Neutrophils %: 8.8 % (23 @ 07:22)  Auto Neutrophil #: 2.48 K/uL (23 @ 07:07)  Band Neutrophils %: 7.0 % (23 @ 07:07)  Auto Neutrophil #: 2.84 K/uL (23 @ 11:14)    Creatine Trend:  Creatinine, Serum: 1.53 ()  Creatinine, Serum: 2.06 ()  Creatinine, Serum: 1.82 ()  Creatinine, Serum: 1.77 ()    Liver Biochemical Testing Trend:  Alanine Aminotransferase (ALT/SGPT): 10 ()  Alanine Aminotransferase (ALT/SGPT): 13 ()  Alanine Aminotransferase (ALT/SGPT): 15 ()  Aspartate Aminotransferase (AST/SGOT): 12 (23 @ 07:07)  Aspartate Aminotransferase (AST/SGOT): 14 (23 @ 13:57)  Aspartate Aminotransferase (AST/SGOT): 21 (23 @ 11:14)  Bilirubin Total, Serum: 1.5 ()  Bilirubin Total, Serum: 1.7 ()  Bilirubin Total, Serum: 2.0 ()    Trend LDH    Auto Eosinophil %: 0.0 % (23 @ 07:22)  Auto Eosinophil %: 0.0 % (23 @ 07:07)  Auto Eosinophil %: 0.0 % (23 @ 11:14)    Urinalysis Basic - ( 2023 13:04 )    Color: Yellow / Appearance: Clear / S.017 / pH: x  Gluc: x / Ketone: Negative  / Bili: Negative / Urobili: Negative   Blood: x / Protein: 30 mg/dL / Nitrite: Negative   Leuk Esterase: Negative / RBC: 4 /hpf / WBC 1 /HPF   Sq Epi: x / Non Sq Epi: 5 /hpf / Bacteria: Occasional    MICROBIOLOGY:    Culture - Urine (collected 2023 15:49)  Source: Clean Catch Clean Catch (Midstream)  Final Report:    <10,000 CFU/mL Normal Urogenital Ling    Culture - Blood (collected 2023 12:55)  Source: .Blood Blood-Venous  Preliminary Report:    Growth in aerobic and anaerobic bottles: Escherichia coli    ***Blood Panel PCR results on this specimen are available    approximately 3 hours after the Gram stain result.***    Gram stain, PCR, and/or culture results may not always    correspond due to difference in methodologies.    ************************************************************    This PCR assay was performed by multiplex PCR. This    Assay tests for 66 bacterial and resistance gene targets.    Please refer to the [facility] test directory    at [email or web address] for details.  Organism: Blood Culture PCR  Organism: Blood Culture PCR    Sensitivities:      Method Type: PCR      -  Escherichia coli: Detec    Culture - Blood (collected 2023 12:40)  Source: .Blood Blood-Peripheral  Preliminary Report:    Growth in aerobic and anaerobic bottles: Escherichia coli    See previous culture [id number]  Lactate, Blood: 0.9 ( @ 07:22)  Blood Gas Venous - Lactate: 1.7 ( @ 07:08)  Lactate, Blood: 2.5 ( @ 20:39)  Lactate, Blood: 4.8 ( @ 17:19)    A1C with Estimated Average Glucose Result: 5.3 % (23 @ 07:22)    RADIOLOGY:  imaging below personally reviewed    < from: CT Abdomen and Pelvis w/ Oral Cont (23 @ 15:21) >  IMPRESSION:  Marked dilatation of small and large bowel to the level of the rectum   without evidence of an obstructing mass. Degree of dilatation as   described above suggests chronic intestinal pseudoobstruction. Correlate   with known history. No ascites.    Focal wall thickening inthe right colon for which colonoscopy is   recommended to exclude a mass lesion.    --- End of Report ---    < end of copied text >  < from: Xray Abdomen 1 View PORTABLE -Routine (Xray Abdomen 1 View PORTABLE -Routine in AM.) (23 @ 07:07) >  IMPRESSION:  Enteric tube side port is within the esophagus.    Less dilatation of small bowel loops.    --- End of Report ---    < end of copied text >         HPI:    78 year old Female with PMHx of HLP, GERD, osteoporosis, SBP s/p exlap, breast Ca s/p lumpectomy + rt, p/w abdominal pain for the last few days, mainly over lower abdomen, with nausea and constipation. Patient has known h/o of intestinal dysmotility, and takes reglan + miralax at home for constipation. Normally has 2-3 BM daily, but has not had BM for the last few days. In ER, found to have dilated small and large bowel iso cipo + focal wall thickening in right colon r/o mass lesion.  Blood Cultures came back positive for E coli.    ID consulted for further recommendations.      REVIEW OF SYSTEMS  [  ] ROS unobtainable because:    [X] All other systems negative except as noted below    Constitutional:  [ ] fever [ ] chills  [ ] weight loss  [ ]night sweat  [ ]poor appetite/PO intake [ ]fatigue   Skin:  [ ] rash [ ] phlebitis	  Eyes: [ ] icterus [ ] pain  [ ] discharge	  ENMT: [ ] sore throat  [ ] thrush [ ] ulcers [ ] exudates [ ]anosmia  Respiratory: [ ] dyspnea [ ] hemoptysis [ ] cough [ ] sputum	  Cardiovascular:  [ ] chest pain [ ] palpitations [ ] edema	  Gastrointestinal:  [ ] nausea [ ] vomiting [ ] diarrhea [ ] constipation [X] pain [x] distension 	  Genitourinary:  [ ] dysuria [ ] frequency [ ] hematuria [ ] discharge [ ] flank pain  [ ] incontinence  Musculoskeletal:  [ ] myalgias [ ] arthralgias [ ] arthritis  [ ] back pain  Neurological:  [ ] headache [ ] weakness [ ] seizures  [ ] confusion/altered mental status    prior hospital charts reviewed [V]  primary team notes reviewed [V]  other consultant notes reviewed [V]    PAST MEDICAL & SURGICAL HISTORY:  Chronic idiopathic pseudo-obstruction of intestine    Breast cancer    HLD (hyperlipidemia)    GERD (gastroesophageal reflux disease)    Osteoporosis    S/P small bowel resection    SOCIAL HISTORY:  - Denied smoking/vaping/alcohol/recreational drug use    FAMILY HISTORY:    Allergies  penicillins (Unknown)  sulfa drugs (Hives)    ANTIMICROBIALS:  cefTRIAXone   IVPB 2000 every 24 hours    ANTIMICROBIALS (past 90 days):  MEDICATIONS  (STANDING):  cefepime   IVPB   100 mL/Hr IV Intermittent (23 @ 12:53)    cefepime   IVPB   100 mL/Hr IV Intermittent (04-04-23 @ 11:50)    cefTRIAXone   IVPB   100 mL/Hr IV Intermittent (23 @ 21:30)    OTHER MEDS:   MEDICATIONS  (STANDING):  acetaminophen     Tablet .. 650 every 6 hours PRN  aluminum hydroxide/magnesium hydroxide/simethicone Suspension 30 every 4 hours PRN  famotidine    Tablet 20 <User Schedule>  glucagon  Injectable 1 once  heparin   Injectable 5000 every 12 hours  melatonin 3 at bedtime PRN  morphine  - Injectable 2 every 4 hours PRN  morphine  - Injectable 4 every 4 hours PRN  ondansetron Injectable 4 every 8 hours PRN  pantoprazole   Suspension 40 daily  simvastatin 10 at bedtime    VITALS:  Vital Signs Last 24 Hrs  T(F): 97.9 (23 @ 09:17), Max: 99.2 (23 @ 05:13)    Vital Signs Last 24 Hrs  HR: 96 (23 @ 09:17) (77 - 99)  BP: 133/56 (23 @ 09:17) (104/63 - 133/56)  RR: 18 (23 @ 09:17)  SpO2: 95% (23 @ 09:17) (92% - 98%)  Wt(kg): --    EXAM:  Physical Exam:  Constitutional:  well preserved, comfortable  Head/Eyes: no icterus, PERRL, EOMI  ENT:  supple; no thrush  LUNGS:  CTA  CVS:  normal S1, S2, no murmur  Abd:  soft, distended, mildly tender   Ext:  no edema  Vascular:  IV site no erythema tenderness or discharge  MSK:  joints without swelling  Neuro: AAO X 3, non- focal    Labs:                        9.2    5.63  )-----------( 209      ( 2023 07:22 )             27.7     04-05    134<L>  |  103  |  49<H>  ----------------------------<  89  3.0<L>   |  15<L>  |  1.53<H>    Ca    7.3<L>      2023 07:22  Phos  7.0     04-04  Mg     2.4     04-04    TPro  5.8<L>  /  Alb  3.1<L>  /  TBili  1.5<H>  /  DBili  x   /  AST  12  /  ALT  10  /  AlkPhos  54  04    WBC Trend:  WBC Count: 5.63 (23 @ 07:22)  WBC Count: 3.49 (23 @ 07:07)  WBC Count: 4.01 (23 @ 11:14)    Auto Neutrophil #: 4.40 K/uL (23 @ 07:22)  Band Neutrophils %: 8.8 % (23 @ 07:22)  Auto Neutrophil #: 2.48 K/uL (23 @ 07:07)  Band Neutrophils %: 7.0 % (23 @ 07:07)  Auto Neutrophil #: 2.84 K/uL (23 @ 11:14)    Creatine Trend:  Creatinine, Serum: 1.53 ()  Creatinine, Serum: 2.06 ()  Creatinine, Serum: 1.82 ()  Creatinine, Serum: 1.77 ()    Liver Biochemical Testing Trend:  Alanine Aminotransferase (ALT/SGPT): 10 ()  Alanine Aminotransferase (ALT/SGPT): 13 ()  Alanine Aminotransferase (ALT/SGPT): 15 ()  Aspartate Aminotransferase (AST/SGOT): 12 (23 @ 07:07)  Aspartate Aminotransferase (AST/SGOT): 14 (23 @ 13:57)  Aspartate Aminotransferase (AST/SGOT): 21 (23 @ 11:14)  Bilirubin Total, Serum: 1.5 ()  Bilirubin Total, Serum: 1.7 ()  Bilirubin Total, Serum: 2.0 ()    Trend LDH    Auto Eosinophil %: 0.0 % (23 @ 07:22)  Auto Eosinophil %: 0.0 % (23 @ 07:07)  Auto Eosinophil %: 0.0 % (23 @ 11:14)    Urinalysis Basic - ( 2023 13:04 )    Color: Yellow / Appearance: Clear / S.017 / pH: x  Gluc: x / Ketone: Negative  / Bili: Negative / Urobili: Negative   Blood: x / Protein: 30 mg/dL / Nitrite: Negative   Leuk Esterase: Negative / RBC: 4 /hpf / WBC 1 /HPF   Sq Epi: x / Non Sq Epi: 5 /hpf / Bacteria: Occasional    MICROBIOLOGY:    Culture - Urine (collected 2023 15:49)  Source: Clean Catch Clean Catch (Midstream)  Final Report:    <10,000 CFU/mL Normal Urogenital Ling    Culture - Blood (collected 2023 12:55)  Source: .Blood Blood-Venous  Preliminary Report:    Growth in aerobic and anaerobic bottles: Escherichia coli    ***Blood Panel PCR results on this specimen are available    approximately 3 hours after the Gram stain result.***    Gram stain, PCR, and/or culture results may not always    correspond due to difference in methodologies.    ************************************************************    This PCR assay was performed by multiplex PCR. This    Assay tests for 66 bacterial and resistance gene targets.    Please refer to the [facility] test directory    at [email or web address] for details.  Organism: Blood Culture PCR  Organism: Blood Culture PCR    Sensitivities:      Method Type: PCR      -  Escherichia coli: Detec    Culture - Blood (collected 2023 12:40)  Source: .Blood Blood-Peripheral  Preliminary Report:    Growth in aerobic and anaerobic bottles: Escherichia coli    See previous culture [id number]  Lactate, Blood: 0.9 ( @ 07:22)  Blood Gas Venous - Lactate: 1.7 ( @ 07:08)  Lactate, Blood: 2.5 ( @ 20:39)  Lactate, Blood: 4.8 ( @ 17:19)    A1C with Estimated Average Glucose Result: 5.3 % (23 @ 07:22)    RADIOLOGY:  imaging below personally reviewed    < from: CT Abdomen and Pelvis w/ Oral Cont (23 @ 15:21) >  IMPRESSION:  Marked dilatation of small and large bowel to the level of the rectum   without evidence of an obstructing mass. Degree of dilatation as   described above suggests chronic intestinal pseudoobstruction. Correlate   with known history. No ascites.    Focal wall thickening inthe right colon for which colonoscopy is   recommended to exclude a mass lesion.    --- End of Report ---    < end of copied text >  < from: Xray Abdomen 1 View PORTABLE -Routine (Xray Abdomen 1 View PORTABLE -Routine in AM.) (23 @ 07:07) >  IMPRESSION:  Enteric tube side port is within the esophagus.    Less dilatation of small bowel loops.    --- End of Report ---    < end of copied text >

## 2023-04-05 NOTE — PROGRESS NOTE ADULT - ASSESSMENT
78 F w acute on chronic small and large bowel ileus, complicated by GNR bacteremia    1. 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 Now improved. Lactate decreased  - NGT removed  - okay to DC rectal tube  - advance diet as tolerated     2. GNR bacteremia. ?translocation   -cefepime/flagyl per ID    3. mild bilirubinemia          Attending supervision statement: I have personally seen and examined the patient. I fully participated in the care of this patient. I have made amendments to the documentation where necessary, and agree with the history, physical exam, and plan as outlined by the ACP.    [facility]  Patel Zepeda M.D.   [address], [place]  Office: [phone]

## 2023-04-05 NOTE — PROGRESS NOTE ADULT - SUBJECTIVE AND OBJECTIVE BOX
SURGERY DAILY PROGRESS NOTE:       Subjective / Overnight events:      Objective:      MEDICATIONS  (STANDING):  cefTRIAXone   IVPB 2000 milliGRAM(s) IV Intermittent every 24 hours  famotidine    Tablet 20 milliGRAM(s) Oral <User Schedule>  glucagon  Injectable 1 milliGRAM(s) IntraMuscular once  heparin   Injectable 5000 Unit(s) SubCutaneous every 12 hours  naloxone Injectable 0.4 milliGRAM(s) IV Push once  pantoprazole   Suspension 40 milliGRAM(s) Oral daily  simvastatin 10 milliGRAM(s) Oral at bedtime    MEDICATIONS  (PRN):  acetaminophen     Tablet .. 650 milliGRAM(s) Oral every 6 hours PRN Temp greater or equal to 38C (100.4F), Mild Pain (1 - 3)  aluminum hydroxide/magnesium hydroxide/simethicone Suspension 30 milliLiter(s) Oral every 4 hours PRN Dyspepsia  melatonin 3 milliGRAM(s) Oral at bedtime PRN Insomnia  morphine  - Injectable 2 milliGRAM(s) IV Push every 4 hours PRN Moderate Pain (4 - 6)  morphine  - Injectable 4 milliGRAM(s) IV Push every 4 hours PRN Severe Pain (7 - 10)  ondansetron Injectable 4 milliGRAM(s) IV Push every 8 hours PRN Nausea and/or Vomiting      Vital Signs Last 24 Hrs  T(C): 37 (2023 06:13), Max: 37.1 (2023 20:20)  T(F): 98.6 (2023 06:13), Max: 98.7 (2023 20:20)  HR: 96 (2023 06:13) (77 - 99)  BP: 115/59 (2023 06:13) (104/63 - 122/48)  BP(mean): --  RR: 18 (2023 06:13) (17 - 18)  SpO2: 92% (2023 06:13) (92% - 98%)    Parameters below as of 2023 06:13  Patient On (Oxygen Delivery Method): nasal cannula  O2 Flow (L/min): 1      I&O's Detail    2023 07:01  -  2023 07:00  --------------------------------------------------------  IN:    IV PiggyBack: 200 mL    Oral Fluid: 1300 mL    sodium chloride 0.9%: 625 mL  Total IN: 2125 mL    OUT:    Intermittent Catheterization - Urethral (mL): 2650 mL  Total OUT: 2650 mL    Total NET: -525 mL          Daily     Daily     LABS:                        10.6   3.49  )-----------( 208      ( 2023 07:07 )             31.4     04-04    134<L>  |  98  |  66<H>  ----------------------------<  121<H>  3.1<L>   |  17<L>  |  2.06<H>    Ca    6.6<L>      2023 07:07  Phos  7.0     04-04  Mg     2.4     04-04    TPro  5.8<L>  /  Alb  3.1<L>  /  TBili  1.5<H>  /  DBili  x   /  AST  12  /  ALT  10  /  AlkPhos  54  04-04      Urinalysis Basic - ( 2023 13:04 )    Color: Yellow / Appearance: Clear / S.017 / pH: x  Gluc: x / Ketone: Negative  / Bili: Negative / Urobili: Negative   Blood: x / Protein: 30 mg/dL / Nitrite: Negative   Leuk Esterase: Negative / RBC: 4 /hpf / WBC 1 /HPF   Sq Epi: x / Non Sq Epi: 5 /hpf / Bacteria: Occasional          PHYSICAL EXAM  --------------------------------------------------------------------------------     SURGERY DAILY PROGRESS NOTE:       Subjective / Overnight events: Patient seen and examined. No events overnight. Passing BMs, abdominal pain improved.      Objective:      MEDICATIONS  (STANDING):  cefTRIAXone   IVPB 2000 milliGRAM(s) IV Intermittent every 24 hours  famotidine    Tablet 20 milliGRAM(s) Oral <User Schedule>  glucagon  Injectable 1 milliGRAM(s) IntraMuscular once  heparin   Injectable 5000 Unit(s) SubCutaneous every 12 hours  naloxone Injectable 0.4 milliGRAM(s) IV Push once  pantoprazole   Suspension 40 milliGRAM(s) Oral daily  simvastatin 10 milliGRAM(s) Oral at bedtime    MEDICATIONS  (PRN):  acetaminophen     Tablet .. 650 milliGRAM(s) Oral every 6 hours PRN Temp greater or equal to 38C (100.4F), Mild Pain (1 - 3)  aluminum hydroxide/magnesium hydroxide/simethicone Suspension 30 milliLiter(s) Oral every 4 hours PRN Dyspepsia  melatonin 3 milliGRAM(s) Oral at bedtime PRN Insomnia  morphine  - Injectable 2 milliGRAM(s) IV Push every 4 hours PRN Moderate Pain (4 - 6)  morphine  - Injectable 4 milliGRAM(s) IV Push every 4 hours PRN Severe Pain (7 - 10)  ondansetron Injectable 4 milliGRAM(s) IV Push every 8 hours PRN Nausea and/or Vomiting      Vital Signs Last 24 Hrs  T(C): 37 (2023 06:13), Max: 37.1 (2023 20:20)  T(F): 98.6 (2023 06:13), Max: 98.7 (2023 20:20)  HR: 96 (2023 06:13) (77 - 99)  BP: 115/59 (2023 06:13) (104/63 - 122/48)  BP(mean): --  RR: 18 (2023 06:13) (17 - 18)  SpO2: 92% (2023 06:13) (92% - 98%)    Parameters below as of 2023 06:13  Patient On (Oxygen Delivery Method): nasal cannula  O2 Flow (L/min): 1      I&O's Detail    2023 07:01  -  2023 07:00  --------------------------------------------------------  IN:    IV PiggyBack: 200 mL    Oral Fluid: 1300 mL    sodium chloride 0.9%: 625 mL  Total IN: 2125 mL    OUT:    Intermittent Catheterization - Urethral (mL): 2650 mL  Total OUT: 2650 mL    Total NET: -525 mL      Exam:  General: resting in stretcher, NAD  Resp: nonlabored breathing  Abd: midline laparotomy scar; soft, distended, mild LLQ>RLQ TTP; no rebound or guarding  Ext: WWP  Neuro: AAO x 3    LABS:                        10.6   3.49  )-----------( 208      ( 2023 07:07 )             31.4     04-04    134<L>  |  98  |  66<H>  ----------------------------<  121<H>  3.1<L>   |  17<L>  |  2.06<H>    Ca    6.6<L>      2023 07:07  Phos  7.0     04-04  Mg     2.4     04-04    TPro  5.8<L>  /  Alb  3.1<L>  /  TBili  1.5<H>  /  DBili  x   /  AST  12  /  ALT  10  /  AlkPhos  54  04-04      Urinalysis Basic - ( 2023 13:04 )    Color: Yellow / Appearance: Clear / S.017 / pH: x  Gluc: x / Ketone: Negative  / Bili: Negative / Urobili: Negative   Blood: x / Protein: 30 mg/dL / Nitrite: Negative   Leuk Esterase: Negative / RBC: 4 /hpf / WBC 1 /HPF   Sq Epi: x / Non Sq Epi: 5 /hpf / Bacteria: Occasional          PHYSICAL EXAM  --------------------------------------------------------------------------------

## 2023-04-05 NOTE — PROVIDER CONTACT NOTE (OTHER) - ASSESSMENT
Pt bladder scan showed 452. pt states it is difficult to urinate with the rectal tube in place and she is afraid to attempt. Pt concerned about rectal tube falling out while attempting to urinate
Pt voided 200cc; post void 308. Pt denies the urge to urinate. Pt educated on importance of ambulating. Pt denies pain or discomfort.
Pt PVR 505cc. pt voided after rectal tube removed and voided unsaved. Pt states she feels she voided a lot. Pt states she does not feel the need to void again. Bladder scan showed 505cc

## 2023-04-05 NOTE — CONSULT NOTE ADULT - ATTENDING SUPERVISION STATEMENT
4/18/2018      To Whom It May Concern:    Rossy Meza was seen in the office on 4/16/18 and is currently under my medical care. Please excuse him from work for 3 days. He may return to work on 4/19/18.   If you require additional information ple
Resident
Fellow

## 2023-04-05 NOTE — PROGRESS NOTE ADULT - ATTENDING COMMENTS
Feels back to baseline.  Abdomen softly distended.  Multiple BMs.  D/c rectal tube.  Further management as per GI and ID, no acute surgical issues at this time. Feels back to baseline.  Abdomen softly distended.  Multiple BMs.  D/c rectal tube.  Blood Cx with E. coli - ? translocation.  Overall stable and with no acute surgical issues at this time.   Further management as per GI and ID.  Pls call back with questions/concerns. Feels back to baseline.  Abdomen softly distended, NT.  Multiple BMs.  D/c rectal tube.  Blood Cx with E. coli - ? translocation.  Overall stable and with no acute surgical issues at this time.   Further management as per GI and ID.  Pls call back with questions/concerns.

## 2023-04-05 NOTE — PROGRESS NOTE ADULT - ASSESSMENT
79 yo F with pmh SBO s/p ex-lap, chronic intestinal pseudoobstruction, breast ca s/p lumpectomy + radiation therapy, p/w abdominal pain, found to have dilated small and large bowel secondary to pseudoobstruction, as well as lactic acidosis and gram negative doc bacteremia of presumed GI source

## 2023-04-05 NOTE — PROGRESS NOTE ADULT - SUBJECTIVE AND OBJECTIVE BOX
Chief Complaint:  Patient is a 78y old  Female who presents with a chief complaint of abdominal pain (2023 07:30)      Date of service 23 @ 11:34      Interval Events:   Patient seen and examined.   Requesting rectal tube be removed   as she feels bloated with it in.     Hospital Medications:  acetaminophen     Tablet .. 650 milliGRAM(s) Oral every 6 hours PRN  aluminum hydroxide/magnesium hydroxide/simethicone Suspension 30 milliLiter(s) Oral every 4 hours PRN  cefTRIAXone   IVPB 2000 milliGRAM(s) IV Intermittent every 24 hours  famotidine    Tablet 20 milliGRAM(s) Oral <User Schedule>  glucagon  Injectable 1 milliGRAM(s) IntraMuscular once  heparin   Injectable 5000 Unit(s) SubCutaneous every 12 hours  melatonin 3 milliGRAM(s) Oral at bedtime PRN  morphine  - Injectable 2 milliGRAM(s) IV Push every 4 hours PRN  morphine  - Injectable 4 milliGRAM(s) IV Push every 4 hours PRN  naloxone Injectable 0.4 milliGRAM(s) IV Push once  ondansetron Injectable 4 milliGRAM(s) IV Push every 8 hours PRN  pantoprazole   Suspension 40 milliGRAM(s) Oral daily  potassium chloride    Tablet ER 40 milliEquivalent(s) Oral every 4 hours  simvastatin 10 milliGRAM(s) Oral at bedtime        Review of Systems:  General:  No wt loss, fevers, chills, night sweats, fatigue,   Eyes:  Good vision, no reported pain  ENT:  No sore throat, pain, runny nose, dysphagia  CV:  No pain, palpitations, hypo/hypertension  Resp:  No dyspnea, cough, tachypnea, wheezing  GI:  See HPI  :  No pain, bleeding, incontinence, nocturia  Muscle:  No pain, weakness  Neuro:  No weakness, tingling, memory problems  Psych:  No fatigue, insomnia, mood problems, depression  Endocrine:  No polyuria, polydipsia, cold/heat intolerance  Heme:  No petechiae, ecchymosis, easy bruisability  Integumentary:  No rash, edema    PHYSICAL EXAM:   Vital Signs:  Vital Signs Last 24 Hrs  T(C): 36.6 (2023 09:17), Max: 37.1 (2023 20:20)  T(F): 97.9 (2023 09:17), Max: 98.7 (2023 20:20)  HR: 96 (2023 09:17) (77 - 99)  BP: 133/56 (2023 09:17) (104/63 - 133/56)  BP(mean): --  RR: 18 (2023 09:17) (18 - 18)  SpO2: 95% (2023 09:17) (92% - 98%)    Parameters below as of 2023 09:17  Patient On (Oxygen Delivery Method): room air      Daily     Daily       PHYSICAL EXAM:     GENERAL:  Appears stated age, well-groomed, well-nourished, no distress  HEENT:  NC/AT,  conjunctivae anicteric, clear and pink,   NECK: supple, trachea midline  CHEST:  Full & symmetric excursion, no increased effort, breath sounds clear  HEART:  Regular rhythm, no JVD  ABDOMEN:  Soft, non-tender, non-distended, normoactive bowel sounds,  no masses , no hepatosplenomegaly  EXTREMITIES:  no cyanosis,clubbing or edema  SKIN:  No rash, erythema, or, ecchymoses, no jaundice  NEURO:  Alert, non-focal, no asterixis  PSYCH: Appropriate affect, oriented to place and time  RECTAL: Deferred      LABS Personally reviewed by me:                        9.2    5.63  )-----------( 209      ( 2023 07:22 )             27.7     Mean Cell Volume: 85.0 fl (23 @ 07:22)    04-    134<L>  |  103  |  49<H>  ----------------------------<  89  3.0<L>   |  15<L>  |  1.53<H>    Ca    7.3<L>      2023 07:22  Phos  7.0     04-04  Mg     2.4     04-04    TPro  5.8<L>  /  Alb  3.1<L>  /  TBili  1.5<H>  /  DBili  x   /  AST  12  /  ALT  10  /  AlkPhos  54  04-04    LIVER FUNCTIONS - ( 2023 07:07 )  Alb: 3.1 g/dL / Pro: 5.8 g/dL / ALK PHOS: 54 U/L / ALT: 10 U/L / AST: 12 U/L / GGT: x             Urinalysis Basic - ( 2023 13:04 )    Color: Yellow / Appearance: Clear / S.017 / pH: x  Gluc: x / Ketone: Negative  / Bili: Negative / Urobili: Negative   Blood: x / Protein: 30 mg/dL / Nitrite: Negative   Leuk Esterase: Negative / RBC: 4 /hpf / WBC 1 /HPF   Sq Epi: x / Non Sq Epi: 5 /hpf / Bacteria: Occasional                              9.2    5.63  )-----------( 209      ( 2023 07:22 )             27.7                         10.6   3.49  )-----------( 208      ( 2023 07:07 )             31.4                         13.4   4.01  )-----------( 279      ( 2023 11:14 )             40.3       Imaging personally reviewed by me:

## 2023-04-05 NOTE — CONSULT NOTE ADULT - ATTENDING COMMENTS
Reports feeling back to her baseline regarding her bloating.  + flatus and BMs.  Minimal lower abd discomfort.  Abdomen soft, NT, distended.  D/w GI, agree it is reasonable to remove the NGT (minimal output).  Pt with PSO and generalized GI dysmotility.  No indication for a surgical intervention.
78 F  PMHx: HLP, GERD, Osteoporosis, SBP s/p ex-lap, breast Ca s/p lumpectomy, radiation    Presenting with abd pain   Afebrile, WBC WNL though with 37% bandemia  CT a/p with small and large bowel dilatation without obstructing mass, chronic intestinal pseudoobstruction, focal wall thickening in the R colon for which colonoscopy is recommended to exclude a mass lesion  Lactate improved  Elevated Cr  Elevated T bili improving  High grade E. coli bacteremia  PCN allergy was in childhood, no allergic reaction to keflex in the past, but states all oral abx except clinda upsets her stomach    Recommend:  #High grade E. coli bacteremia from chronic intestinal pseudoobstruction, possible mass in R colon  -Change ceftriaxone to cefazolin 1 gram IV q 8 hours  -Repeat blood cultures x 2 sets  -GI and Surgery following   -Monitor fever curve  -Trend WBC  -Bandemia improving  -Trend LFTs/ T bili – improving  -R mass workup per GI     #BLAKE  -Continue to monitor CrCl    Minor Giraldo MD  Available through MS Teams  If no response, or after 5pm/weekends, call [phone]

## 2023-04-05 NOTE — PROGRESS NOTE ADULT - SUBJECTIVE AND OBJECTIVE BOX
[facility] Division of Hospital Medicine  Immanuel Solorzano MD  Available via MS Teams  Pager: [phone]    SUBJECTIVE / OVERNIGHT EVENTS: No events overnight. Rectal tube has been removed and she is reporting much relief. States that her abdominal distension is at her baseline and LLQ discomfort is also improved. Feels that she is able to pass gas. Denies nausea/vomiting. Denies fever/chills. Denies urinary symptoms.    MEDICATIONS  (STANDING):  cefTRIAXone   IVPB 2000 milliGRAM(s) IV Intermittent every 24 hours  famotidine    Tablet 20 milliGRAM(s) Oral <User Schedule>  glucagon  Injectable 1 milliGRAM(s) IntraMuscular once  heparin   Injectable 5000 Unit(s) SubCutaneous every 12 hours  naloxone Injectable 0.4 milliGRAM(s) IV Push once  pantoprazole   Suspension 40 milliGRAM(s) Oral daily  potassium chloride    Tablet ER 40 milliEquivalent(s) Oral every 4 hours  simvastatin 10 milliGRAM(s) Oral at bedtime    MEDICATIONS  (PRN):  acetaminophen     Tablet .. 650 milliGRAM(s) Oral every 6 hours PRN Temp greater or equal to 38C (100.4F), Mild Pain (1 - 3)  aluminum hydroxide/magnesium hydroxide/simethicone Suspension 30 milliLiter(s) Oral every 4 hours PRN Dyspepsia  melatonin 3 milliGRAM(s) Oral at bedtime PRN Insomnia  morphine  - Injectable 2 milliGRAM(s) IV Push every 4 hours PRN Moderate Pain (4 - 6)  morphine  - Injectable 4 milliGRAM(s) IV Push every 4 hours PRN Severe Pain (7 - 10)  ondansetron Injectable 4 milliGRAM(s) IV Push every 8 hours PRN Nausea and/or Vomiting      I&O's Summary    2023 07:  -  2023 07:00  --------------------------------------------------------  IN: 2125 mL / OUT: 2650 mL / NET: -525 mL    2023 07:01  -  2023 13:23  --------------------------------------------------------  IN: 320 mL / OUT: 0 mL / NET: 320 mL        PHYSICAL EXAM:  Vital Signs Last 24 Hrs  T(C): 36.6 (2023 09:17), Max: 37.1 (2023 20:20)  T(F): 97.9 (2023 09:17), Max: 98.7 (2023 20:20)  HR: 96 (2023 09:17) (77 - 99)  BP: 133/56 (2023 09:17) (104/63 - 133/56)  BP(mean): --  RR: 18 (2023 09:17) (18 - 18)  SpO2: 95% (2023 09:17) (92% - 98%)    Parameters below as of 2023 09:17  Patient On (Oxygen Delivery Method): room air    CONSTITUTIONAL: NAD, well-developed, well-groomed  ENMT: Moist oral mucosa, no pharyngeal injection or exudates  NECK: Supple, no palpable masses; no thyromegaly  RESPIRATORY: Normal respiratory effort; lungs are clear to auscultation bilaterally  CARDIOVASCULAR: Regular rate and rhythm, normal S1 and S2, no murmur/rub/gallop; No lower extremity edema; Peripheral pulses are 2+ bilaterally  ABDOMEN: Nontender to palpation, distended, tympanic to percussion, non-tender to palpation diffusely. hypoactive bowel sounds, no rebound/guarding; No hepatosplenomegaly  MUSCULOSKELETAL:  No clubbing or cyanosis of digits; no joint swelling or tenderness to palpation  PSYCH: A+O to person, place, and time; affect appropriate  NEUROLOGY: CN 2-12 are intact and symmetric; no gross sensory deficits   SKIN: No rashes; no palpable lesions    LABS:                        9.2    5.63  )-----------( 209      ( 2023 07:22 )             27.7     04-    134<L>  |  103  |  49<H>  ----------------------------<  89  3.0<L>   |  15<L>  |  1.53<H>    Ca    7.3<L>      2023 07:22  Phos  7.0     04-04  Mg     2.4     04-04    TPro  5.8<L>  /  Alb  3.1<L>  /  TBili  1.5<H>  /  DBili  x   /  AST  12  /  ALT  10  /  AlkPhos  54  04-04          Urinalysis Basic - ( 2023 13:04 )    Color: Yellow / Appearance: Clear / S.017 / pH: x  Gluc: x / Ketone: Negative  / Bili: Negative / Urobili: Negative   Blood: x / Protein: 30 mg/dL / Nitrite: Negative   Leuk Esterase: Negative / RBC: 4 /hpf / WBC 1 /HPF   Sq Epi: x / Non Sq Epi: 5 /hpf / Bacteria: Occasional        Culture - Urine (collected 2023 15:49)  Source: Clean Catch Clean Catch (Midstream)  Final Report (2023 20:14):    <10,000 CFU/mL Normal Urogenital Ling    Culture - Blood (collected 2023 12:55)  Source: .Blood Blood-Venous  Gram Stain (2023 04:20):    Growth in aerobic bottle: Gram Negative Rods    Growth in anaerobic bottle: Gram Negative Rods  Preliminary Report (2023 19:21):    Growth in aerobic and anaerobic bottles: Escherichia coli    ***Blood Panel PCR results on this specimen are available    approximately 3 hours after the Gram stain result.***    Gram stain, PCR, and/or culture results may not always    correspond due to difference in methodologies.    ************************************************************    This PCR assay was performed by multiplex PCR. This    Assay tests for 66 bacterial and resistance gene targets.    Please refer to the [facility] Labs test directory    at [email or web address] for details.  Organism: Blood Culture PCR (2023 04:33)  Organism: Blood Culture PCR (2023 04:33)    Culture - Blood (collected 2023 12:40)  Source: .Blood Blood-Peripheral  Gram Stain (2023 04:21):    Growth in aerobic bottle: Gram Negative Rods    Growth in anaerobic bottle: Gram Negative Rods  Preliminary Report (2023 19:09):    Growth in aerobic and anaerobic bottles: Escherichia coli    See previous culture [id number]      RADIOLOGY & ADDITIONAL TESTS:  Other Results Reviewed Today: Labs as above

## 2023-04-05 NOTE — PROGRESS NOTE ADULT - ASSESSMENT
78F hx of intestinal dysmotility with chronic pseudoobstruction, SBO s/p exlap with ?intestinal pexy, hysterectomy, presents with abdominal pain x 5d, found to have chronic intestinal pseudoobsturction on CT. Patient hemodynamically stable, nonperitoneal on exam, with normal WBC, elevated lactated, likely 2/2 dehydration    Recommendation:  - No acute surgical intervention  - Can advance diet as tolerated  - can remove rectal tube, balloon with 45 cc's  - Serial abdominal exams  - please call back with any questions    Green surgery  [id number]

## 2023-04-05 NOTE — PROVIDER CONTACT NOTE (OTHER) - ACTION/TREATMENT ORDERED:
PA gave okay to remove rectal tube and ambulate to the bathroom to attempt to void. will continue to monitor

## 2023-04-06 LAB
ANION GAP SERPL CALC-SCNC: 13 MMOL/L — SIGNIFICANT CHANGE UP (ref 5–17)
BASOPHILS # BLD AUTO: 0.06 K/UL — SIGNIFICANT CHANGE UP (ref 0–0.2)
BASOPHILS NFR BLD AUTO: 0.9 % — SIGNIFICANT CHANGE UP (ref 0–2)
BUN SERPL-MCNC: 34 MG/DL — HIGH (ref 7–23)
CALCIUM SERPL-MCNC: 7.9 MG/DL — LOW (ref 8.4–10.5)
CHLORIDE SERPL-SCNC: 105 MMOL/L — SIGNIFICANT CHANGE UP (ref 96–108)
CO2 SERPL-SCNC: 15 MMOL/L — LOW (ref 22–31)
CREAT SERPL-MCNC: 1.23 MG/DL — SIGNIFICANT CHANGE UP (ref 0.5–1.3)
EGFR: 45 ML/MIN/1.73M2 — LOW
EOSINOPHIL # BLD AUTO: 0.01 K/UL — SIGNIFICANT CHANGE UP (ref 0–0.5)
EOSINOPHIL NFR BLD AUTO: 0.1 % — SIGNIFICANT CHANGE UP (ref 0–6)
GLUCOSE SERPL-MCNC: 89 MG/DL — SIGNIFICANT CHANGE UP (ref 70–99)
HCT VFR BLD CALC: 27.7 % — LOW (ref 34.5–45)
HGB BLD-MCNC: 9.1 G/DL — LOW (ref 11.5–15.5)
IMM GRANULOCYTES NFR BLD AUTO: 1.6 % — HIGH (ref 0–0.9)
LYMPHOCYTES # BLD AUTO: 0.41 K/UL — LOW (ref 1–3.3)
LYMPHOCYTES # BLD AUTO: 6 % — LOW (ref 13–44)
MCHC RBC-ENTMCNC: 28.1 PG — SIGNIFICANT CHANGE UP (ref 27–34)
MCHC RBC-ENTMCNC: 32.9 GM/DL — SIGNIFICANT CHANGE UP (ref 32–36)
MCV RBC AUTO: 85.5 FL — SIGNIFICANT CHANGE UP (ref 80–100)
MONOCYTES # BLD AUTO: 0.78 K/UL — SIGNIFICANT CHANGE UP (ref 0–0.9)
MONOCYTES NFR BLD AUTO: 11.4 % — SIGNIFICANT CHANGE UP (ref 2–14)
NEUTROPHILS # BLD AUTO: 5.5 K/UL — SIGNIFICANT CHANGE UP (ref 1.8–7.4)
NEUTROPHILS NFR BLD AUTO: 80 % — HIGH (ref 43–77)
NRBC # BLD: 0 /100 WBCS — SIGNIFICANT CHANGE UP (ref 0–0)
PLATELET # BLD AUTO: 242 K/UL — SIGNIFICANT CHANGE UP (ref 150–400)
POTASSIUM SERPL-MCNC: 3.3 MMOL/L — LOW (ref 3.5–5.3)
POTASSIUM SERPL-SCNC: 3.3 MMOL/L — LOW (ref 3.5–5.3)
RBC # BLD: 3.24 M/UL — LOW (ref 3.8–5.2)
RBC # FLD: 14.9 % — HIGH (ref 10.3–14.5)
SODIUM SERPL-SCNC: 133 MMOL/L — LOW (ref 135–145)
WBC # BLD: 6.87 K/UL — SIGNIFICANT CHANGE UP (ref 3.8–10.5)
WBC # FLD AUTO: 6.87 K/UL — SIGNIFICANT CHANGE UP (ref 3.8–10.5)

## 2023-04-06 PROCEDURE — 99232 SBSQ HOSP IP/OBS MODERATE 35: CPT

## 2023-04-06 RX ORDER — SODIUM BICARBONATE 1 MEQ/ML
650 SYRINGE (ML) INTRAVENOUS
Refills: 0 | Status: DISCONTINUED | OUTPATIENT
Start: 2023-04-06 | End: 2023-04-07

## 2023-04-06 RX ORDER — ENOXAPARIN SODIUM 100 MG/ML
40 INJECTION SUBCUTANEOUS EVERY 24 HOURS
Refills: 0 | Status: DISCONTINUED | OUTPATIENT
Start: 2023-04-06 | End: 2023-04-10

## 2023-04-06 RX ORDER — POTASSIUM CHLORIDE 20 MEQ
40 PACKET (EA) ORAL ONCE
Refills: 0 | Status: COMPLETED | OUTPATIENT
Start: 2023-04-06 | End: 2023-04-06

## 2023-04-06 RX ADMIN — Medication 650 MILLIGRAM(S): at 17:09

## 2023-04-06 RX ADMIN — SIMVASTATIN 10 MILLIGRAM(S): 20 TABLET, FILM COATED ORAL at 21:19

## 2023-04-06 RX ADMIN — Medication 100 MILLIGRAM(S): at 06:02

## 2023-04-06 RX ADMIN — HEPARIN SODIUM 5000 UNIT(S): 5000 INJECTION INTRAVENOUS; SUBCUTANEOUS at 06:00

## 2023-04-06 RX ADMIN — PANTOPRAZOLE SODIUM 40 MILLIGRAM(S): 20 TABLET, DELAYED RELEASE ORAL at 06:03

## 2023-04-06 RX ADMIN — Medication 40 MILLIEQUIVALENT(S): at 08:51

## 2023-04-06 RX ADMIN — Medication 100 MILLIGRAM(S): at 21:20

## 2023-04-06 RX ADMIN — ENOXAPARIN SODIUM 40 MILLIGRAM(S): 100 INJECTION SUBCUTANEOUS at 17:10

## 2023-04-06 RX ADMIN — Medication 100 MILLIGRAM(S): at 14:56

## 2023-04-06 NOTE — PROGRESS NOTE ADULT - SUBJECTIVE AND OBJECTIVE BOX
Chief Complaint:  Patient is a 78y old  Female who presents with a chief complaint of abdominal pain (2023 13:22)      Date of service 23 @ 10:15      Interval Events:   Patient seen and examined.   rectal tube discontinued. Feeling much better.   Passing BMs.   Tolerating diet    Hospital Medications:  acetaminophen     Tablet .. 650 milliGRAM(s) Oral every 6 hours PRN  aluminum hydroxide/magnesium hydroxide/simethicone Suspension 30 milliLiter(s) Oral every 4 hours PRN  ceFAZolin   IVPB 1000 milliGRAM(s) IV Intermittent every 8 hours  famotidine    Tablet 20 milliGRAM(s) Oral <User Schedule>  heparin   Injectable 5000 Unit(s) SubCutaneous every 12 hours  melatonin 3 milliGRAM(s) Oral at bedtime PRN  ondansetron Injectable 4 milliGRAM(s) IV Push every 8 hours PRN  pantoprazole    Tablet 40 milliGRAM(s) Oral before breakfast  simvastatin 10 milliGRAM(s) Oral at bedtime  sodium bicarbonate 650 milliGRAM(s) Oral two times a day        Review of Systems:  General:  No wt loss, fevers, chills, night sweats, fatigue,   Eyes:  Good vision, no reported pain  ENT:  No sore throat, pain, runny nose, dysphagia  CV:  No pain, palpitations, hypo/hypertension  Resp:  No dyspnea, cough, tachypnea, wheezing  GI:  See HPI  :  No pain, bleeding, incontinence, nocturia  Muscle:  No pain, weakness  Neuro:  No weakness, tingling, memory problems  Psych:  No fatigue, insomnia, mood problems, depression  Endocrine:  No polyuria, polydipsia, cold/heat intolerance  Heme:  No petechiae, ecchymosis, easy bruisability  Integumentary:  No rash, edema    PHYSICAL EXAM:   Vital Signs:  Vital Signs Last 24 Hrs  T(C): 36.6 (2023 09:27), Max: 37.1 (2023 05:09)  T(F): 97.9 (2023 09:27), Max: 98.8 (2023 05:09)  HR: 94 (2023 09:27) (87 - 99)  BP: 148/62 (2023 09:27) (122/67 - 148/62)  BP(mean): --  RR: 18 (2023 09:27) (18 - 18)  SpO2: 95% (2023 09:27) (92% - 95%)    Parameters below as of 2023 09:27  Patient On (Oxygen Delivery Method): room air      Daily     Daily       PHYSICAL EXAM:     GENERAL:  Appears stated age, well-groomed, well-nourished, no distress  HEENT:  NC/AT,  conjunctivae anicteric, clear and pink,   NECK: supple, trachea midline  CHEST:  Full & symmetric excursion, no increased effort, breath sounds clear  HEART:  Regular rhythm, no JVD  ABDOMEN:  Soft, non-tender, non-distended, normoactive bowel sounds,  no masses , no hepatosplenomegaly  EXTREMITIES:  no cyanosis,clubbing or edema  SKIN:  No rash, erythema, or, ecchymoses, no jaundice  NEURO:  Alert, non-focal, no asterixis  PSYCH: Appropriate affect, oriented to place and time  RECTAL: Deferred      LABS Personally reviewed by me:                        9.1    6.87  )-----------( 242      ( 2023 07:15 )             27.7     Mean Cell Volume: 85.5 fl (23 @ 07:15)    04-    133<L>  |  105  |  34<H>  ----------------------------<  89  3.3<L>   |  15<L>  |  1.23    Ca    7.9<L>      2023 07:15          Urinalysis Basic - ( 2023 13:04 )    Color: Yellow / Appearance: Clear / S.017 / pH: x  Gluc: x / Ketone: Negative  / Bili: Negative / Urobili: Negative   Blood: x / Protein: 30 mg/dL / Nitrite: Negative   Leuk Esterase: Negative / RBC: 4 /hpf / WBC 1 /HPF   Sq Epi: x / Non Sq Epi: 5 /hpf / Bacteria: Occasional                              9.1    6.87  )-----------( 242      ( 2023 07:15 )             27.7                         9.2    5.63  )-----------( 209      ( 2023 07:22 )             27.7                         10.6   3.49  )-----------( 208      ( 2023 07:07 )             31.4                         13.4   4.01  )-----------( 279      ( 2023 11:14 )             40.3       Imaging personally reviewed by me:

## 2023-04-06 NOTE — PROGRESS NOTE ADULT - SUBJECTIVE AND OBJECTIVE BOX
CC: Patient is a 78y old  Female who presents with a chief complaint of abdominal pain (2023 10:15)    ID following for E. coli bacteremia    Interval History/ROS: Patient with diarrhea. No fevers.    Rest of ROS negative.    Allergies  penicillins (Unknown)  sulfa drugs (Hives)    ANTIMICROBIALS:  ceFAZolin   IVPB 1000 every 8 hours    OTHER MEDS:  acetaminophen     Tablet .. 650 milliGRAM(s) Oral every 6 hours PRN  aluminum hydroxide/magnesium hydroxide/simethicone Suspension 30 milliLiter(s) Oral every 4 hours PRN  famotidine    Tablet 20 milliGRAM(s) Oral <User Schedule>  heparin   Injectable 5000 Unit(s) SubCutaneous every 12 hours  melatonin 3 milliGRAM(s) Oral at bedtime PRN  ondansetron Injectable 4 milliGRAM(s) IV Push every 8 hours PRN  pantoprazole    Tablet 40 milliGRAM(s) Oral before breakfast  simvastatin 10 milliGRAM(s) Oral at bedtime  sodium bicarbonate 650 milliGRAM(s) Oral two times a day    PE:    Vital Signs Last 24 Hrs  T(C): 36.6 (2023 09:27), Max: 37.1 (2023 05:09)  T(F): 97.9 (2023 09:27), Max: 98.8 (2023 05:09)  HR: 94 (2023 09:27) (87 - 99)  BP: 148/62 (2023 09:27) (122/67 - 148/62)  BP(mean): --  RR: 18 (2023 09:27) (18 - 18)  SpO2: 95% (2023 09:27) (92% - 95%)    Parameters below as of 2023 09:27  Patient On (Oxygen Delivery Method): room air    Gen: AOx3, NAD  CV: S1+S2 normal, no murmurs  Resp: Clear bilat, no resp distress  Abd: Soft, distended, nontender, +BS  Ext: No LE edema, no wounds  : No Acevedo  IV/Skin: No thrombophlebitis  Neuro: no focal deficits    LABS:                          9.1    6.87  )-----------( 242      ( 2023 07:15 )             27.7       04-06    133<L>  |  105  |  34<H>  ----------------------------<  89  3.3<L>   |  15<L>  |  1.23    Ca    7.9<L>      2023 07:15        Urinalysis Basic - ( 2023 13:04 )    Color: Yellow / Appearance: Clear / S.017 / pH: x  Gluc: x / Ketone: Negative  / Bili: Negative / Urobili: Negative   Blood: x / Protein: 30 mg/dL / Nitrite: Negative   Leuk Esterase: Negative / RBC: 4 /hpf / WBC 1 /HPF   Sq Epi: x / Non Sq Epi: 5 /hpf / Bacteria: Occasional        MICROBIOLOGY:  v  Clean Catch Clean Catch (Midstream)  23   <10,000 CFU/mL Normal Urogenital Ling  --  --      .Blood Blood-Venous  23   Growth in aerobic and anaerobic bottles: Escherichia coli  ***Blood Panel PCR results on this specimen are available  approximately 3 hours after the Gram stain result.***  Gram stain, PCR, and/or culture results may not always  correspond due to difference in methodologies.  ************************************************************  This PCR assay was performed by multiplex PCR. This  Assay tests for 66 bacterial and resistance gene targets.  Please refer to the [facility] Labs test directory  at [email or web address] for details.  --  Blood Culture PCR  Escherichia coli      .Blood Blood-Peripheral  23   Growth in aerobic and anaerobic bottles: Escherichia coli  See previous culture [id number]  --    Growth in aerobic bottle: Gram Negative Rods  Growth in anaerobic bottle: Gram Negative Rods    RADIOLOGY:    < from: Xray Abdomen 1 View PORTABLE -Routine (Xray Abdomen 1 View PORTABLE -Routine in AM.) (23 @ 07:07) >  IMPRESSION:  Enteric tube side port is within the esophagus.    Less dilatation of small bowel loops.      < end of copied text >

## 2023-04-06 NOTE — PROGRESS NOTE ADULT - ASSESSMENT
78 F w acute on chronic small and large bowel ileus, complicated by GNR bacteremia    1. 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 Now improved. Lactate decreased  - NGT removed  - rectal tube discontinued   - tolerating regular diet      2. GNR bacteremia. ?translocation   -cefepime/flagyl per ID    3. mild bilirubinemia          Attending supervision statement: I have personally seen and examined the patient. I fully participated in the care of this patient. I have made amendments to the documentation where necessary, and agree with the history, physical exam, and plan as outlined by the ACP.    [facility]  Patel Zepeda M.D.   [address], [place]  Office: [phone]

## 2023-04-06 NOTE — PROGRESS NOTE ADULT - PROBLEM SELECTOR PLAN 9
- Transition to lovenox 40 mg SQ daily for VTE PPx now that creatinine is normalized    D/C planning to home pending transition to PO antibiotics vs. short course of home IV infusion and diarrhea improving.    Patient interested in establishing care with a new PMD on discharge
- HSQ q12h for VTE PPx - if creatinine clearance continues to improve, will consider transition to lovenox injection    D/C planning to home pending transition to PO antibiotics and tolerating regular diet.

## 2023-04-06 NOTE — PROGRESS NOTE ADULT - SUBJECTIVE AND OBJECTIVE BOX
[facility] Division of Hospital Medicine  Immanuel Solorzano MD  Available via MS Teams  Pager: [phone]    SUBJECTIVE / OVERNIGHT EVENTS: Overnight, reports frequent bowel movements, reported as "diarrhea". States abdominal bloating persists, unchanged from prior. Feels she is adequately emptying her bladder. Denies chest pain. No fever/chills. No nausea/vomiting. Tolerating regular diet.    MEDICATIONS  (STANDING):  ceFAZolin   IVPB 1000 milliGRAM(s) IV Intermittent every 8 hours  famotidine    Tablet 20 milliGRAM(s) Oral <User Schedule>  heparin   Injectable 5000 Unit(s) SubCutaneous every 12 hours  pantoprazole    Tablet 40 milliGRAM(s) Oral before breakfast  simvastatin 10 milliGRAM(s) Oral at bedtime  sodium bicarbonate 650 milliGRAM(s) Oral two times a day    MEDICATIONS  (PRN):  acetaminophen     Tablet .. 650 milliGRAM(s) Oral every 6 hours PRN Temp greater or equal to 38C (100.4F), Mild Pain (1 - 3)  aluminum hydroxide/magnesium hydroxide/simethicone Suspension 30 milliLiter(s) Oral every 4 hours PRN Dyspepsia  melatonin 3 milliGRAM(s) Oral at bedtime PRN Insomnia  ondansetron Injectable 4 milliGRAM(s) IV Push every 8 hours PRN Nausea and/or Vomiting      I&O's Summary    2023 07:  -  2023 07:00  --------------------------------------------------------  IN: 320 mL / OUT: 400 mL / NET: -80 mL    2023 07:01  -  2023 11:39  --------------------------------------------------------  IN: 200 mL / OUT: 0 mL / NET: 200 mL        PHYSICAL EXAM:  Vital Signs Last 24 Hrs  T(C): 36.6 (2023 09:27), Max: 37.1 (2023 05:09)  T(F): 97.9 (2023 09:27), Max: 98.8 (2023 05:09)  HR: 94 (2023 09:27) (87 - 99)  BP: 148/62 (2023 09:27) (122/67 - 148/62)  BP(mean): --  RR: 18 (2023 09:27) (18 - 18)  SpO2: 95% (2023 09:27) (92% - 95%)    Parameters below as of 2023 09:27  Patient On (Oxygen Delivery Method): room air    CONSTITUTIONAL: NAD, well-developed, well-groomed  ENMT: Moist oral mucosa, no pharyngeal injection or exudates  NECK: Supple, no palpable masses; no thyromegaly  RESPIRATORY: Normal respiratory effort; lungs are clear to auscultation bilaterally  CARDIOVASCULAR: Regular rate and rhythm, normal S1 and S2, no murmur/rub/gallop; No lower extremity edema; Peripheral pulses are 2+ bilaterally  ABDOMEN: Nontender to palpation, distended, tympanic to percussion, non-tender to palpation diffusely. hypoactive bowel sounds, no rebound/guarding; No hepatosplenomegaly  MUSCULOSKELETAL:  No clubbing or cyanosis of digits; no joint swelling or tenderness to palpation  PSYCH: A+O to person, place, and time; affect appropriate  NEUROLOGY: CN 2-12 are intact and symmetric; no gross sensory deficits     LABS:                        9.1    6.87  )-----------( 242      ( 2023 07:15 )             27.7     04-06    133<L>  |  105  |  34<H>  ----------------------------<  89  3.3<L>   |  15<L>  |  1.23    Ca    7.9<L>      2023 07:15            Urinalysis Basic - ( 2023 13:04 )    Color: Yellow / Appearance: Clear / S.017 / pH: x  Gluc: x / Ketone: Negative  / Bili: Negative / Urobili: Negative   Blood: x / Protein: 30 mg/dL / Nitrite: Negative   Leuk Esterase: Negative / RBC: 4 /hpf / WBC 1 /HPF   Sq Epi: x / Non Sq Epi: 5 /hpf / Bacteria: Occasional        Culture - Urine (collected 2023 15:49)  Source: Clean Catch Clean Catch (Midstream)  Final Report (2023 20:14):    <10,000 CFU/mL Normal Urogenital Ling    Culture - Blood (collected 2023 12:55)  Source: .Blood Blood-Venous  Gram Stain (2023 04:20):    Growth in aerobic bottle: Gram Negative Rods    Growth in anaerobic bottle: Gram Negative Rods  Final Report (2023 14:53):    Growth in aerobic and anaerobic bottles: Escherichia coli    ***Blood Panel PCR results on this specimen are available    approximately 3 hours after the Gram stain result.***    Gram stain, PCR, and/or culture results may not always    correspond due to difference in methodologies.    ************************************************************    This PCR assay was performed by multiplex PCR. This    Assay tests for 66 bacterial and resistance gene targets.    Please refer to the [facility] Labs test directory    at [email or web address] for details.  Organism: Blood Culture PCR  Escherichia coli (2023 14:53)  Organism: Escherichia coli (2023 14:53)  Organism: Blood Culture PCR (2023 14:53)    Culture - Blood (collected 2023 12:40)  Source: .Blood Blood-Peripheral  Gram Stain (2023 04:21):    Growth in aerobic bottle: Gram Negative Rods    Growth in anaerobic bottle: Gram Negative Rods  Final Report (2023 15:49):    Growth in aerobic and anaerobic bottles: Escherichia coli    See previous culture [id number]      RADIOLOGY & ADDITIONAL TESTS:  New Imaging Personally Reviewed Today: No new studies    COORDINATION OF CARE:  Consultant Communication and Details of Discussion (where applicable): EDDIE Giraldo - regarding antibiotic regimen, plan for repeat BCx, and discharge antibiotic regimen

## 2023-04-06 NOTE — PROGRESS NOTE ADULT - ASSESSMENT
78 F  PMHx: HLP, GERD, Osteoporosis, SBP s/p ex-lap, breast Ca s/p lumpectomy, radiation    Presenting with abd pain   Afebrile, WBC WNL though with 37% bandemia  CT a/p with small and large bowel dilatation without obstructing mass, chronic intestinal pseudoobstruction, focal wall thickening in the R colon for which colonoscopy is recommended to exclude a mass lesion  Lactate improved  Elevated Cr  Elevated T bili improving  High grade E. coli bacteremia  PCN allergy was in childhood, no allergic reaction to keflex in the past, but states all oral abx except clinda upsets her stomach    Recommend:  #High grade E. coli bacteremia from chronic intestinal pseudoobstruction, possible mass in R colon  -Continue cefazolin 1 gram IV q 8 hours, anticipate 7 days total of abx  -Repeat blood cultures x 2 sets  -GI and Surgery following   -Monitor fever curve  -Trend WBC  -Bandemia improving  -Trend LFTs/ T bili – improving  -R colon mass workup per GI     #BLAKE  -Continue to monitor CrCl    #Diarrhea  -Continue to monitor  -If persists check C .diff    Minor Giraldo MD  Available through MS Teams  If no response, or after 5pm/weekends, call [phone]

## 2023-04-06 NOTE — PROGRESS NOTE ADULT - PROBLEM SELECTOR PLAN 8
- Resume prolia/vitamin D supplementation as outpatient once acute issues are resolved
- Resume prolia/vitamin D supplementation as outpatient once acute issues are resolved

## 2023-04-06 NOTE — PROGRESS NOTE ADULT - ASSESSMENT
77 yo F with pmh SBO s/p ex-lap, chronic intestinal pseudoobstruction, breast ca s/p lumpectomy + radiation therapy, p/w abdominal pain, found to have dilated small and large bowel secondary to pseudoobstruction, as well as lactic acidosis and gram negative doc bacteremia of presumed GI source

## 2023-04-07 ENCOUNTER — NON-APPOINTMENT (OUTPATIENT)
Age: 78
End: 2023-04-07

## 2023-04-07 LAB
ANION GAP SERPL CALC-SCNC: 13 MMOL/L — SIGNIFICANT CHANGE UP (ref 5–17)
BUN SERPL-MCNC: 28 MG/DL — HIGH (ref 7–23)
C DIFF GDH STL QL: NEGATIVE — SIGNIFICANT CHANGE UP
C DIFF GDH STL QL: SIGNIFICANT CHANGE UP
CALCIUM SERPL-MCNC: 7.8 MG/DL — LOW (ref 8.4–10.5)
CHLORIDE SERPL-SCNC: 103 MMOL/L — SIGNIFICANT CHANGE UP (ref 96–108)
CO2 SERPL-SCNC: 14 MMOL/L — LOW (ref 22–31)
CREAT SERPL-MCNC: 1.1 MG/DL — SIGNIFICANT CHANGE UP (ref 0.5–1.3)
EGFR: 51 ML/MIN/1.73M2 — LOW
GLUCOSE SERPL-MCNC: 100 MG/DL — HIGH (ref 70–99)
HCT VFR BLD CALC: 32.5 % — LOW (ref 34.5–45)
HGB BLD-MCNC: 10 G/DL — LOW (ref 11.5–15.5)
MCHC RBC-ENTMCNC: 28.9 PG — SIGNIFICANT CHANGE UP (ref 27–34)
MCHC RBC-ENTMCNC: 30.8 GM/DL — LOW (ref 32–36)
MCV RBC AUTO: 93.9 FL — SIGNIFICANT CHANGE UP (ref 80–100)
NRBC # BLD: 0 /100 WBCS — SIGNIFICANT CHANGE UP (ref 0–0)
PLATELET # BLD AUTO: 208 K/UL — SIGNIFICANT CHANGE UP (ref 150–400)
POTASSIUM SERPL-MCNC: 4 MMOL/L — SIGNIFICANT CHANGE UP (ref 3.5–5.3)
POTASSIUM SERPL-SCNC: 4 MMOL/L — SIGNIFICANT CHANGE UP (ref 3.5–5.3)
RBC # BLD: 3.46 M/UL — LOW (ref 3.8–5.2)
RBC # FLD: 15.7 % — HIGH (ref 10.3–14.5)
SODIUM SERPL-SCNC: 130 MMOL/L — LOW (ref 135–145)
WBC # BLD: 6.69 K/UL — SIGNIFICANT CHANGE UP (ref 3.8–10.5)
WBC # FLD AUTO: 6.69 K/UL — SIGNIFICANT CHANGE UP (ref 3.8–10.5)

## 2023-04-07 PROCEDURE — 99232 SBSQ HOSP IP/OBS MODERATE 35: CPT

## 2023-04-07 RX ORDER — SODIUM CHLORIDE 9 MG/ML
1000 INJECTION, SOLUTION INTRAVENOUS
Refills: 0 | Status: DISCONTINUED | OUTPATIENT
Start: 2023-04-07 | End: 2023-04-08

## 2023-04-07 RX ADMIN — Medication 650 MILLIGRAM(S): at 05:28

## 2023-04-07 RX ADMIN — PANTOPRAZOLE SODIUM 40 MILLIGRAM(S): 20 TABLET, DELAYED RELEASE ORAL at 05:28

## 2023-04-07 RX ADMIN — Medication 100 MILLIGRAM(S): at 05:30

## 2023-04-07 RX ADMIN — Medication 100 MILLIGRAM(S): at 21:14

## 2023-04-07 RX ADMIN — SODIUM CHLORIDE 75 MILLILITER(S): 9 INJECTION, SOLUTION INTRAVENOUS at 13:51

## 2023-04-07 RX ADMIN — ENOXAPARIN SODIUM 40 MILLIGRAM(S): 100 INJECTION SUBCUTANEOUS at 19:00

## 2023-04-07 RX ADMIN — Medication 100 MILLIGRAM(S): at 13:51

## 2023-04-07 RX ADMIN — SIMVASTATIN 10 MILLIGRAM(S): 20 TABLET, FILM COATED ORAL at 21:14

## 2023-04-07 RX ADMIN — FAMOTIDINE 20 MILLIGRAM(S): 10 INJECTION INTRAVENOUS at 21:14

## 2023-04-07 NOTE — PROGRESS NOTE ADULT - SUBJECTIVE AND OBJECTIVE BOX
CC: Patient is a 78y old  Female who presents with a chief complaint of abdominal pain (07 Apr 2023 11:47)    ID following for E. coli bacteremia    Interval History/ROS: Patient states had 4 episodes of watery diarrhea this morning. No fevers.     Rest of ROS negative.    Allergies  penicillins (Unknown)  sulfa drugs (Hives)    ANTIMICROBIALS:  ceFAZolin   IVPB 1000 every 8 hours    OTHER MEDS:  acetaminophen     Tablet .. 650 milliGRAM(s) Oral every 6 hours PRN  aluminum hydroxide/magnesium hydroxide/simethicone Suspension 30 milliLiter(s) Oral every 4 hours PRN  enoxaparin Injectable 40 milliGRAM(s) SubCutaneous every 24 hours  famotidine    Tablet 20 milliGRAM(s) Oral <User Schedule>  melatonin 3 milliGRAM(s) Oral at bedtime PRN  ondansetron Injectable 4 milliGRAM(s) IV Push every 8 hours PRN  pantoprazole    Tablet 40 milliGRAM(s) Oral before breakfast  simvastatin 10 milliGRAM(s) Oral at bedtime  sodium bicarbonate 650 milliGRAM(s) Oral two times a day    PE:    Vital Signs Last 24 Hrs  T(C): 36.9 (07 Apr 2023 04:53), Max: 37.2 (06 Apr 2023 20:04)  T(F): 98.4 (07 Apr 2023 04:53), Max: 98.9 (06 Apr 2023 20:04)  HR: 95 (07 Apr 2023 04:53) (88 - 98)  BP: 127/70 (07 Apr 2023 04:53) (124/72 - 143/60)  BP(mean): --  RR: 18 (07 Apr 2023 04:53) (18 - 18)  SpO2: 94% (07 Apr 2023 04:53) (92% - 97%)    Parameters below as of 07 Apr 2023 04:53  Patient On (Oxygen Delivery Method): room air    Gen: AOx3, NAD  CV: S1+S2 normal, no murmurs  Resp: Clear bilat, no resp distress  Abd: Soft, distended, nontender, +BS  Ext: No LE edema, no wounds  : No Acevedo  IV/Skin: No thrombophlebitis  Neuro: no focal deficits    LABS:                          10.0   6.69  )-----------( 208      ( 07 Apr 2023 07:30 )             32.5       04-07    130<L>  |  103  |  28<H>  ----------------------------<  100<H>  4.0   |  14<L>  |  1.10    Ca    7.8<L>      07 Apr 2023 07:29            MICROBIOLOGY:  v  Clean Catch Clean Catch (Midstream)  04-03-23   <10,000 CFU/mL Normal Urogenital Ling  --  --      .Blood Blood-Venous  04-03-23   Growth in aerobic and anaerobic bottles: Escherichia coli  ***Blood Panel PCR results on this specimen are available  approximately 3 hours after the Gram stain result.***  Gram stain, PCR, and/or culture results may not always  correspond due to difference in methodologies.  ************************************************************  This PCR assay was performed by multiplex PCR. This  Assay tests for 66 bacterial and resistance gene targets.  Please refer to the [facility] Labs test directory  at [email or web address] for details.  --  Blood Culture PCR  Escherichia coli      .Blood Blood-Peripheral  04-03-23   Growth in aerobic and anaerobic bottles: Escherichia coli  See previous culture [id number]  --    Growth in aerobic bottle: Gram Negative Rods  Growth in anaerobic bottle: Gram Negative Rods    RADIOLOGY:    < from: Xray Abdomen 1 View PORTABLE -Routine (Xray Abdomen 1 View PORTABLE -Routine in AM.) (04.04.23 @ 07:07) >  IMPRESSION:  Enteric tube side port is within the esophagus.    Less dilatation of small bowel loops.      < end of copied text >

## 2023-04-07 NOTE — PROGRESS NOTE ADULT - ASSESSMENT
78 F w acute on chronic small and large bowel ileus, complicated by GNR bacteremia    1. 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 Now improved. Lactate decreased  - NGT removed  - rectal tube discontinued   - having soft/loose BMs, likely from abx   - tolerating regular diet    - outpatient GI follow up with her GI Dr. Gordon,   consider outpatient colonoscopy for focal wall thickening in right colon, r/o mass/ lesion    2. GNR bacteremia. ?translocation   -cefepime/flagyl per ID    3. mild bilirubinemia          Attending supervision statement: I have personally seen and examined the patient. I fully participated in the care of this patient. I have made amendments to the documentation where necessary, and agree with the history, physical exam, and plan as outlined by the ACP.    [facility]  Patel Zepeda M.D.   [address], [place]  Office: [phone]

## 2023-04-07 NOTE — PROGRESS NOTE ADULT - ASSESSMENT
78 F  PMHx: HLP, GERD, Osteoporosis, SBP s/p ex-lap, breast Ca s/p lumpectomy, radiation    Presenting with abd pain   Afebrile, WBC WNL though with 37% bandemia  CT a/p with small and large bowel dilatation without obstructing mass, chronic intestinal pseudoobstruction, focal wall thickening in the R colon for which colonoscopy is recommended to exclude a mass lesion  Lactate improved  BLAKE resolved  Elevated T bili improving  High grade E. coli bacteremia  PCN allergy was in childhood, no allergic reaction to keflex in the past, but states all oral abx except clinda upsets her stomach  Watery Diarrhea    Recommend:  #High grade E. coli bacteremia from chronic intestinal pseudoobstruction, possible mass in R colon  -Continue cefazolin 1 gram IV q 8 hours, anticipate 7 days total of abx to complete 4/10  -F/U repeat blood cultures in lab  -GI and Surgery following   -Monitor fever curve  -Trend WBC  -Bandemia improving  -Trend LFTs/ T bili – improving  -R colon mass workup per GI     #BLAKE  -Resolved  -Continue to monitor CrCl    #Diarrhea  -Check C .diff    Minor Giraldo MD  Available through MS Teams  If no response, or after 5pm/weekends, call [phone]

## 2023-04-07 NOTE — PROGRESS NOTE ADULT - SUBJECTIVE AND OBJECTIVE BOX
Chief Complaint:  Patient is a 78y old  Female who presents with a chief complaint of abdominal pain (07 Apr 2023 12:12)      Date of service 04-07-23 @ 12:41      Interval Events:   Patient seen and examined.   No abdominal pain. Tolerating diet.  Having soft/ loose BMs    Hospital Medications:  acetaminophen     Tablet .. 650 milliGRAM(s) Oral every 6 hours PRN  aluminum hydroxide/magnesium hydroxide/simethicone Suspension 30 milliLiter(s) Oral every 4 hours PRN  ceFAZolin   IVPB 1000 milliGRAM(s) IV Intermittent every 8 hours  enoxaparin Injectable 40 milliGRAM(s) SubCutaneous every 24 hours  famotidine    Tablet 20 milliGRAM(s) Oral <User Schedule>  melatonin 3 milliGRAM(s) Oral at bedtime PRN  ondansetron Injectable 4 milliGRAM(s) IV Push every 8 hours PRN  pantoprazole    Tablet 40 milliGRAM(s) Oral before breakfast  simvastatin 10 milliGRAM(s) Oral at bedtime  sodium chloride 0.45% 1000 milliLiter(s) IV Continuous <Continuous>        Review of Systems:  General:  No wt loss, fevers, chills, night sweats, fatigue,   Eyes:  Good vision, no reported pain  ENT:  No sore throat, pain, runny nose, dysphagia  CV:  No pain, palpitations, hypo/hypertension  Resp:  No dyspnea, cough, tachypnea, wheezing  GI:  See HPI  :  No pain, bleeding, incontinence, nocturia  Muscle:  No pain, weakness  Neuro:  No weakness, tingling, memory problems  Psych:  No fatigue, insomnia, mood problems, depression  Endocrine:  No polyuria, polydipsia, cold/heat intolerance  Heme:  No petechiae, ecchymosis, easy bruisability  Integumentary:  No rash, edema    PHYSICAL EXAM:   Vital Signs:  Vital Signs Last 24 Hrs  T(C): 36.9 (07 Apr 2023 04:53), Max: 37.2 (06 Apr 2023 20:04)  T(F): 98.4 (07 Apr 2023 04:53), Max: 98.9 (06 Apr 2023 20:04)  HR: 95 (07 Apr 2023 04:53) (88 - 98)  BP: 127/70 (07 Apr 2023 04:53) (124/72 - 143/60)  BP(mean): --  RR: 18 (07 Apr 2023 04:53) (18 - 18)  SpO2: 94% (07 Apr 2023 04:53) (92% - 97%)    Parameters below as of 07 Apr 2023 04:53  Patient On (Oxygen Delivery Method): room air      Daily     Daily       PHYSICAL EXAM:     GENERAL:  Appears stated age, well-groomed, well-nourished, no distress  HEENT:  NC/AT,  conjunctivae anicteric, clear and pink,   NECK: supple, trachea midline  CHEST:  Full & symmetric excursion, no increased effort, breath sounds clear  HEART:  Regular rhythm, no JVD  ABDOMEN:  Soft, non-tender, non-distended, normoactive bowel sounds,  no masses , no hepatosplenomegaly  EXTREMITIES:  no cyanosis,clubbing or edema  SKIN:  No rash, erythema, or, ecchymoses, no jaundice  NEURO:  Alert, non-focal, no asterixis  PSYCH: Appropriate affect, oriented to place and time  RECTAL: Deferred      LABS Personally reviewed by me:                        10.0   6.69  )-----------( 208      ( 07 Apr 2023 07:30 )             32.5     Mean Cell Volume: 93.9 fl (04-07-23 @ 07:30)    04-07    130<L>  |  103  |  28<H>  ----------------------------<  100<H>  4.0   |  14<L>  |  1.10    Ca    7.8<L>      07 Apr 2023 07:29                                    10.0   6.69  )-----------( 208      ( 07 Apr 2023 07:30 )             32.5                         9.1    6.87  )-----------( 242      ( 06 Apr 2023 07:15 )             27.7                         9.2    5.63  )-----------( 209      ( 05 Apr 2023 07:22 )             27.7       Imaging personally reviewed by me:

## 2023-04-07 NOTE — PROGRESS NOTE ADULT - SUBJECTIVE AND OBJECTIVE BOX
[facility] Division of Hospital Medicine  Immanuel Solorzano MD  Available via MS Teams  Pager: [phone]    SUBJECTIVE / OVERNIGHT EVENTS: Reports persistent diarrhea, watery, every 30-40 minutes overnight. No blood per rectum. Feels sensation of abdominal bloating, which is then relieved by the diarrhea. Abdominal pain is less. No fever/chills. No nausea/vomiting. Tolerating diet.    MEDICATIONS  (STANDING):  ceFAZolin   IVPB 1000 milliGRAM(s) IV Intermittent every 8 hours  enoxaparin Injectable 40 milliGRAM(s) SubCutaneous every 24 hours  famotidine    Tablet 20 milliGRAM(s) Oral <User Schedule>  pantoprazole    Tablet 40 milliGRAM(s) Oral before breakfast  simvastatin 10 milliGRAM(s) Oral at bedtime  sodium bicarbonate 650 milliGRAM(s) Oral two times a day    MEDICATIONS  (PRN):  acetaminophen     Tablet .. 650 milliGRAM(s) Oral every 6 hours PRN Temp greater or equal to 38C (100.4F), Mild Pain (1 - 3)  aluminum hydroxide/magnesium hydroxide/simethicone Suspension 30 milliLiter(s) Oral every 4 hours PRN Dyspepsia  melatonin 3 milliGRAM(s) Oral at bedtime PRN Insomnia  ondansetron Injectable 4 milliGRAM(s) IV Push every 8 hours PRN Nausea and/or Vomiting      I&O's Summary    06 Apr 2023 07:01  -  07 Apr 2023 07:00  --------------------------------------------------------  IN: 680 mL / OUT: 800 mL / NET: -120 mL        PHYSICAL EXAM:  Vital Signs Last 24 Hrs  T(C): 36.9 (07 Apr 2023 04:53), Max: 37.2 (06 Apr 2023 20:04)  T(F): 98.4 (07 Apr 2023 04:53), Max: 98.9 (06 Apr 2023 20:04)  HR: 95 (07 Apr 2023 04:53) (88 - 98)  BP: 127/70 (07 Apr 2023 04:53) (124/72 - 143/60)  BP(mean): --  RR: 18 (07 Apr 2023 04:53) (18 - 18)  SpO2: 94% (07 Apr 2023 04:53) (92% - 97%)    Parameters below as of 07 Apr 2023 04:53  Patient On (Oxygen Delivery Method): room air    CONSTITUTIONAL: NAD, well-developed, well-groomed  ENMT: Moist oral mucosa, no pharyngeal injection or exudates  NECK: Supple, no palpable masses; no thyromegaly  RESPIRATORY: Normal respiratory effort; lungs are clear to auscultation bilaterally  CARDIOVASCULAR: Regular rate and rhythm, normal S1 and S2, no murmur/rub/gallop; No lower extremity edema; Peripheral pulses are 2+ bilaterally  ABDOMEN: Nontender to palpation, distended but improved, tympanic to percussion, non-tender to palpation diffusely. hypoactive bowel sounds, no rebound/guarding; No hepatosplenomegaly  MUSCULOSKELETAL:  No clubbing or cyanosis of digits; no joint swelling or tenderness to palpation  PSYCH: A+O to person, place, and time; affect appropriate  NEUROLOGY: CN 2-12 are intact and symmetric; no gross sensory deficits     LABS:                        10.0   6.69  )-----------( 208      ( 07 Apr 2023 07:30 )             32.5     04-07    130<L>  |  103  |  28<H>  ----------------------------<  100<H>  4.0   |  14<L>  |  1.10    Ca    7.8<L>      07 Apr 2023 07:29

## 2023-04-08 LAB
ALBUMIN SERPL ELPH-MCNC: 2.5 G/DL — LOW (ref 3.3–5)
ALP SERPL-CCNC: 63 U/L — SIGNIFICANT CHANGE UP (ref 40–120)
ALT FLD-CCNC: 13 U/L — SIGNIFICANT CHANGE UP (ref 10–45)
ANION GAP SERPL CALC-SCNC: 12 MMOL/L — SIGNIFICANT CHANGE UP (ref 5–17)
ANION GAP SERPL CALC-SCNC: 9 MMOL/L — SIGNIFICANT CHANGE UP (ref 5–17)
AST SERPL-CCNC: 25 U/L — SIGNIFICANT CHANGE UP (ref 10–40)
BILIRUB SERPL-MCNC: 0.8 MG/DL — SIGNIFICANT CHANGE UP (ref 0.2–1.2)
BUN SERPL-MCNC: 18 MG/DL — SIGNIFICANT CHANGE UP (ref 7–23)
BUN SERPL-MCNC: 22 MG/DL — SIGNIFICANT CHANGE UP (ref 7–23)
CALCIUM SERPL-MCNC: 7.4 MG/DL — LOW (ref 8.4–10.5)
CALCIUM SERPL-MCNC: 7.7 MG/DL — LOW (ref 8.4–10.5)
CHLORIDE SERPL-SCNC: 101 MMOL/L — SIGNIFICANT CHANGE UP (ref 96–108)
CHLORIDE SERPL-SCNC: 102 MMOL/L — SIGNIFICANT CHANGE UP (ref 96–108)
CO2 SERPL-SCNC: 21 MMOL/L — LOW (ref 22–31)
CO2 SERPL-SCNC: 25 MMOL/L — SIGNIFICANT CHANGE UP (ref 22–31)
CREAT SERPL-MCNC: 1.05 MG/DL — SIGNIFICANT CHANGE UP (ref 0.5–1.3)
CREAT SERPL-MCNC: 1.09 MG/DL — SIGNIFICANT CHANGE UP (ref 0.5–1.3)
EGFR: 52 ML/MIN/1.73M2 — LOW
EGFR: 54 ML/MIN/1.73M2 — LOW
GLUCOSE SERPL-MCNC: 119 MG/DL — HIGH (ref 70–99)
GLUCOSE SERPL-MCNC: 149 MG/DL — HIGH (ref 70–99)
HCT VFR BLD CALC: 28.3 % — LOW (ref 34.5–45)
HGB BLD-MCNC: 9.3 G/DL — LOW (ref 11.5–15.5)
MAGNESIUM SERPL-MCNC: 1.7 MG/DL — SIGNIFICANT CHANGE UP (ref 1.6–2.6)
MAGNESIUM SERPL-MCNC: 1.8 MG/DL — SIGNIFICANT CHANGE UP (ref 1.6–2.6)
MCHC RBC-ENTMCNC: 28.1 PG — SIGNIFICANT CHANGE UP (ref 27–34)
MCHC RBC-ENTMCNC: 32.9 GM/DL — SIGNIFICANT CHANGE UP (ref 32–36)
MCV RBC AUTO: 85.5 FL — SIGNIFICANT CHANGE UP (ref 80–100)
NRBC # BLD: 0 /100 WBCS — SIGNIFICANT CHANGE UP (ref 0–0)
PHOSPHATE SERPL-MCNC: 1.2 MG/DL — LOW (ref 2.5–4.5)
PLATELET # BLD AUTO: 246 K/UL — SIGNIFICANT CHANGE UP (ref 150–400)
POTASSIUM SERPL-MCNC: 3 MMOL/L — LOW (ref 3.5–5.3)
POTASSIUM SERPL-MCNC: 3.8 MMOL/L — SIGNIFICANT CHANGE UP (ref 3.5–5.3)
POTASSIUM SERPL-SCNC: 3 MMOL/L — LOW (ref 3.5–5.3)
POTASSIUM SERPL-SCNC: 3.8 MMOL/L — SIGNIFICANT CHANGE UP (ref 3.5–5.3)
PROT SERPL-MCNC: 5.3 G/DL — LOW (ref 6–8.3)
RBC # BLD: 3.31 M/UL — LOW (ref 3.8–5.2)
RBC # FLD: 14.9 % — HIGH (ref 10.3–14.5)
SODIUM SERPL-SCNC: 135 MMOL/L — SIGNIFICANT CHANGE UP (ref 135–145)
SODIUM SERPL-SCNC: 135 MMOL/L — SIGNIFICANT CHANGE UP (ref 135–145)
WBC # BLD: 9.07 K/UL — SIGNIFICANT CHANGE UP (ref 3.8–10.5)
WBC # FLD AUTO: 9.07 K/UL — SIGNIFICANT CHANGE UP (ref 3.8–10.5)

## 2023-04-08 PROCEDURE — 99233 SBSQ HOSP IP/OBS HIGH 50: CPT

## 2023-04-08 RX ORDER — SODIUM,POTASSIUM PHOSPHATES 278-250MG
1 POWDER IN PACKET (EA) ORAL
Refills: 0 | Status: COMPLETED | OUTPATIENT
Start: 2023-04-08 | End: 2023-04-09

## 2023-04-08 RX ORDER — POTASSIUM CHLORIDE 20 MEQ
40 PACKET (EA) ORAL EVERY 4 HOURS
Refills: 0 | Status: COMPLETED | OUTPATIENT
Start: 2023-04-08 | End: 2023-04-08

## 2023-04-08 RX ORDER — LOPERAMIDE HCL 2 MG
2 TABLET ORAL EVERY 6 HOURS
Refills: 0 | Status: DISCONTINUED | OUTPATIENT
Start: 2023-04-08 | End: 2023-04-09

## 2023-04-08 RX ORDER — MAGNESIUM SULFATE 500 MG/ML
1 VIAL (ML) INJECTION ONCE
Refills: 0 | Status: COMPLETED | OUTPATIENT
Start: 2023-04-08 | End: 2023-04-08

## 2023-04-08 RX ORDER — LOPERAMIDE HCL 2 MG
4 TABLET ORAL ONCE
Refills: 0 | Status: COMPLETED | OUTPATIENT
Start: 2023-04-08 | End: 2023-04-08

## 2023-04-08 RX ADMIN — Medication 40 MILLIEQUIVALENT(S): at 15:37

## 2023-04-08 RX ADMIN — Medication 85 MILLIMOLE(S): at 18:52

## 2023-04-08 RX ADMIN — SODIUM CHLORIDE 75 MILLILITER(S): 9 INJECTION, SOLUTION INTRAVENOUS at 16:20

## 2023-04-08 RX ADMIN — ENOXAPARIN SODIUM 40 MILLIGRAM(S): 100 INJECTION SUBCUTANEOUS at 18:11

## 2023-04-08 RX ADMIN — Medication 100 MILLIGRAM(S): at 21:34

## 2023-04-08 RX ADMIN — Medication 1 PACKET(S): at 18:11

## 2023-04-08 RX ADMIN — Medication 100 GRAM(S): at 18:08

## 2023-04-08 RX ADMIN — Medication 40 MILLIEQUIVALENT(S): at 11:25

## 2023-04-08 RX ADMIN — Medication 100 MILLIGRAM(S): at 14:59

## 2023-04-08 RX ADMIN — Medication 100 MILLIGRAM(S): at 05:58

## 2023-04-08 RX ADMIN — Medication 2 MILLIGRAM(S): at 18:11

## 2023-04-08 RX ADMIN — PANTOPRAZOLE SODIUM 40 MILLIGRAM(S): 20 TABLET, DELAYED RELEASE ORAL at 05:59

## 2023-04-08 RX ADMIN — Medication 40 MILLIEQUIVALENT(S): at 08:27

## 2023-04-08 RX ADMIN — Medication 4 MILLIGRAM(S): at 12:26

## 2023-04-08 RX ADMIN — SIMVASTATIN 10 MILLIGRAM(S): 20 TABLET, FILM COATED ORAL at 21:34

## 2023-04-08 NOTE — PROGRESS NOTE ADULT - ASSESSMENT
78 F w acute on chronic small and large bowel ileus, complicated by GNR bacteremia    1. 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 Now improved. Lactate decreased  - NGT removed  - rectal tube discontinued   - having soft/loose BMs, likely from abx   - tolerating regular diet    - outpatient GI follow up with her GI Dr. Gordon,   consider outpatient colonoscopy for focal wall thickening in right colon, r/o mass/ lesion    2. GNR bacteremia. ?translocation   -cefepime/flagyl per ID    3. mild bilirubinemia          Attending supervision statement: I have personally seen and examined the patient. I fully participated in the care of this patient. I have made amendments to the documentation where necessary, and agree with the history, physical exam, and plan as outlined by the ACP.    [facility]  Patel Zepeda M.D.   [address], [place]  Office: [phone]

## 2023-04-08 NOTE — PROGRESS NOTE ADULT - SUBJECTIVE AND OBJECTIVE BOX
Chief Complaint:  Patient is a 78y old  Female who presents with a chief complaint of abdominal pain.      Date of service 04-08      Interval Events:     pt seen and examined, no complaints, ROS - .         acetaminophen     Tablet .. 650 milliGRAM(s) Oral every 6 hours PRN  aluminum hydroxide/magnesium hydroxide/simethicone Suspension 30 milliLiter(s) Oral every 4 hours PRN  ceFAZolin   IVPB 1000 milliGRAM(s) IV Intermittent every 8 hours  enoxaparin Injectable 40 milliGRAM(s) SubCutaneous every 24 hours  famotidine    Tablet 20 milliGRAM(s) Oral <User Schedule>  melatonin 3 milliGRAM(s) Oral at bedtime PRN  ondansetron Injectable 4 milliGRAM(s) IV Push every 8 hours PRN  pantoprazole    Tablet 40 milliGRAM(s) Oral before breakfast  simvastatin 10 milliGRAM(s) Oral at bedtime  sodium chloride 0.45% 1000 milliLiter(s) IV Continuous <Continuous>                            9.3    9.07  )-----------( 246      ( 08 Apr 2023 06:55 )             28.3       Hemoglobin: 9.3 g/dL (04-08 @ 06:55)  Hemoglobin: 10.0 g/dL (04-07 @ 07:30)  Hemoglobin: 9.1 g/dL (04-06 @ 07:15)  Hemoglobin: 9.2 g/dL (04-05 @ 07:22)  Hemoglobin: 10.6 g/dL (04-04 @ 07:07)      04-08    135  |  102  |  22  ----------------------------<  119<H>  3.0<L>   |  21<L>  |  1.05    Ca    7.7<L>      08 Apr 2023 06:48  Mg     1.8     04-08    TPro  5.3<L>  /  Alb  2.5<L>  /  TBili  0.8  /  DBili  x   /  AST  25  /  ALT  13  /  AlkPhos  63  04-08    Creatinine Trend: 1.05<--, 1.10<--, 1.23<--, 1.53<--, 2.06<--, 1.82<--    COAGS:           T(C): 37.4 (04-08-23 @ 04:40), Max: 37.4 (04-07-23 @ 20:55)  HR: 96 (04-08-23 @ 04:40) (94 - 101)  BP: 124/66 (04-08-23 @ 04:40) (124/66 - 155/71)  RR: 18 (04-08-23 @ 04:40) (18 - 18)  SpO2: 94% (04-08-23 @ 04:40) (93% - 96%)  Wt(kg): --    I&O's Summary    07 Apr 2023 07:01  -  08 Apr 2023 07:00  --------------------------------------------------------  IN: 1920 mL / OUT: 801 mL / NET: 1119 mL        Review of Systems:  General:  No wt loss, fevers, chills, night sweats, fatigue,   Eyes:  Good vision, no reported pain  ENT:  No sore throat, pain, runny nose, dysphagia  CV:  No pain, palpitations, hypo/hypertension  Resp:  No dyspnea, cough, tachypnea, wheezing  GI:  See HPI  :  No pain, bleeding, incontinence, nocturia  Muscle:  No pain, weakness  Neuro:  No weakness, tingling, memory problems  Psych:  No fatigue, insomnia, mood problems, depression  Endocrine:  No polyuria, polydipsia, cold/heat intolerance  Heme:  No petechiae, ecchymosis, easy bruisability  Integumentary:  No rash, edema            PHYSICAL EXAM:     GENERAL:  Appears stated age, well-groomed, well-nourished, no distress  HEENT:  NC/AT,  conjunctivae anicteric, clear and pink,   NECK: supple, trachea midline  CHEST:  Full & symmetric excursion, no increased effort, breath sounds clear  HEART:  Regular rhythm, no JVD  ABDOMEN:  Soft, non-tender, non-distended, normoactive bowel sounds,  no masses , no hepatosplenomegaly  EXTREMITIES:  no cyanosis,clubbing or edema  SKIN:  No rash, erythema, or, ecchymoses, no jaundice  NEURO:  Alert, non-focal, no asterixis  PSYCH: Appropriate affect, oriented to place and time  RECTAL: Deferred

## 2023-04-08 NOTE — PROGRESS NOTE ADULT - SUBJECTIVE AND OBJECTIVE BOX
[facility] Division of Hospital Medicine  Sy Anderson MD  Available via MS Teams  Pager: [phone]    SUBJECTIVE / OVERNIGHT EVENTS:  - this AM, patient states she still feels weak and fatigued. denies any nausea or vomiting, but still endorses diarrhea and more loose stools than watery diarrhea. denies any shortness of breath, cough.     ADDITIONAL REVIEW OF SYSTEMS:    MEDICATIONS  (STANDING):  ceFAZolin   IVPB 1000 milliGRAM(s) IV Intermittent every 8 hours  enoxaparin Injectable 40 milliGRAM(s) SubCutaneous every 24 hours  famotidine    Tablet 20 milliGRAM(s) Oral <User Schedule>  loperamide 2 milliGRAM(s) Oral every 6 hours  pantoprazole    Tablet 40 milliGRAM(s) Oral before breakfast  potassium chloride    Tablet ER 40 milliEquivalent(s) Oral every 4 hours  simvastatin 10 milliGRAM(s) Oral at bedtime  sodium chloride 0.45% 1000 milliLiter(s) (75 mL/Hr) IV Continuous <Continuous>    MEDICATIONS  (PRN):  acetaminophen     Tablet .. 650 milliGRAM(s) Oral every 6 hours PRN Temp greater or equal to 38C (100.4F), Mild Pain (1 - 3)  aluminum hydroxide/magnesium hydroxide/simethicone Suspension 30 milliLiter(s) Oral every 4 hours PRN Dyspepsia  melatonin 3 milliGRAM(s) Oral at bedtime PRN Insomnia  ondansetron Injectable 4 milliGRAM(s) IV Push every 8 hours PRN Nausea and/or Vomiting      I&O's Summary    07 Apr 2023 07:01  -  08 Apr 2023 07:00  --------------------------------------------------------  IN: 1920 mL / OUT: 801 mL / NET: 1119 mL    08 Apr 2023 07:01  -  08 Apr 2023 13:54  --------------------------------------------------------  IN: 320 mL / OUT: 300 mL / NET: 20 mL        PHYSICAL EXAM:  Vital Signs Last 24 Hrs  T(C): 36.9 (08 Apr 2023 13:32), Max: 37.4 (07 Apr 2023 20:55)  T(F): 98.4 (08 Apr 2023 13:32), Max: 99.4 (08 Apr 2023 04:40)  HR: 100 (08 Apr 2023 13:32) (94 - 100)  BP: 126/53 (08 Apr 2023 13:32) (124/66 - 150/62)  BP(mean): --  RR: 18 (08 Apr 2023 13:32) (18 - 18)  SpO2: 93% (08 Apr 2023 13:32) (93% - 96%)    Parameters below as of 08 Apr 2023 13:32  Patient On (Oxygen Delivery Method): room air      CONSTITUTIONAL: NAD, well-developed, well-groomed  ENMT: Moist oral mucosa, no pharyngeal injection or exudates  NECK: Supple, no palpable masses;  RESPIRATORY: Normal respiratory effort; lungs are clear to auscultation bilaterally  CARDIOVASCULAR: Regular rate and rhythm, normal S1 and S2, no murmur/rub/gallop; No lower extremity edema; Peripheral pulses are 2+ bilaterally  ABDOMEN: Nontender to palpation, distended abdomen, tympanic to percussion, non-tender to palpation diffusely. hypoactive bowel sounds, no rebound/guarding; No hepatosplenomegaly  MUSCULOSKELETAL:  No clubbing or cyanosis of digits; no joint swelling or tenderness to palpation  PSYCH: A+O to person, place, and time; affect appropriate  NEUROLOGY: CN 2-12 are intact and symmetric; no gross sensory deficits       LABS:                        9.3    9.07  )-----------( 246      ( 08 Apr 2023 06:55 )             28.3     04-08    135  |  102  |  22  ----------------------------<  119<H>  3.0<L>   |  21<L>  |  1.05    Ca    7.7<L>      08 Apr 2023 06:48  Mg     1.8     04-08    TPro  5.3<L>  /  Alb  2.5<L>  /  TBili  0.8  /  DBili  x   /  AST  25  /  ALT  13  /  AlkPhos  63  04-08              Culture - Blood (collected 06 Apr 2023 11:50)  Source: .Blood Blood-Peripheral  Preliminary Report (07 Apr 2023 18:01):    No growth to date.    Culture - Blood (collected 06 Apr 2023 11:50)  Source: .Blood Blood-Arterial  Preliminary Report (07 Apr 2023 18:01):    No growth to date.          RADIOLOGY & ADDITIONAL TESTS:  New Results Reviewed Today: cbc cmp  New Imaging Personally Reviewed Today: n/a  New Electrocardiogram Personally Reviewed Today: n/a  Prior or Outpatient Records Reviewed Today: n/a     COMMUNICATION:  Care Discussed with Consultants/Other Providers and Details of Discussion: n/a   Discussions with Patient/Family: n/a  PCP Communication: n/a

## 2023-04-09 LAB
ANION GAP SERPL CALC-SCNC: 9 MMOL/L — SIGNIFICANT CHANGE UP (ref 5–17)
BUN SERPL-MCNC: 16 MG/DL — SIGNIFICANT CHANGE UP (ref 7–23)
CALCIUM SERPL-MCNC: 7.6 MG/DL — LOW (ref 8.4–10.5)
CHLORIDE SERPL-SCNC: 102 MMOL/L — SIGNIFICANT CHANGE UP (ref 96–108)
CO2 SERPL-SCNC: 23 MMOL/L — SIGNIFICANT CHANGE UP (ref 22–31)
CREAT SERPL-MCNC: 1.03 MG/DL — SIGNIFICANT CHANGE UP (ref 0.5–1.3)
EGFR: 55 ML/MIN/1.73M2 — LOW
GLUCOSE SERPL-MCNC: 121 MG/DL — HIGH (ref 70–99)
HCT VFR BLD CALC: 26.4 % — LOW (ref 34.5–45)
HGB BLD-MCNC: 8.8 G/DL — LOW (ref 11.5–15.5)
MAGNESIUM SERPL-MCNC: 1.8 MG/DL — SIGNIFICANT CHANGE UP (ref 1.6–2.6)
MCHC RBC-ENTMCNC: 28.9 PG — SIGNIFICANT CHANGE UP (ref 27–34)
MCHC RBC-ENTMCNC: 33.3 GM/DL — SIGNIFICANT CHANGE UP (ref 32–36)
MCV RBC AUTO: 86.8 FL — SIGNIFICANT CHANGE UP (ref 80–100)
NRBC # BLD: 0 /100 WBCS — SIGNIFICANT CHANGE UP (ref 0–0)
PHOSPHATE SERPL-MCNC: 2.7 MG/DL — SIGNIFICANT CHANGE UP (ref 2.5–4.5)
PLATELET # BLD AUTO: 211 K/UL — SIGNIFICANT CHANGE UP (ref 150–400)
POTASSIUM SERPL-MCNC: 3.7 MMOL/L — SIGNIFICANT CHANGE UP (ref 3.5–5.3)
POTASSIUM SERPL-SCNC: 3.7 MMOL/L — SIGNIFICANT CHANGE UP (ref 3.5–5.3)
RBC # BLD: 3.04 M/UL — LOW (ref 3.8–5.2)
RBC # FLD: 14.9 % — HIGH (ref 10.3–14.5)
SODIUM SERPL-SCNC: 134 MMOL/L — LOW (ref 135–145)
WBC # BLD: 7.55 K/UL — SIGNIFICANT CHANGE UP (ref 3.8–10.5)
WBC # FLD AUTO: 7.55 K/UL — SIGNIFICANT CHANGE UP (ref 3.8–10.5)

## 2023-04-09 PROCEDURE — 99233 SBSQ HOSP IP/OBS HIGH 50: CPT

## 2023-04-09 RX ORDER — METOCLOPRAMIDE HCL 10 MG
5 TABLET ORAL DAILY
Refills: 0 | Status: DISCONTINUED | OUTPATIENT
Start: 2023-04-09 | End: 2023-04-10

## 2023-04-09 RX ORDER — MAGNESIUM SULFATE 500 MG/ML
1 VIAL (ML) INJECTION ONCE
Refills: 0 | Status: COMPLETED | OUTPATIENT
Start: 2023-04-09 | End: 2023-04-09

## 2023-04-09 RX ADMIN — Medication 100 MILLIGRAM(S): at 06:00

## 2023-04-09 RX ADMIN — PANTOPRAZOLE SODIUM 40 MILLIGRAM(S): 20 TABLET, DELAYED RELEASE ORAL at 06:00

## 2023-04-09 RX ADMIN — Medication 100 MILLIGRAM(S): at 13:47

## 2023-04-09 RX ADMIN — Medication 5 MILLIGRAM(S): at 17:16

## 2023-04-09 RX ADMIN — Medication 100 MILLIGRAM(S): at 21:05

## 2023-04-09 RX ADMIN — Medication 1 PACKET(S): at 08:41

## 2023-04-09 RX ADMIN — Medication 100 GRAM(S): at 13:50

## 2023-04-09 RX ADMIN — FAMOTIDINE 20 MILLIGRAM(S): 10 INJECTION INTRAVENOUS at 21:05

## 2023-04-09 RX ADMIN — ENOXAPARIN SODIUM 40 MILLIGRAM(S): 100 INJECTION SUBCUTANEOUS at 17:18

## 2023-04-09 RX ADMIN — Medication 1 PACKET(S): at 17:19

## 2023-04-09 RX ADMIN — Medication 1 PACKET(S): at 13:49

## 2023-04-09 RX ADMIN — SIMVASTATIN 10 MILLIGRAM(S): 20 TABLET, FILM COATED ORAL at 21:05

## 2023-04-09 RX ADMIN — Medication 650 MILLIGRAM(S): at 01:06

## 2023-04-09 NOTE — PHYSICAL THERAPY INITIAL EVALUATION ADULT - PLANNED THERAPY INTERVENTIONS, PT EVAL
Goal Pt will negotiate one flight of steps w /one handrail 2 weeks/balance training/gait training/strengthening/transfer training

## 2023-04-09 NOTE — PROGRESS NOTE ADULT - SUBJECTIVE AND OBJECTIVE BOX
[facility] Division of Hospital Medicine  Sy Anderson MD  Available via MS Teams  Pager: [phone]    SUBJECTIVE / OVERNIGHT EVENTS:  - no events overnight, states that her bowel movements are more formed now, but still generally loose. Denies any watery diarrhea, any brbpr, nausea, vomiting, chest pain, sob, cough, headaches, confusion. States her weakness is somewhat improving, now more able to walk.     ADDITIONAL REVIEW OF SYSTEMS:    MEDICATIONS  (STANDING):  ceFAZolin   IVPB 1000 milliGRAM(s) IV Intermittent every 8 hours  enoxaparin Injectable 40 milliGRAM(s) SubCutaneous every 24 hours  famotidine    Tablet 20 milliGRAM(s) Oral <User Schedule>  magnesium sulfate  IVPB 1 Gram(s) IV Intermittent once  pantoprazole    Tablet 40 milliGRAM(s) Oral before breakfast  potassium phosphate / sodium phosphate Powder (PHOS-NaK) 1 Packet(s) Oral three times a day with meals  simvastatin 10 milliGRAM(s) Oral at bedtime    MEDICATIONS  (PRN):  acetaminophen     Tablet .. 650 milliGRAM(s) Oral every 6 hours PRN Temp greater or equal to 38C (100.4F), Mild Pain (1 - 3)  aluminum hydroxide/magnesium hydroxide/simethicone Suspension 30 milliLiter(s) Oral every 4 hours PRN Dyspepsia  melatonin 3 milliGRAM(s) Oral at bedtime PRN Insomnia  ondansetron Injectable 4 milliGRAM(s) IV Push every 8 hours PRN Nausea and/or Vomiting      I&O's Summary    08 Apr 2023 07:01  -  09 Apr 2023 07:00  --------------------------------------------------------  IN: 640 mL / OUT: 700 mL / NET: -60 mL        PHYSICAL EXAM:  Vital Signs Last 24 Hrs  T(C): 36.6 (09 Apr 2023 08:46), Max: 37.6 (09 Apr 2023 01:16)  T(F): 97.9 (09 Apr 2023 08:46), Max: 99.7 (09 Apr 2023 01:16)  HR: 90 (09 Apr 2023 08:46) (90 - 100)  BP: 130/55 (09 Apr 2023 08:46) (115/57 - 130/55)  BP(mean): --  RR: 18 (09 Apr 2023 05:05) (18 - 18)  SpO2: 96% (09 Apr 2023 05:05) (93% - 100%)    Parameters below as of 09 Apr 2023 05:05  Patient On (Oxygen Delivery Method): room air      CONSTITUTIONAL: NAD, well-developed, well-groomed  ENMT: Moist oral mucosa, no pharyngeal injection or exudates  NECK: Supple, no palpable masses;  RESPIRATORY: Normal respiratory effort; lungs are clear to auscultation bilaterally  CARDIOVASCULAR: Regular rate and rhythm, normal S1 and S2, no murmur/rub/gallop; No lower extremity edema; Peripheral pulses are 2+ bilaterally  ABDOMEN: Nontender to palpation, distended abdomen, tympanic to percussion, non-tender to palpation diffusely. hypoactive bowel sounds, no rebound/guarding  MUSCULOSKELETAL:  No clubbing or cyanosis of digits; no joint swelling or tenderness to palpation  PSYCH: A+O to person, place, and time; affect appropriate  NEUROLOGY: CN 2-12 are intact and symmetric; no gross sensory deficits     LABS:                        8.8    7.55  )-----------( 211      ( 09 Apr 2023 07:06 )             26.4     04-09    134<L>  |  102  |  16  ----------------------------<  121<H>  3.7   |  23  |  1.03    Ca    7.6<L>      09 Apr 2023 07:03  Phos  2.7     04-09  Mg     1.8     04-09    TPro  5.3<L>  /  Alb  2.5<L>  /  TBili  0.8  /  DBili  x   /  AST  25  /  ALT  13  /  AlkPhos  63  04-08                  RADIOLOGY & ADDITIONAL TESTS:  New Results Reviewed Today:   New Imaging Personally Reviewed Today:  New Electrocardiogram Personally Reviewed Today:  Prior or Outpatient Records Reviewed Today:    COMMUNICATION:  Care Discussed with Consultants/Other Providers and Details of Discussion:  Discussions with Patient/Family:  PCP Communication:

## 2023-04-09 NOTE — PHYSICAL THERAPY INITIAL EVALUATION ADULT - PERTINENT HX OF CURRENT PROBLEM, REHAB EVAL
79 y/o female admitted to [facility] on 4/4/23  w pmh hld, gerd, osteoporosis, sbo s/p exlap with ?enteropexy?, cipo, breast ca s/p lumpectomy + rt, p/w abdominal pain, found to have dilated small and large bowel iso cipo + focal wall thickening in right colon r/o mass lesion, admitted to medicine for further mgmt.

## 2023-04-09 NOTE — PROGRESS NOTE ADULT - ASSESSMENT
78 F w acute on chronic small and large bowel ileus, complicated by GNR bacteremia    1. 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 Now improved.   -continue home medicine regimen  -would avoid loperamide as it is likely to exacerbate her condition    2. GNR bacteremia. ?translocation   -abx per ID    3. mild bilirubinemia    4. colon thickening on ct, likely colitis from overdistention. outpt follow up with Dr. Gordon.          Attending supervision statement: I have personally seen and examined the patient. I fully participated in the care of this patient. I have made amendments to the documentation where necessary, and agree with the history, physical exam, and plan as outlined by the ACP.    [facility]  Patel Zepeda M.D.   [address] [place], [place]  Office: [phone]

## 2023-04-09 NOTE — PROGRESS NOTE ADULT - SUBJECTIVE AND OBJECTIVE BOX
Chief Complaint:  Patient is a 78y old  Female who presents with a chief complaint of abdominal pain (08 Apr 2023 13:53)      Date of service 04-09-23 @ 09:37      Interval Events:   loose bm  Hospital Medications:  acetaminophen     Tablet .. 650 milliGRAM(s) Oral every 6 hours PRN  aluminum hydroxide/magnesium hydroxide/simethicone Suspension 30 milliLiter(s) Oral every 4 hours PRN  ceFAZolin   IVPB 1000 milliGRAM(s) IV Intermittent every 8 hours  enoxaparin Injectable 40 milliGRAM(s) SubCutaneous every 24 hours  famotidine    Tablet 20 milliGRAM(s) Oral <User Schedule>  loperamide 2 milliGRAM(s) Oral every 6 hours  melatonin 3 milliGRAM(s) Oral at bedtime PRN  ondansetron Injectable 4 milliGRAM(s) IV Push every 8 hours PRN  pantoprazole    Tablet 40 milliGRAM(s) Oral before breakfast  potassium phosphate / sodium phosphate Powder (PHOS-NaK) 1 Packet(s) Oral three times a day with meals  simvastatin 10 milliGRAM(s) Oral at bedtime        Review of Systems:  General:  No wt loss, fevers, chills, night sweats, fatigue,   Eyes:  Good vision, no reported pain  ENT:  No sore throat, pain, runny nose, dysphagia  CV:  No pain, palpitations, hypo/hypertension  Resp:  No dyspnea, cough, tachypnea, wheezing  GI:  See HPI  :  No pain, bleeding, incontinence, nocturia  Muscle:  No pain, weakness  Neuro:  No weakness, tingling, memory problems  Psych:  No fatigue, insomnia, mood problems, depression  Endocrine:  No polyuria, polydipsia, cold/heat intolerance  Heme:  No petechiae, ecchymosis, easy bruisability  Integumentary:  No rash, edema    PHYSICAL EXAM:   Vital Signs:  Vital Signs Last 24 Hrs  T(C): 36.6 (09 Apr 2023 08:46), Max: 37.6 (09 Apr 2023 01:16)  T(F): 97.9 (09 Apr 2023 08:46), Max: 99.7 (09 Apr 2023 01:16)  HR: 90 (09 Apr 2023 08:46) (90 - 100)  BP: 130/55 (09 Apr 2023 08:46) (115/57 - 130/55)  BP(mean): --  RR: 18 (09 Apr 2023 05:05) (18 - 18)  SpO2: 96% (09 Apr 2023 05:05) (93% - 100%)    Parameters below as of 09 Apr 2023 05:05  Patient On (Oxygen Delivery Method): room air      Daily     Daily       PHYSICAL EXAM:     GENERAL:  Appears stated age, well-groomed, well-nourished, no distress  HEENT:  NC/AT,  conjunctivae anicteric, clear and pink,   NECK: supple, trachea midline  CHEST:  Full & symmetric excursion, no increased effort, breath sounds clear  HEART:  Regular rhythm, no JVD  ABDOMEN:  Soft, non-tender, non-distended, normoactive bowel sounds,  no masses , no hepatosplenomegaly  EXTREMITIES:  no cyanosis,clubbing or edema  SKIN:  No rash, erythema, or, ecchymoses, no jaundice  NEURO:  Alert, non-focal, no asterixis  PSYCH: Appropriate affect, oriented to place and time  RECTAL: Deferred      LABS Personally reviewed by me:                        8.8    7.55  )-----------( 211      ( 09 Apr 2023 07:06 )             26.4     Mean Cell Volume: 86.8 fl (04-09-23 @ 07:06)    04-09    134<L>  |  102  |  16  ----------------------------<  121<H>  3.7   |  23  |  1.03    Ca    7.6<L>      09 Apr 2023 07:03  Phos  1.2     04-08  Mg     1.7     04-08    TPro  5.3<L>  /  Alb  2.5<L>  /  TBili  0.8  /  DBili  x   /  AST  25  /  ALT  13  /  AlkPhos  63  04-08    LIVER FUNCTIONS - ( 08 Apr 2023 06:48 )  Alb: 2.5 g/dL / Pro: 5.3 g/dL / ALK PHOS: 63 U/L / ALT: 13 U/L / AST: 25 U/L / GGT: x                                       8.8    7.55  )-----------( 211      ( 09 Apr 2023 07:06 )             26.4                         9.3    9.07  )-----------( 246      ( 08 Apr 2023 06:55 )             28.3                         10.0   6.69  )-----------( 208      ( 07 Apr 2023 07:30 )             32.5       Imaging personally reviewed by me:

## 2023-04-09 NOTE — PHYSICAL THERAPY INITIAL EVALUATION ADULT - ADDITIONAL COMMENTS
lives w/ lives alone in 2 family home, few steps to enter w/ handrail/ and flight of steps to living area, has chair lift,  does not use assistive device, glasses for reading, hearing fair and pt is ERENDIRA hanson

## 2023-04-09 NOTE — PHYSICAL THERAPY INITIAL EVALUATION ADULT - DIAGNOSIS, PT EVAL
decreased balance and upright motor control. decreased O2 sat in bed, better when seated and walking

## 2023-04-10 ENCOUNTER — TRANSCRIPTION ENCOUNTER (OUTPATIENT)
Age: 78
End: 2023-04-10

## 2023-04-10 VITALS
DIASTOLIC BLOOD PRESSURE: 64 MMHG | SYSTOLIC BLOOD PRESSURE: 123 MMHG | TEMPERATURE: 98 F | RESPIRATION RATE: 18 BRPM | OXYGEN SATURATION: 94 % | HEART RATE: 92 BPM

## 2023-04-10 DIAGNOSIS — R78.81 BACTEREMIA: ICD-10-CM

## 2023-04-10 LAB
ANION GAP SERPL CALC-SCNC: 14 MMOL/L — SIGNIFICANT CHANGE UP (ref 5–17)
BUN SERPL-MCNC: 15 MG/DL — SIGNIFICANT CHANGE UP (ref 7–23)
CALCIUM SERPL-MCNC: 7.6 MG/DL — LOW (ref 8.4–10.5)
CHLORIDE SERPL-SCNC: 101 MMOL/L — SIGNIFICANT CHANGE UP (ref 96–108)
CO2 SERPL-SCNC: 22 MMOL/L — SIGNIFICANT CHANGE UP (ref 22–31)
CREAT SERPL-MCNC: 0.99 MG/DL — SIGNIFICANT CHANGE UP (ref 0.5–1.3)
EGFR: 58 ML/MIN/1.73M2 — LOW
GLUCOSE SERPL-MCNC: 108 MG/DL — HIGH (ref 70–99)
HCT VFR BLD CALC: 26.4 % — LOW (ref 34.5–45)
HGB BLD-MCNC: 8.5 G/DL — LOW (ref 11.5–15.5)
MAGNESIUM SERPL-MCNC: 1.6 MG/DL — SIGNIFICANT CHANGE UP (ref 1.6–2.6)
MCHC RBC-ENTMCNC: 28.1 PG — SIGNIFICANT CHANGE UP (ref 27–34)
MCHC RBC-ENTMCNC: 32.2 GM/DL — SIGNIFICANT CHANGE UP (ref 32–36)
MCV RBC AUTO: 87.4 FL — SIGNIFICANT CHANGE UP (ref 80–100)
NRBC # BLD: 0 /100 WBCS — SIGNIFICANT CHANGE UP (ref 0–0)
PHOSPHATE SERPL-MCNC: 2.9 MG/DL — SIGNIFICANT CHANGE UP (ref 2.5–4.5)
PLATELET # BLD AUTO: 219 K/UL — SIGNIFICANT CHANGE UP (ref 150–400)
POTASSIUM SERPL-MCNC: 3.8 MMOL/L — SIGNIFICANT CHANGE UP (ref 3.5–5.3)
POTASSIUM SERPL-SCNC: 3.8 MMOL/L — SIGNIFICANT CHANGE UP (ref 3.5–5.3)
RBC # BLD: 3.02 M/UL — LOW (ref 3.8–5.2)
RBC # FLD: 14.8 % — HIGH (ref 10.3–14.5)
SODIUM SERPL-SCNC: 137 MMOL/L — SIGNIFICANT CHANGE UP (ref 135–145)
WBC # BLD: 5.91 K/UL — SIGNIFICANT CHANGE UP (ref 3.8–10.5)
WBC # FLD AUTO: 5.91 K/UL — SIGNIFICANT CHANGE UP (ref 3.8–10.5)

## 2023-04-10 PROCEDURE — 99239 HOSP IP/OBS DSCHRG MGMT >30: CPT

## 2023-04-10 RX ORDER — LANOLIN ALCOHOL/MO/W.PET/CERES
1 CREAM (GRAM) TOPICAL
Qty: 0 | Refills: 0 | DISCHARGE
Start: 2023-04-10

## 2023-04-10 RX ORDER — ACETAMINOPHEN 500 MG
2 TABLET ORAL
Qty: 0 | Refills: 0 | DISCHARGE
Start: 2023-04-10

## 2023-04-10 RX ORDER — POTASSIUM CHLORIDE 20 MEQ
20 PACKET (EA) ORAL ONCE
Refills: 0 | Status: COMPLETED | OUTPATIENT
Start: 2023-04-10 | End: 2023-04-10

## 2023-04-10 RX ORDER — CEFAZOLIN SODIUM 1 G
1000 VIAL (EA) INJECTION ONCE
Refills: 0 | Status: COMPLETED | OUTPATIENT
Start: 2023-04-10 | End: 2023-04-10

## 2023-04-10 RX ORDER — MISOPROSTOL 200 UG/1
1 TABLET ORAL
Refills: 0 | DISCHARGE

## 2023-04-10 RX ORDER — CEFAZOLIN SODIUM 1 G
1000 VIAL (EA) INJECTION EVERY 8 HOURS
Refills: 0 | Status: DISCONTINUED | OUTPATIENT
Start: 2023-04-10 | End: 2023-04-10

## 2023-04-10 RX ORDER — MAGNESIUM OXIDE 400 MG ORAL TABLET 241.3 MG
800 TABLET ORAL ONCE
Refills: 0 | Status: COMPLETED | OUTPATIENT
Start: 2023-04-10 | End: 2023-04-10

## 2023-04-10 RX ORDER — CALCIUM GLUCONATE 100 MG/ML
1 VIAL (ML) INTRAVENOUS ONCE
Refills: 0 | Status: COMPLETED | OUTPATIENT
Start: 2023-04-10 | End: 2023-04-10

## 2023-04-10 RX ADMIN — MAGNESIUM OXIDE 400 MG ORAL TABLET 800 MILLIGRAM(S): 241.3 TABLET ORAL at 08:56

## 2023-04-10 RX ADMIN — PANTOPRAZOLE SODIUM 40 MILLIGRAM(S): 20 TABLET, DELAYED RELEASE ORAL at 05:03

## 2023-04-10 RX ADMIN — Medication 20 MILLIEQUIVALENT(S): at 08:56

## 2023-04-10 RX ADMIN — Medication 5 MILLIGRAM(S): at 13:57

## 2023-04-10 RX ADMIN — Medication 100 MILLIGRAM(S): at 05:03

## 2023-04-10 RX ADMIN — Medication 100 GRAM(S): at 08:55

## 2023-04-10 RX ADMIN — Medication 100 MILLIGRAM(S): at 19:20

## 2023-04-10 RX ADMIN — Medication 100 MILLIGRAM(S): at 13:52

## 2023-04-10 RX ADMIN — SIMVASTATIN 10 MILLIGRAM(S): 20 TABLET, FILM COATED ORAL at 20:01

## 2023-04-10 NOTE — PROGRESS NOTE ADULT - ASSESSMENT
78 F w acute on chronic small and large bowel ileus, complicated by GNR bacteremia    1. 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 Now improved.   -continue home medicine regimen  -would avoid loperamide as it is likely to exacerbate her condition    2. GNR bacteremia. ?translocation   -abx per ID    3. mild bilirubinemia    4. colon thickening on ct, likely colitis from overdistention. outpt follow up with Dr. Gordon.          Attending supervision statement: I have personally seen and examined the patient. I fully participated in the care of this patient. I have made amendments to the documentation where necessary, and agree with the history, physical exam, and plan as outlined by the ACP.    [facility]  Patel Zepeda M.D.   [address] [place], [place]  Office: [phone]

## 2023-04-10 NOTE — DISCHARGE NOTE PROVIDER - HOSPITAL COURSE
78 year old female with hx of bladder surgery (age 15), intestinal dysmotility, SBO s/p exlap with intestinal pexy? (1984, no bowel resection), hysterectomy (2005), presents with abdominal pain x 5 days. Reports pain in bilateral lower abdomen, associated with nausea and constipation. Patient with known hx and family hx of intestinal dysmotility, takes miralax at home for constipation. Normally has 2-3 BM daily, but has not had BM at home 3 days.  CT a/p with small and large bowel dilatation without obstructing mass, chronic intestinal pseudoobstruction, focal wall thickening in the R colon for which colonoscopy is recommended to exclude a mass lesion. Patient hemodynamically stable, nonperitoneal on exam, with normal WBC. Surgery consulted. Recommended  No acute surgical intervention, NPO, IVF resuscitation, bowel rest ,NGT placement and  Rectal decompression with malecot drain placement. symptoms improved with + flatus and BMs. removed  the NGT (minimal output). rectal tube removed. noted to have diarrhea. cdiff neg. Diet gradually advanced to low residue. will need outpatient f/u for further evaluation of colonic thickening noted on CT scan - may need colonoscopy as outpatient     Noted to have though with 37% bandemia with High grade E. coli bacteremia. seen by ID.  Suspect likely GI gut translocation as source. UA not consistent with cystitis. Transitioned to cefazolin 1g IV q8h on 4/5/23 and received  total 7 days of therapy (end date, Monday 4/10/23). repeat blood culture 4/6 NGTD    Developed BLAKE (acute kidney injury). non-AG metabolic acidosis, hypokalemia, hyponatremia  form prerenal azotemia vs. ATN in setting of severe sepsis and lactic acidosis. s/p 1/2NS + 75 mEq NaHCO3 . acidosis /blake /hyponatremia has improved.    Pt now tolerating diet with normal stools. Completed antibiotics. Medically cleared for discharge home with GI, PCP follow up.

## 2023-04-10 NOTE — PROGRESS NOTE ADULT - SUBJECTIVE AND OBJECTIVE BOX
Chief Complaint:  Patient is a 78y old  Female who presents with a chief complaint of abdominal pain (10 Apr 2023 11:38)      Date of service 04-10-23 @ 12:53      Interval Events:   Patient seen and examined.   Having soft/loose stools.   Tolerating diet well.   No abdominal pain, nausea or vomiting.     Hospital Medications:  acetaminophen     Tablet .. 650 milliGRAM(s) Oral every 6 hours PRN  aluminum hydroxide/magnesium hydroxide/simethicone Suspension 30 milliLiter(s) Oral every 4 hours PRN  ceFAZolin   IVPB 1000 milliGRAM(s) IV Intermittent every 8 hours  enoxaparin Injectable 40 milliGRAM(s) SubCutaneous every 24 hours  famotidine    Tablet 20 milliGRAM(s) Oral <User Schedule>  melatonin 3 milliGRAM(s) Oral at bedtime PRN  metoclopramide 5 milliGRAM(s) Oral daily  ondansetron Injectable 4 milliGRAM(s) IV Push every 8 hours PRN  pantoprazole    Tablet 40 milliGRAM(s) Oral before breakfast  simvastatin 10 milliGRAM(s) Oral at bedtime        Review of Systems:  General:  No wt loss, fevers, chills, night sweats, fatigue,   Eyes:  Good vision, no reported pain  ENT:  No sore throat, pain, runny nose, dysphagia  CV:  No pain, palpitations, hypo/hypertension  Resp:  No dyspnea, cough, tachypnea, wheezing  GI:  See HPI  :  No pain, bleeding, incontinence, nocturia  Muscle:  No pain, weakness  Neuro:  No weakness, tingling, memory problems  Psych:  No fatigue, insomnia, mood problems, depression  Endocrine:  No polyuria, polydipsia, cold/heat intolerance  Heme:  No petechiae, ecchymosis, easy bruisability  Integumentary:  No rash, edema    PHYSICAL EXAM:   Vital Signs:  Vital Signs Last 24 Hrs  T(C): 36.8 (10 Apr 2023 09:07), Max: 37.8 (10 Apr 2023 00:47)  T(F): 98.2 (10 Apr 2023 09:07), Max: 100.1 (10 Apr 2023 00:47)  HR: 96 (10 Apr 2023 09:07) (64 - 99)  BP: 114/65 (10 Apr 2023 09:07) (114/65 - 141/74)  BP(mean): --  RR: 18 (10 Apr 2023 09:07) (18 - 18)  SpO2: 93% (10 Apr 2023 09:07) (93% - 97%)    Parameters below as of 10 Apr 2023 09:07  Patient On (Oxygen Delivery Method): room air      Daily     Daily       PHYSICAL EXAM:     GENERAL:  Appears stated age, well-groomed, well-nourished, no distress  HEENT:  NC/AT,  conjunctivae anicteric, clear and pink,   NECK: supple, trachea midline  CHEST:  Full & symmetric excursion, no increased effort, breath sounds clear  HEART:  Regular rhythm, no JVD  ABDOMEN:  Soft, non-tender, non-distended, normoactive bowel sounds,  no masses , no hepatosplenomegaly  EXTREMITIES:  no cyanosis,clubbing or edema  SKIN:  No rash, erythema, or, ecchymoses, no jaundice  NEURO:  Alert, non-focal, no asterixis  PSYCH: Appropriate affect, oriented to place and time  RECTAL: Deferred      LABS Personally reviewed by me:                        8.5    5.91  )-----------( 219      ( 10 Apr 2023 07:15 )             26.4     Mean Cell Volume: 87.4 fl (04-10-23 @ 07:15)    04-10    137  |  101  |  15  ----------------------------<  108<H>  3.8   |  22  |  0.99    Ca    7.6<L>      10 Apr 2023 07:17  Phos  2.9     04-10  Mg     1.6     04-10                                    8.5    5.91  )-----------( 219      ( 10 Apr 2023 07:15 )             26.4                         8.8    7.55  )-----------( 211      ( 09 Apr 2023 07:06 )             26.4                         9.3    9.07  )-----------( 246      ( 08 Apr 2023 06:55 )             28.3       Imaging personally reviewed by me:

## 2023-04-10 NOTE — PROGRESS NOTE ADULT - PROBLEM SELECTOR PROBLEM 4
BLAKE (acute kidney injury)
GERD (gastroesophageal reflux disease)
BLAKE (acute kidney injury)

## 2023-04-10 NOTE — PROGRESS NOTE ADULT - PROBLEM SELECTOR PLAN 7
- Transition to lovenox 40 mg SQ daily for VTE PPx now that creatinine is normalized    D/C planning to home pending transition to PO antibiotics vs. in-house completion of therapy. Will need diarrhea to apryl before consideration for discharge home.    Patient interested in establishing care with a new PMD on discharge, recommend Dr. Steven Block - information passed on to his office
- Transition to lovenox 40 mg SQ daily for VTE PPx now that creatinine is normalized    D/C planning to home pending transition to PO antibiotics vs. in-house completion of therapy. Will need diarrhea to apryl before consideration for discharge home.    Patient interested in establishing care with a new PMD on discharge, recommend Dr. Steven Block - information passed on to his office
- Continue statin
- Transition to lovenox 40 mg SQ daily for VTE PPx now that creatinine is normalized    dc planning - patient prefers to complete IV abx inpatient and not transition to po abx. Can likely be discharged 4/10 pending repeat bloodwork in AM.     Patient interested in establishing care with a new PMD on discharge, recommend Dr. Steven Block - information passed on to his office
- lovenox 40 mg SQ daily for VTE PPx    PT: home PT     DC planning to home today after completion of IV abx   40 mins spent on DC planning     Patient interested in establishing care with a new PMD on discharge, recommended Dr. Steven Block - information passed on to his office. Information provided on DC paperwork
- Continue statin

## 2023-04-10 NOTE — PROGRESS NOTE ADULT - PROBLEM SELECTOR PLAN 3
prerenal azotemia vs. ATN in setting of severe sepsis, lactic acidosis, non-AG metabolic acidosis and hypokalemia (improved) in setting of diarrhea, now with hyponatremia (improved)  - dc'd 1/2NS + 75 mEq NaHCO3 as acidosis has improved   - Avoid nephrotoxic medications  - Encourage PO intake  - Monitor stools  - repleting K, Phos, Mg (improved now)   - Trend BMP  - Monitor urine output/retention
prerenal azotemia vs. ATN in setting of severe sepsis, lactic acidosis. Also with non-AG metabolic acidosis and hypokalemia in setting of diarrhea, now with hyponatremia as well.  - Start 1/2NS + 75 mEq NaHCO3 infusion today. D/C oral supplementation.  - Avoid nephrotoxic medications  - Encourage PO intake  - Monitor stools  - Potassium chloride 40 mEq PO x 1 today  - Trend BMP  - Monitor urine output/retention
In setting of sepsis, now improved  - Continue to trend CBC with differential  - Once acute issues are resolved, may benefit from outpatient hematology evaluation for relative lymphopenia and anemia
prerenal azotemia vs. ATN in setting of severe sepsis, lactic acidosis. Also with non-AG metabolic acidosis and hypokalemia in setting of diarrhea, now with hyponatremia as well.  - on 1/2NS + 75 mEq NaHCO3 infusion, if acidosis improves, will dc gtt   - D/C oral supplementation.  - Avoid nephrotoxic medications  - Encourage PO intake  - Monitor stools  - repleting K   - Trend BMP  - Monitor urine output/retention
prerenal azotemia vs. ATN in setting of severe sepsis, lactic acidosis, non-AG metabolic acidosis and hypokalemia (improved) in setting of diarrhea, now with hyponatremia (resolved)  - dc'd 1/2NS + 75 mEq NaHCO3 as acidosis has improved   - Avoid nephrotoxic medications  - Encourage PO intake  - Monitor stools  - repleting K, Phos, Mg (resolved now)   - Trend BMP  - Monitor urine output/retention
In setting of sepsis, now improving. Left shift appears less pronounced on testing today  - Continue to trend CBC with differential  - Once acute issues are resolved, may benefit from outpatient hematology evaluation for relative lymphopenia and anemia

## 2023-04-10 NOTE — PROGRESS NOTE ADULT - PROBLEM SELECTOR PROBLEM 3
BLAKE (acute kidney injury)
Leukopenia
BLAKE (acute kidney injury)
Leukopenia

## 2023-04-10 NOTE — PROGRESS NOTE ADULT - REASON FOR ADMISSION
abdominal pain

## 2023-04-10 NOTE — PROGRESS NOTE ADULT - PROVIDER SPECIALTY LIST ADULT
Gastroenterology
Infectious Disease
Gastroenterology
Hospitalist
Surgery
Surgery
Gastroenterology
Infectious Disease
Gastroenterology
Hospitalist
Hospitalist
Internal Medicine

## 2023-04-10 NOTE — DISCHARGE NOTE PROVIDER - CARE PROVIDERS DIRECT ADDRESSES
,DirectAddress_Unknown,[email or web address],DirectAddress_Unknown

## 2023-04-10 NOTE — DISCHARGE NOTE PROVIDER - PROVIDER TOKENS
PROVIDER:[TOKEN:[[id number]],FOLLOWUP:[1 week]],PROVIDER:[TOKEN:[[id number]],FOLLOWUP:[2 weeks]],PROVIDER:[TOKEN:[[id number]],FOLLOWUP:[1 week]]

## 2023-04-10 NOTE — DISCHARGE NOTE PROVIDER - NSDCCPCAREPLAN_GEN_ALL_CORE_FT
PRINCIPAL DISCHARGE DIAGNOSIS  Diagnosis: Intestinal pseudoobstruction  Assessment and Plan of Treatment: you had acute on chronic psuedoobstruction   you were treated with bowel rest and decompression  continue low residue diet  AVoid Loperamide  Follow up with surgery and Gastro enterology   you will need colonoscopy for further evaluation of colonic thickening         SECONDARY DISCHARGE DIAGNOSES  Diagnosis: Bacteremia  Assessment and Plan of Treatment: you were treated with 7 days of antibiotics for Ecoli bacteremia    Diagnosis: BLAKE (acute kidney injury)  Assessment and Plan of Treatment: Resolved    Diagnosis: Gastric dysmotility  Assessment and Plan of Treatment: continue current medications     PRINCIPAL DISCHARGE DIAGNOSIS  Diagnosis: Intestinal pseudoobstruction  Assessment and Plan of Treatment: you had acute on chronic psuedoobstruction   you were treated with bowel rest and decompression  continue low residue diet  AVoid Loperamide  Follow up with surgery and Gastro enterology   you will need colonoscopy for further evaluation of colonic thickening         SECONDARY DISCHARGE DIAGNOSES  Diagnosis: Bacteremia  Assessment and Plan of Treatment: completed iv cefazolin.   Call you Health care provider upon arrival home to make a one week follow up appointment.  If you develop fever, chills, malaise, or change in mental status call your Health Care Provider or go to the Emergency Department.  Nutrition is important, eat small frequent meals to help ensure you get adequate calories.  Do not stay in bed all day!  Increase your activity daily as tolerated.      Diagnosis: BLAKE (acute kidney injury)  Assessment and Plan of Treatment: Resolved. Avoid taking (NSAIDs) - (ex: Ibuprofen, Advil, Celebrex, Naprosyn)  Avoid taking any nephrotoxic agents (can harm kidneys) - Intravenous contrast for diagnostic testing, combination cold medications.  Have all medications adjusted for your renal function by your Health Care Provider.  Blood pressure control is important.  Take all medication as prescribed.      Diagnosis: Gastric dysmotility  Assessment and Plan of Treatment: continue current medications

## 2023-04-10 NOTE — PROGRESS NOTE ADULT - PROBLEM SELECTOR PLAN 4
- Continue PPI  - Continue H2 blocker with renal dosing  - Holding misoprostol
- Continue PPI  - Continue H2 blocker with renal dosing  - Holding misoprostol
prerenal azotemia vs. ATN in setting of severe sepsis, lactic acidosis. Also with non-AG metabolic acidosis and hypokalemia in setting of rectal decompression.  - Avoid nephrotoxic medications  - Encourage PO intake  - Monitor stools  - Potassium chloride 40 mEq PO x 1 today  - Start sodium bicarb 650 mg PO BID in setting of non-AGMA due to diarrhea.  - Trend BMP  - Monitor urine output/retention
- Continue PPI  - Continue H2 blocker with renal dosing  - Holding misoprostol
- Continue PPI  - Continue H2 blocker with renal dosing  - Holding misoprostol
prerenal azotemia vs. ATN in setting of severe sepsis, lactic acidosis. Also with non-AG metabolic acidosis and hypokalemia in setting of rectal decompression.  - Avoid nephrotoxic medications  - Encourage PO intake  - Monitor stools  - Potassium chloride 40 mEq PO x 2 today  - Trend BMP  - Monitor urine output/retention

## 2023-04-10 NOTE — PROGRESS NOTE ADULT - PROBLEM SELECTOR PLAN 5
- Continue statin
- Continue statin
Multifactorial in setting of intra-abdominal process, lack of mobility while rectal tube in place, morphine use in ED, advanced age. Improved  - Encourage ambulation  - Monitor PVRs via bladder scan  - Straight cath PRN if symptomatic or significant retention  - Avoid anti-cholinergic agents  - Creatinine improved; doubt retention is significant contributor to BLAKE
- Continue statin
- Continue statin
Multifactorial in setting of intra-abdominal process, lack of mobility while rectal tube in place, morphine use in ED, advanced age  - Encourage ambulation today  - Monitor PVRs via bladder scan  - Straight cath PRN if symptomatic or significant retention  - Avoid anti-cholinergic agents; will d/c morphine today  - Creatinine improving; doubt retention is significant contributor to BLAKE

## 2023-04-10 NOTE — PROGRESS NOTE ADULT - NSPROGADDITIONALINFOA_GEN_ALL_CORE
d/w acp    Sy Anderson MD  [facility] Division of Hospital Medicine  Available via MS Teams  Pager: [phone]
d/w acp    Sy Anderson MD  [facility] Division of Hospital Medicine  Available via MS Teams  Pager: [phone]
Discussed with ACPKatherin

## 2023-04-10 NOTE — DISCHARGE NOTE NURSING/CASE MANAGEMENT/SOCIAL WORK - PATIENT PORTAL LINK FT
You can access the FollowMyHealth Patient Portal offered by [facility] by registering at the following website: [email or web address]. By joining [facility]’s FollowMyHealth portal, you will also be able to view your health information using other applications (apps) compatible with our system.

## 2023-04-10 NOTE — DISCHARGE NOTE PROVIDER - ATTENDING DISCHARGE PHYSICAL EXAMINATION:
CONSTITUTIONAL: NAD, well-developed   EYES: PERRLA; conjunctiva and sclera clear  ENMT: Moist oral mucosa, no pharyngeal injection or exudates   NECK: Supple   RESPIRATORY: Normal respiratory effort; lungs are clear to auscultation bilaterally  CARDIOVASCULAR: Regular rate and rhythm, normal S1 and S2, no murmur   ABDOMEN: Nontender to palpation, normoactive bowel sounds   MUSCULOSKELETAL: no clubbing or cyanosis of digits; no joint swelling or tenderness to palpation  PSYCH: A+O to person, place, and time; affect appropriate  NEUROLOGY: no gross sensory deficits   SKIN: No rashes

## 2023-04-10 NOTE — DISCHARGE NOTE PROVIDER - CARE PROVIDER_API CALL
Roscoe Gordon  GASTROENTEROLOGY  [address], [address]  [place] [address]  Phone: [phone]  Fax: [phone]  Follow Up Time: 1 week    Violeta Lau)  ColonRectal Surgery; Surgery  [address], [address]  [place] [address]  Phone: [phone]  Fax: [phone]  Follow Up Time: 2 weeks    Steven Block)  Internal Medicine  [address] [facility], [address] [place] [address]  Phone: [phone]  Fax: [phone]  Follow Up Time: 1 week

## 2023-04-10 NOTE — PROGRESS NOTE ADULT - PROBLEM SELECTOR PLAN 6
- Resume prolia/vitamin D supplementation as outpatient once acute issues are resolved
- Continue PPI  - Continue H2 blocker with renal dosing  - Holding misoprostol
- Resume prolia/vitamin D supplementation as outpatient once acute issues are resolved
- Continue PPI  - Continue H2 blocker with renal dosing  - Holding misoprostol

## 2023-04-10 NOTE — PROGRESS NOTE ADULT - PROBLEM SELECTOR PLAN 2
obstruction seem resolved, watery diarrhea now improving   - GI f/u - loperamide dc'd   - C. diff PCR negative  - Regular, low residue diet as tolerated  - Serial abdominal exams  - Hold outpatient bowel regimen in setting of diarrhea  - Outpatient f/u for further evaluation of colonic thickening noted on CT scan - may need colonoscopy as outpatient  - overall diarrhea improving and stools are more formed now. overall fatigue is also improving, but not yet at baseline
obstruction seem resolved, but with persistent watery diarrhea  - GI f/u today  - Check C. diff PCR  - Regular, low residue diet as tolerated  - Serial abdominal exams  - Hold outpatient bowel regimen in setting of diarrhea  - Outpatient f/u for further evaluation of colonic thickening noted on CT scan - may need colonoscopy as outpatient
- GI f/u appreciated  - Regular, low residue diet as tolerated  - Serial abdominal exams  - Hold outpatient bowel regimen in setting of diarrhea  - Outpatient f/u for further evaluation of colonic thickening noted on CT scan - may need colonoscopy as outpatient
obstruction seem resolved, watery diarrhea now improving   - GI f/u - loperamide dc'd; patient aware not to take as outpatient   - C. diff PCR negative  - Regular, low residue diet as tolerated  - Serial abdominal exams  - Hold outpatient bowel regimen in setting of diarrhea; resume on DC   - Outpatient f/u for further evaluation of colonic thickening noted on CT scan - may need colonoscopy as outpatient  - overall diarrhea improving and stools are more formed now. overall fatigue is also improving, but not yet at baseline
obstruction seem resolved, but with persistent watery diarrhea  - GI f/u   - C. diff PCR negative  - Regular, low residue diet as tolerated  - Serial abdominal exams  - Hold outpatient bowel regimen in setting of diarrhea  - Outpatient f/u for further evaluation of colonic thickening noted on CT scan - may need colonoscopy as outpatient  - overall diarrhea improving and stools are more formed
- GI f/u appreciated  - Advance diet as tolerated today  - Serial abdominal exams  - Eventual re-introduction of outpatient bowel regimen

## 2023-04-10 NOTE — PROGRESS NOTE ADULT - PROBLEM SELECTOR PROBLEM 5
HLD (hyperlipidemia)
Urinary retention
HLD (hyperlipidemia)
Urinary retention

## 2023-04-10 NOTE — PROGRESS NOTE ADULT - PROBLEM SELECTOR PROBLEM 7
HLD (hyperlipidemia)
Need for prophylactic measure
HLD (hyperlipidemia)

## 2023-04-10 NOTE — PROGRESS NOTE ADULT - PROBLEM SELECTOR PLAN 1
E. coli bacteremia with sepsis present on admission, now improving. Suspect likely GI gut translocation as source. UA not consistent with cystitis.  - ID evaluation appreciated  - Transitioned to cefazolin 1g IV q8h on 4/5/23  - Plan for 7 days total of therapy (end date, Monday 4/10/23)  - repeat blood culture 4/6 NGTD  - Trend CBC with differential  - Serial abdominal exams
E. coli bacteremia with sepsis present on admission, now improving. Suspect likely GI gut translocation as source. UA not consistent with cystitis.  - Continue ceftriaxone 2g IV daily - initial testing not consistent with ESBL organism  - ID evaluation pending for today  - Clinically improving, appears uncomplicated - defer repeat BCx at this time, pending above  - Trend CBC with differential  - Serial abdominal exams
E. coli bacteremia with sepsis present on admission, now improving. Suspect likely GI gut translocation as source. UA not consistent with cystitis.  - ID evaluation appreciated  - Transitioned to cefazolin 1g IV q8h on 4/5/23  - Plan for 7 days total of therapy (end date, Monday 4/10/23)  - repeat blood culture 4/6 NGTD  - Trend CBC with differential  - Serial abdominal exams
E. coli bacteremia with sepsis present on admission, now improving. Suspect likely GI gut translocation as source. UA not consistent with cystitis.  - ID evaluation appreciated  - Transitioned to cefazolin 1g IV q8h yesterday, tolerating well  - Plan for 7 days total of therapy (end date, Monday 4/10/23)  - F/U repeat BCx (ordered)  - Trend CBC with differential  - Serial abdominal exams  - For discharge planning purposes, will consider PO regimen, although patient states she may have difficulty tolerating from a gastrointestinal perspective. Can also consider short course of IV antibiotics at home (i.e., ceftriaxone IV daily) to complete course if patient stable for d/c. Will discuss with care coordination.
E. coli bacteremia with sepsis present on admission, now improving. Suspect likely GI gut translocation as source. UA not consistent with cystitis.  - ID evaluation appreciated  - Transitioned to cefazolin 1g IV q8h on 4/5/23  - Plan for 7 days total of therapy (end date, Monday 4/10/23)  - F/U repeat BCx (ordered)  - Trend CBC with differential  - Serial abdominal exams
E. coli bacteremia with sepsis present on admission, now improving. Suspect likely GI gut translocation as source. UA not consistent with cystitis.  - ID evaluation appreciated  - Transitioned to cefazolin 1g IV q8h on 4/5/23  - Plan for 7 days total of therapy (end date, Monday 4/10/23)  - repeat blood culture 4/6 NGTD  - Trend CBC with differential  - Serial abdominal exams  - to complete course of IV abx tonight with plans for DC home afterwards; patient in agreement

## 2023-04-10 NOTE — DISCHARGE NOTE PROVIDER - NSDCFUADDAPPT_GEN_ALL_CORE_FT
APPTS ARE READY TO BE MADE: [x] YES    Best Family or Patient Contact (if needed):    Additional Information about above appointments (if needed):    1:   2:   3:     Other comments or requests:    APPTS ARE READY TO BE MADE: [x] YES    Best Family or Patient Contact (if needed):    Additional Information about above appointments (if needed):    1:   2:   3:     Other comments or requests:   Patient was provided with follow up request details via email and was advised to call to schedule follow up within specified time frame.

## 2023-04-10 NOTE — DISCHARGE NOTE PROVIDER - NSDCMRMEDTOKEN_GEN_ALL_CORE_FT
ergocalciferol 1.25 mg (50,000 intl units) oral tablet: 1 tab(s) orally 2 times a week  famotidine 40 mg oral tablet: 1 tab(s) orally once a day (at bedtime)  MiraLax oral powder for reconstitution: 17 gram(s) orally once a day  miSOPROStol 200 mcg oral tablet: 1 tab(s) orally 2 times a day  Prolia 60 mg/mL subcutaneous solution: 60 milligram(s) subcutaneously  Protonix 40 mg oral delayed release tablet: 1 tab(s) orally once a day  Reglan 10 mg oral tablet: 1 tab(s) orally 4 times a day  simvastatin 10 mg oral tablet: 1 tab(s) orally once a day (at bedtime)   acetaminophen 325 mg oral tablet: 2 tab(s) orally every 6 hours As needed Temp greater or equal to 38C (100.4F), Mild Pain (1 - 3)  aluminum hydroxide-magnesium hydroxide 200 mg-200 mg/5 mL oral suspension: 30 milliliter(s) orally every 4 hours As needed Dyspepsia  ergocalciferol 1.25 mg (50,000 intl units) oral tablet: 1 tab(s) orally 2 times a week  famotidine 40 mg oral tablet: 1 tab(s) orally once a day (at bedtime)  melatonin 3 mg oral tablet: 1 tab(s) orally once a day (at bedtime) As needed Insomnia  MiraLax oral powder for reconstitution: 17 gram(s) orally once a day  Prolia 60 mg/mL subcutaneous solution: 60 milligram(s) subcutaneously  Protonix 40 mg oral delayed release tablet: 1 tab(s) orally once a day  Reglan 10 mg oral tablet: 1 tab(s) orally 4 times a day  simvastatin 10 mg oral tablet: 1 tab(s) orally once a day (at bedtime)

## 2023-04-10 NOTE — PROGRESS NOTE ADULT - PROBLEM/PLAN-3
Pt now discharged, wanting f/up with [facility] in clinic. Clinic scheduled 10/19. Pt prescribed encouraged to take creon by local doc, will fill [facility]. Teaching done regarding medication.    Maite Corona RN Care Coordinator    
DISPLAY PLAN FREE TEXT

## 2023-04-10 NOTE — PROGRESS NOTE ADULT - PROBLEM SELECTOR PROBLEM 6
GERD (gastroesophageal reflux disease)
Osteoporosis
Osteoporosis
GERD (gastroesophageal reflux disease)
Osteoporosis
Osteoporosis

## 2023-04-10 NOTE — PROGRESS NOTE ADULT - SUBJECTIVE AND OBJECTIVE BOX
[facility] Division of Hospital Medicine  Kim Fletcher MD  Available via MS Teams    SUBJECTIVE / OVERNIGHT EVENTS: No acute events overnight. Patient feeling well overall. Denies any new complaints or concerns at this time.     Review of Systems:   CONSTITUTIONAL: No fever   EYES: No eye pain, visual disturbances, or discharge  ENMT: No difficulty hearing   RESPIRATORY: No SOB. No cough   CARDIOVASCULAR: No chest pain   GASTROINTESTINAL: No abdominal or epigastric pain. No nausea, vomiting, or hematemesis; No diarrhea    GENITOURINARY: No dysuria   NEUROLOGICAL: No headaches   SKIN: No itching    MUSCULOSKELETAL: No joint pain or swelling; No muscle or back pain  PSYCHIATRIC: No depression or anxiety   HEME/LYMPH: No easy bruising or bleeding gums      MEDICATIONS  (STANDING):  ceFAZolin   IVPB 1000 milliGRAM(s) IV Intermittent every 8 hours  enoxaparin Injectable 40 milliGRAM(s) SubCutaneous every 24 hours  famotidine    Tablet 20 milliGRAM(s) Oral <User Schedule>  metoclopramide 5 milliGRAM(s) Oral daily  pantoprazole    Tablet 40 milliGRAM(s) Oral before breakfast  simvastatin 10 milliGRAM(s) Oral at bedtime    MEDICATIONS  (PRN):  acetaminophen     Tablet .. 650 milliGRAM(s) Oral every 6 hours PRN Temp greater or equal to 38C (100.4F), Mild Pain (1 - 3)  aluminum hydroxide/magnesium hydroxide/simethicone Suspension 30 milliLiter(s) Oral every 4 hours PRN Dyspepsia  melatonin 3 milliGRAM(s) Oral at bedtime PRN Insomnia  ondansetron Injectable 4 milliGRAM(s) IV Push every 8 hours PRN Nausea and/or Vomiting      I&O's Summary    09 Apr 2023 07:01  -  10 Apr 2023 07:00  --------------------------------------------------------  IN: 580 mL / OUT: 0 mL / NET: 580 mL      PHYSICAL EXAM:  Vital Signs Last 24 Hrs  T(C): 36.8 (10 Apr 2023 09:07), Max: 37.8 (10 Apr 2023 00:47)  T(F): 98.2 (10 Apr 2023 09:07), Max: 100.1 (10 Apr 2023 00:47)  HR: 96 (10 Apr 2023 09:07) (64 - 99)  BP: 114/65 (10 Apr 2023 09:07) (114/65 - 141/74)  RR: 18 (10 Apr 2023 09:07) (18 - 18)  SpO2: 93% (10 Apr 2023 09:07) (93% - 97%)    Parameters below as of 10 Apr 2023 09:07  Patient On (Oxygen Delivery Method): room air      CONSTITUTIONAL: NAD, well-developed   EYES: PERRLA; conjunctiva and sclera clear  ENMT: Moist oral mucosa, no pharyngeal injection or exudates   NECK: Supple   RESPIRATORY: Normal respiratory effort; lungs are clear to auscultation bilaterally  CARDIOVASCULAR: Regular rate and rhythm, normal S1 and S2, no murmur   ABDOMEN: Nontender to palpation, normoactive bowel sounds   MUSCULOSKELETAL: no clubbing or cyanosis of digits; no joint swelling or tenderness to palpation  PSYCH: A+O to person, place, and time; affect appropriate  NEUROLOGY: no gross sensory deficits   SKIN: No rashes     LABS:                        8.5    5.91  )-----------( 219      ( 10 Apr 2023 07:15 )             26.4     04-10    137  |  101  |  15  ----------------------------<  108<H>  3.8   |  22  |  0.99    Ca    7.6<L>      10 Apr 2023 07:17  Phos  2.9     04-10  Mg     1.6     04-10        RADIOLOGY & ADDITIONAL TESTS:  New Imaging Personally Reviewed Today:  New Electrocardiogram Personally Reviewed Today:  Other Results Reviewed Today:   Prior or Outpatient Records Reviewed Today with Summary:    COORDINATION OF CARE:  Consultant Communication and Details of Discussion (where applicable):

## 2023-04-11 LAB
CULTURE RESULTS: SIGNIFICANT CHANGE UP
CULTURE RESULTS: SIGNIFICANT CHANGE UP
SPECIMEN SOURCE: SIGNIFICANT CHANGE UP
SPECIMEN SOURCE: SIGNIFICANT CHANGE UP

## 2023-04-12 ENCOUNTER — INPATIENT (INPATIENT)
Facility: HOSPITAL | Age: 78
LOS: 6 days | Discharge: HOME | DRG: 872 | End: 2023-04-19
Attending: HOSPITALIST | Admitting: STUDENT IN AN ORGANIZED HEALTH CARE EDUCATION/TRAINING PROGRAM
Payer: MEDICARE

## 2023-04-12 VITALS
WEIGHT: 134.04 LBS | OXYGEN SATURATION: 93 % | TEMPERATURE: 100 F | RESPIRATION RATE: 18 BRPM | HEART RATE: 113 BPM | DIASTOLIC BLOOD PRESSURE: 63 MMHG | SYSTOLIC BLOOD PRESSURE: 128 MMHG | HEIGHT: 62 IN

## 2023-04-12 DIAGNOSIS — Z90.49 ACQUIRED ABSENCE OF OTHER SPECIFIED PARTS OF DIGESTIVE TRACT: Chronic | ICD-10-CM

## 2023-04-12 LAB
ALBUMIN SERPL ELPH-MCNC: 2.6 G/DL — LOW (ref 3.3–5)
ALP SERPL-CCNC: 70 U/L — SIGNIFICANT CHANGE UP (ref 40–120)
ALT FLD-CCNC: 11 U/L — SIGNIFICANT CHANGE UP (ref 10–45)
ANION GAP SERPL CALC-SCNC: 12 MMOL/L — SIGNIFICANT CHANGE UP (ref 5–17)
AST SERPL-CCNC: 20 U/L — SIGNIFICANT CHANGE UP (ref 10–40)
BASE EXCESS BLDV CALC-SCNC: -1.9 MMOL/L — SIGNIFICANT CHANGE UP (ref -2–3)
BASOPHILS # BLD AUTO: 0.08 K/UL — SIGNIFICANT CHANGE UP (ref 0–0.2)
BASOPHILS NFR BLD AUTO: 1.7 % — SIGNIFICANT CHANGE UP (ref 0–2)
BILIRUB SERPL-MCNC: 0.6 MG/DL — SIGNIFICANT CHANGE UP (ref 0.2–1.2)
BUN SERPL-MCNC: 17 MG/DL — SIGNIFICANT CHANGE UP (ref 7–23)
CA-I SERPL-SCNC: 1.07 MMOL/L — LOW (ref 1.15–1.33)
CALCIUM SERPL-MCNC: 7.6 MG/DL — LOW (ref 8.4–10.5)
CHLORIDE BLDV-SCNC: 99 MMOL/L — SIGNIFICANT CHANGE UP (ref 96–108)
CHLORIDE SERPL-SCNC: 98 MMOL/L — SIGNIFICANT CHANGE UP (ref 96–108)
CO2 BLDV-SCNC: 22 MMOL/L — SIGNIFICANT CHANGE UP (ref 22–26)
CO2 SERPL-SCNC: 21 MMOL/L — LOW (ref 22–31)
CREAT SERPL-MCNC: 1.06 MG/DL — SIGNIFICANT CHANGE UP (ref 0.5–1.3)
EGFR: 53 ML/MIN/1.73M2 — LOW
EOSINOPHIL # BLD AUTO: 0.04 K/UL — SIGNIFICANT CHANGE UP (ref 0–0.5)
EOSINOPHIL NFR BLD AUTO: 0.9 % — SIGNIFICANT CHANGE UP (ref 0–6)
GAS PNL BLDV: 128 MMOL/L — LOW (ref 136–145)
GAS PNL BLDV: SIGNIFICANT CHANGE UP
GLUCOSE BLDV-MCNC: 122 MG/DL — HIGH (ref 70–99)
GLUCOSE SERPL-MCNC: 122 MG/DL — HIGH (ref 70–99)
HCO3 BLDV-SCNC: 22 MMOL/L — SIGNIFICANT CHANGE UP (ref 22–29)
HCT VFR BLD CALC: 29.1 % — LOW (ref 34.5–45)
HCT VFR BLDA CALC: 30 % — LOW (ref 34.5–46.5)
HGB BLD CALC-MCNC: 9.7 G/DL — LOW (ref 11.7–16.1)
HGB BLD-MCNC: 9.4 G/DL — LOW (ref 11.5–15.5)
LACTATE BLDV-MCNC: 1.8 MMOL/L — SIGNIFICANT CHANGE UP (ref 0.5–2)
LIDOCAIN IGE QN: 292 U/L — HIGH (ref 7–60)
LYMPHOCYTES # BLD AUTO: 0.56 K/UL — LOW (ref 1–3.3)
LYMPHOCYTES # BLD AUTO: 11.3 % — LOW (ref 13–44)
MANUAL SMEAR VERIFICATION: SIGNIFICANT CHANGE UP
MCHC RBC-ENTMCNC: 28.2 PG — SIGNIFICANT CHANGE UP (ref 27–34)
MCHC RBC-ENTMCNC: 32.3 GM/DL — SIGNIFICANT CHANGE UP (ref 32–36)
MCV RBC AUTO: 87.4 FL — SIGNIFICANT CHANGE UP (ref 80–100)
MONOCYTES # BLD AUTO: 0.3 K/UL — SIGNIFICANT CHANGE UP (ref 0–0.9)
MONOCYTES NFR BLD AUTO: 6.1 % — SIGNIFICANT CHANGE UP (ref 2–14)
NEUTROPHILS # BLD AUTO: 3.99 K/UL — SIGNIFICANT CHANGE UP (ref 1.8–7.4)
NEUTROPHILS NFR BLD AUTO: 71.3 % — SIGNIFICANT CHANGE UP (ref 43–77)
NEUTS BAND # BLD: 8.7 % — HIGH (ref 0–8)
PCO2 BLDV: 31 MMHG — LOW (ref 39–42)
PH BLDV: 7.45 — HIGH (ref 7.32–7.43)
PLAT MORPH BLD: NORMAL — SIGNIFICANT CHANGE UP
PLATELET # BLD AUTO: 274 K/UL — SIGNIFICANT CHANGE UP (ref 150–400)
PO2 BLDV: 25 MMHG — SIGNIFICANT CHANGE UP (ref 25–45)
POLYCHROMASIA BLD QL SMEAR: SLIGHT — SIGNIFICANT CHANGE UP
POTASSIUM BLDV-SCNC: 3.2 MMOL/L — LOW (ref 3.5–5.1)
POTASSIUM SERPL-MCNC: 3.3 MMOL/L — LOW (ref 3.5–5.3)
POTASSIUM SERPL-SCNC: 3.3 MMOL/L — LOW (ref 3.5–5.3)
PROT SERPL-MCNC: 5.7 G/DL — LOW (ref 6–8.3)
RBC # BLD: 3.33 M/UL — LOW (ref 3.8–5.2)
RBC # FLD: 14.6 % — HIGH (ref 10.3–14.5)
RBC BLD AUTO: SIGNIFICANT CHANGE UP
SAO2 % BLDV: 63.2 % — LOW (ref 67–88)
SODIUM SERPL-SCNC: 131 MMOL/L — LOW (ref 135–145)
WBC # BLD: 4.99 K/UL — SIGNIFICANT CHANGE UP (ref 3.8–10.5)
WBC # FLD AUTO: 4.99 K/UL — SIGNIFICANT CHANGE UP (ref 3.8–10.5)

## 2023-04-12 PROCEDURE — 99285 EMERGENCY DEPT VISIT HI MDM: CPT

## 2023-04-12 RX ORDER — POTASSIUM PHOSPHATE, MONOBASIC POTASSIUM PHOSPHATE, DIBASIC 236; 224 MG/ML; MG/ML
15 INJECTION, SOLUTION INTRAVENOUS ONCE
Refills: 0 | Status: COMPLETED | OUTPATIENT
Start: 2023-04-12 | End: 2023-04-12

## 2023-04-12 RX ORDER — VANCOMYCIN HCL 1 G
1000 VIAL (EA) INTRAVENOUS ONCE
Refills: 0 | Status: DISCONTINUED | OUTPATIENT
Start: 2023-04-12 | End: 2023-04-12

## 2023-04-12 RX ORDER — CEFEPIME 1 G/1
1000 INJECTION, POWDER, FOR SOLUTION INTRAMUSCULAR; INTRAVENOUS EVERY 12 HOURS
Refills: 0 | Status: DISCONTINUED | OUTPATIENT
Start: 2023-04-12 | End: 2023-04-13

## 2023-04-12 RX ORDER — SODIUM CHLORIDE 9 MG/ML
1000 INJECTION, SOLUTION INTRAVENOUS ONCE
Refills: 0 | Status: COMPLETED | OUTPATIENT
Start: 2023-04-12 | End: 2023-04-12

## 2023-04-12 RX ORDER — VANCOMYCIN HCL 1 G
500 VIAL (EA) INTRAVENOUS EVERY 6 HOURS
Refills: 0 | Status: DISCONTINUED | OUTPATIENT
Start: 2023-04-12 | End: 2023-04-13

## 2023-04-12 RX ORDER — POTASSIUM CHLORIDE 20 MEQ
40 PACKET (EA) ORAL EVERY 4 HOURS
Refills: 0 | Status: COMPLETED | OUTPATIENT
Start: 2023-04-12 | End: 2023-04-13

## 2023-04-12 RX ORDER — MAGNESIUM OXIDE 400 MG ORAL TABLET 241.3 MG
400 TABLET ORAL ONCE
Refills: 0 | Status: COMPLETED | OUTPATIENT
Start: 2023-04-12 | End: 2023-04-12

## 2023-04-12 RX ORDER — ACETAMINOPHEN 500 MG
1000 TABLET ORAL ONCE
Refills: 0 | Status: COMPLETED | OUTPATIENT
Start: 2023-04-12 | End: 2023-04-12

## 2023-04-12 RX ADMIN — MAGNESIUM OXIDE 400 MG ORAL TABLET 400 MILLIGRAM(S): 241.3 TABLET ORAL at 23:53

## 2023-04-12 RX ADMIN — Medication 40 MILLIEQUIVALENT(S): at 23:53

## 2023-04-12 RX ADMIN — Medication 1000 MILLIGRAM(S): at 23:20

## 2023-04-12 RX ADMIN — CEFEPIME 100 MILLIGRAM(S): 1 INJECTION, POWDER, FOR SOLUTION INTRAMUSCULAR; INTRAVENOUS at 23:50

## 2023-04-12 RX ADMIN — SODIUM CHLORIDE 1000 MILLILITER(S): 9 INJECTION, SOLUTION INTRAVENOUS at 22:50

## 2023-04-12 RX ADMIN — Medication 400 MILLIGRAM(S): at 22:50

## 2023-04-12 NOTE — ED PROVIDER NOTE - CLINICAL SUMMARY MEDICAL DECISION MAKING FREE TEXT BOX
78 year old female with hx of bladder surgery (age 15), intestinal dysmotility, SBO s/p exlap with intestinal pexy? (1984, no bowel resection), hysterectomy (2005), recently admitted for abdominal pain, presenting with fatigue iso watery diarrhea. At time of last admisison, CT a/p at admission with small and large bowel dilatation w/o obstructing mass, chronic intestinal pseudoobstruction, focal wall thickening in the R colon for which colonoscopy is recommended to exclude a mass lesion; was treated nonsurgically with NPO, IVF resuscitation, bowel rest, NGT placement and rectal decompression with malecot drain placement. Also noted to have diarrhea, C diff was neg but with high grade E. coli bacteremia; was seen by ID (suspected likely GI gut translocation as source) s/p 7 days of Abx (end date, Monday 4/10/23). Repeat blood culture 4/6 NGTD. Patient now stating she has continued to have watery stools and feels dehydrated. Reporting subjective fevers at home, noted to have T 100.5 in waiting room. Denies blood in stools, N/V/C, abdominal pain, dysuria, hematuria, flank pain, dizziness, LOC, LAD. Per daughter, pt also altered, unable to finish sentence easily.    CBC w/o leukocytosis, CMP, VBG pending. UA/UCx, BCx collected. Will send C diff, GI PCR, stool cx. Give 1L LR. Febrile to 103, PO vanco, IV cefepime x1. Will admit to medicine 78 year old female with hx of bladder surgery (age 15), intestinal dysmotility, SBO s/p exlap with intestinal pexy? (1984, no bowel resection), hysterectomy (2005), recently admitted for abdominal pain, presenting with fatigue iso watery diarrhea. At time of last admisison, CT a/p at admission with small and large bowel dilatation w/o obstructing mass, chronic intestinal pseudoobstruction, focal wall thickening in the R colon for which colonoscopy is recommended to exclude a mass lesion; was treated nonsurgically with NPO, IVF resuscitation, bowel rest, NGT placement and rectal decompression with malecot drain placement. Also noted to have diarrhea, C diff was neg but with high grade E. coli bacteremia; was seen by ID (suspected likely GI gut translocation as source) s/p 7 days of Abx (end date, Monday 4/10/23). Repeat blood culture 4/6 NGTD. Patient now stating she has continued to have watery stools and feels dehydrated. Reporting subjective fevers at home, noted to have T 100.5 in waiting room. Denies blood in stools, N/V/C, abdominal pain, dysuria, hematuria, flank pain, dizziness, LOC, LAD. Per daughter, pt also altered, unable to finish sentence easily.    CBC w/o leukocytosis, CMP, VBG pending. UA/UCx, BCx collected. Will send C diff, GI PCR, stool cx. Give 1L LR. Febrile to 103, PO vanco, IV cefepime x1. Will admit to medicine [facility]

## 2023-04-12 NOTE — ED PROVIDER NOTE - PHYSICAL EXAMINATION
VITALS:   T(C): 36.8 (04-12-23 @ 22:15), Max: 37.8 (04-12-23 @ 20:50)  HR: 99 (04-12-23 @ 22:15) (99 - 113)  BP: 143/56 (04-12-23 @ 22:15) (128/63 - 143/56)  RR: 18 (04-12-23 @ 22:15) (18 - 20)  SpO2: 95% (04-12-23 @ 22:15) (90% - 95%)    PHYSICAL EXAM:     GENERAL: NAD, lying in bed comfortably.  HEAD:  Atraumatic, normocephalic.  EYES: PERRL, conjunctiva and sclera clear.  ENT: Moist mucous membranes.  NECK: Supple, no LAD, trachea midline.  CHEST/LUNG: CTAB. No rales, rhonchi, wheezing, or rubs. Unlabored respirations.  HEART: RRR, no M/R/G, S1/S2  ABDOMEN: Soft, nontender, +distended. Normoactive bowel sounds.  EXTREMITIES:  2+ peripheral pulses b/l. No clubbing, cyanosis, or edema.  Neurological:  AAOx3, no focal deficits.   SKIN: No rashes or lesions.  PSYCH: Normal affect and mood.

## 2023-04-12 NOTE — ED ADULT NURSE NOTE - OBJECTIVE STATEMENT
Pt is a 78y F c/o dehydration, generalized weakness, fever, watery diarrhea. Pt had recent hospital admission, had watery diarrhea, was d/c'd this week, started having fevers yesterday, continues to have watery diarrhea. Pt denies blood in stools, N/V, abd pain. Pt is A&Ox3, neuro status intact, breathing unlabored and spontaneous, full ROM of all extremities. Pt resting in stretcher, placed on cardiac monitor, on 2L NC O2, bed in lowest position, educated on call bell, family member at bedside, aware of plan of care. Comfort and safety measures maintained.

## 2023-04-12 NOTE — ED PROVIDER NOTE - OBJECTIVE STATEMENT
78 year old female with hx of bladder surgery (age 15), intestinal dysmotility, SBO s/p exlap with intestinal pexy? (1984, no bowel resection), hysterectomy (2005), recently admitted for abdominal pain, presenting with fatigue iso watery diarrhea. At time of last admisison, CT a/p at admission with small and large bowel dilatation w/o obstructing mass, chronic intestinal pseudoobstruction, focal wall thickening in the R colon for which colonoscopy is recommended to exclude a mass lesion; was treated nonsurgically with NPO, IVF resuscitation, bowel rest, NGT placement and rectal decompression with malecot drain placement. Also noted to have diarrhea, C diff was neg but with high grade E. coli bacteremia; was seen by ID (suspected likely GI gut translocation as source) s/p 7 days of Abx (end date, Monday 4/10/23). repeat blood culture 4/6 NGTD. Patient now stating she has continued to have watery stools and feels dehydrated. Reporting subjective fevers at home, noted to have T 100.5 in waiting room. Denies blood in stools, N/V/C, abdominal pain, dysuria, hematuria, flank pain, dizziness, LOC, LAD. Per daughter, pt also altered, unable to finish sentence easily.    PCP: Clay Draper  GI: Dr. Lorenzo

## 2023-04-12 NOTE — ED PROVIDER NOTE - RAPID ASSESSMENT
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  normally distended, feels she might be dehydrated.     *** I, Martin Lawrence MD, performed an initial face to face bedside interview with this patient regarding history of present illness and determined that the patient should be evaluated in the main ED. A physical exam was not performed due to private space availability. This patient's evaluation is NOT COMPLETE and only preliminary. The full assessment, management, and reassessment of this patient, including but not limited to the follow up of ordered laboratory and radiologic testing, is deferred to the main ED provider. ***

## 2023-04-13 DIAGNOSIS — R74.8 ABNORMAL LEVELS OF OTHER SERUM ENZYMES: ICD-10-CM

## 2023-04-13 DIAGNOSIS — K21.9 GASTRO-ESOPHAGEAL REFLUX DISEASE WITHOUT ESOPHAGITIS: ICD-10-CM

## 2023-04-13 DIAGNOSIS — A41.9 SEPSIS, UNSPECIFIED ORGANISM: ICD-10-CM

## 2023-04-13 DIAGNOSIS — K59.9 FUNCTIONAL INTESTINAL DISORDER, UNSPECIFIED: ICD-10-CM

## 2023-04-13 DIAGNOSIS — Z29.9 ENCOUNTER FOR PROPHYLACTIC MEASURES, UNSPECIFIED: ICD-10-CM

## 2023-04-13 DIAGNOSIS — R19.7 DIARRHEA, UNSPECIFIED: ICD-10-CM

## 2023-04-13 DIAGNOSIS — E87.8 OTHER DISORDERS OF ELECTROLYTE AND FLUID BALANCE, NOT ELSEWHERE CLASSIFIED: ICD-10-CM

## 2023-04-13 DIAGNOSIS — R06.00 DYSPNEA, UNSPECIFIED: ICD-10-CM

## 2023-04-13 PROBLEM — K59.89 OTHER SPECIFIED FUNCTIONAL INTESTINAL DISORDERS: Chronic | Status: ACTIVE | Noted: 2023-04-03

## 2023-04-13 PROBLEM — E78.5 HYPERLIPIDEMIA, UNSPECIFIED: Chronic | Status: ACTIVE | Noted: 2023-04-04

## 2023-04-13 PROBLEM — C50.919 MALIGNANT NEOPLASM OF UNSPECIFIED SITE OF UNSPECIFIED FEMALE BREAST: Chronic | Status: ACTIVE | Noted: 2023-04-03

## 2023-04-13 PROBLEM — M81.0 AGE-RELATED OSTEOPOROSIS WITHOUT CURRENT PATHOLOGICAL FRACTURE: Chronic | Status: ACTIVE | Noted: 2023-04-04

## 2023-04-13 LAB
ALBUMIN SERPL ELPH-MCNC: 2.3 G/DL — LOW (ref 3.3–5)
ALP SERPL-CCNC: 61 U/L — SIGNIFICANT CHANGE UP (ref 40–120)
ALT FLD-CCNC: 8 U/L — LOW (ref 10–45)
ANION GAP SERPL CALC-SCNC: 12 MMOL/L — SIGNIFICANT CHANGE UP (ref 5–17)
APPEARANCE UR: CLEAR — SIGNIFICANT CHANGE UP
AST SERPL-CCNC: 13 U/L — SIGNIFICANT CHANGE UP (ref 10–40)
BASOPHILS # BLD AUTO: 0.02 K/UL — SIGNIFICANT CHANGE UP (ref 0–0.2)
BASOPHILS NFR BLD AUTO: 0.5 % — SIGNIFICANT CHANGE UP (ref 0–2)
BILIRUB SERPL-MCNC: 0.6 MG/DL — SIGNIFICANT CHANGE UP (ref 0.2–1.2)
BILIRUB UR-MCNC: NEGATIVE — SIGNIFICANT CHANGE UP
BUN SERPL-MCNC: 15 MG/DL — SIGNIFICANT CHANGE UP (ref 7–23)
CALCIUM SERPL-MCNC: 7.2 MG/DL — LOW (ref 8.4–10.5)
CHLORIDE SERPL-SCNC: 102 MMOL/L — SIGNIFICANT CHANGE UP (ref 96–108)
CO2 SERPL-SCNC: 22 MMOL/L — SIGNIFICANT CHANGE UP (ref 22–31)
COLOR SPEC: YELLOW — SIGNIFICANT CHANGE UP
CREAT SERPL-MCNC: 1.02 MG/DL — SIGNIFICANT CHANGE UP (ref 0.5–1.3)
DIFF PNL FLD: NEGATIVE — SIGNIFICANT CHANGE UP
EGFR: 56 ML/MIN/1.73M2 — LOW
EOSINOPHIL # BLD AUTO: 0.02 K/UL — SIGNIFICANT CHANGE UP (ref 0–0.5)
EOSINOPHIL NFR BLD AUTO: 0.5 % — SIGNIFICANT CHANGE UP (ref 0–6)
GI PCR PANEL: SIGNIFICANT CHANGE UP
GLUCOSE SERPL-MCNC: 116 MG/DL — HIGH (ref 70–99)
GLUCOSE UR QL: NEGATIVE — SIGNIFICANT CHANGE UP
HCT VFR BLD CALC: 25.8 % — LOW (ref 34.5–45)
HGB BLD-MCNC: 8.2 G/DL — LOW (ref 11.5–15.5)
IMM GRANULOCYTES NFR BLD AUTO: 2.1 % — HIGH (ref 0–0.9)
KETONES UR-MCNC: NEGATIVE — SIGNIFICANT CHANGE UP
LEUKOCYTE ESTERASE UR-ACNC: NEGATIVE — SIGNIFICANT CHANGE UP
LYMPHOCYTES # BLD AUTO: 0.76 K/UL — LOW (ref 1–3.3)
LYMPHOCYTES # BLD AUTO: 18.1 % — SIGNIFICANT CHANGE UP (ref 13–44)
MAGNESIUM SERPL-MCNC: 1.3 MG/DL — LOW (ref 1.6–2.6)
MCHC RBC-ENTMCNC: 27.9 PG — SIGNIFICANT CHANGE UP (ref 27–34)
MCHC RBC-ENTMCNC: 31.8 GM/DL — LOW (ref 32–36)
MCV RBC AUTO: 87.8 FL — SIGNIFICANT CHANGE UP (ref 80–100)
MONOCYTES # BLD AUTO: 0.58 K/UL — SIGNIFICANT CHANGE UP (ref 0–0.9)
MONOCYTES NFR BLD AUTO: 13.8 % — SIGNIFICANT CHANGE UP (ref 2–14)
NEUTROPHILS # BLD AUTO: 2.73 K/UL — SIGNIFICANT CHANGE UP (ref 1.8–7.4)
NEUTROPHILS NFR BLD AUTO: 65 % — SIGNIFICANT CHANGE UP (ref 43–77)
NITRITE UR-MCNC: NEGATIVE — SIGNIFICANT CHANGE UP
NRBC # BLD: 0 /100 WBCS — SIGNIFICANT CHANGE UP (ref 0–0)
PH UR: 6 — SIGNIFICANT CHANGE UP (ref 5–8)
PHOSPHATE SERPL-MCNC: 3.8 MG/DL — SIGNIFICANT CHANGE UP (ref 2.5–4.5)
PLATELET # BLD AUTO: 245 K/UL — SIGNIFICANT CHANGE UP (ref 150–400)
POTASSIUM SERPL-MCNC: 3.5 MMOL/L — SIGNIFICANT CHANGE UP (ref 3.5–5.3)
POTASSIUM SERPL-SCNC: 3.5 MMOL/L — SIGNIFICANT CHANGE UP (ref 3.5–5.3)
PROT SERPL-MCNC: 4.9 G/DL — LOW (ref 6–8.3)
PROT UR-MCNC: SIGNIFICANT CHANGE UP
RBC # BLD: 2.94 M/UL — LOW (ref 3.8–5.2)
RBC # FLD: 14.5 % — SIGNIFICANT CHANGE UP (ref 10.3–14.5)
SARS-COV-2 RNA SPEC QL NAA+PROBE: SIGNIFICANT CHANGE UP
SODIUM SERPL-SCNC: 136 MMOL/L — SIGNIFICANT CHANGE UP (ref 135–145)
SP GR SPEC: 1.02 — SIGNIFICANT CHANGE UP (ref 1.01–1.02)
UROBILINOGEN FLD QL: SIGNIFICANT CHANGE UP
WBC # BLD: 4.2 K/UL — SIGNIFICANT CHANGE UP (ref 3.8–10.5)
WBC # FLD AUTO: 4.2 K/UL — SIGNIFICANT CHANGE UP (ref 3.8–10.5)

## 2023-04-13 PROCEDURE — 71275 CT ANGIOGRAPHY CHEST: CPT | Mod: 26

## 2023-04-13 PROCEDURE — 99223 1ST HOSP IP/OBS HIGH 75: CPT | Mod: GC

## 2023-04-13 PROCEDURE — 74177 CT ABD & PELVIS W/CONTRAST: CPT | Mod: 26

## 2023-04-13 RX ORDER — SODIUM CHLORIDE 9 MG/ML
1000 INJECTION, SOLUTION INTRAVENOUS
Refills: 0 | Status: DISCONTINUED | OUTPATIENT
Start: 2023-04-13 | End: 2023-04-19

## 2023-04-13 RX ORDER — METRONIDAZOLE 500 MG
TABLET ORAL
Refills: 0 | Status: DISCONTINUED | OUTPATIENT
Start: 2023-04-13 | End: 2023-04-14

## 2023-04-13 RX ORDER — POLYETHYLENE GLYCOL 3350 17 G/17G
17 POWDER, FOR SOLUTION ORAL
Refills: 0 | DISCHARGE

## 2023-04-13 RX ORDER — SIMVASTATIN 20 MG/1
1 TABLET, FILM COATED ORAL
Refills: 0 | DISCHARGE

## 2023-04-13 RX ORDER — CEFEPIME 1 G/1
2000 INJECTION, POWDER, FOR SOLUTION INTRAMUSCULAR; INTRAVENOUS EVERY 12 HOURS
Refills: 0 | Status: DISCONTINUED | OUTPATIENT
Start: 2023-04-13 | End: 2023-04-14

## 2023-04-13 RX ORDER — FAMOTIDINE 10 MG/ML
1 INJECTION INTRAVENOUS
Refills: 0 | DISCHARGE

## 2023-04-13 RX ORDER — CEFEPIME 1 G/1
2000 INJECTION, POWDER, FOR SOLUTION INTRAMUSCULAR; INTRAVENOUS ONCE
Refills: 0 | Status: COMPLETED | OUTPATIENT
Start: 2023-04-13 | End: 2023-04-13

## 2023-04-13 RX ORDER — METRONIDAZOLE 500 MG
500 TABLET ORAL EVERY 8 HOURS
Refills: 0 | Status: DISCONTINUED | OUTPATIENT
Start: 2023-04-13 | End: 2023-04-14

## 2023-04-13 RX ORDER — SODIUM CHLORIDE 9 MG/ML
1000 INJECTION, SOLUTION INTRAVENOUS ONCE
Refills: 0 | Status: DISCONTINUED | OUTPATIENT
Start: 2023-04-13 | End: 2023-04-13

## 2023-04-13 RX ORDER — ERGOCALCIFEROL 1.25 MG/1
1 CAPSULE ORAL
Refills: 0 | DISCHARGE

## 2023-04-13 RX ORDER — SODIUM CHLORIDE 9 MG/ML
1000 INJECTION, SOLUTION INTRAVENOUS
Refills: 0 | Status: DISCONTINUED | OUTPATIENT
Start: 2023-04-13 | End: 2023-04-13

## 2023-04-13 RX ORDER — MAGNESIUM SULFATE 500 MG/ML
2 VIAL (ML) INJECTION ONCE
Refills: 0 | Status: COMPLETED | OUTPATIENT
Start: 2023-04-13 | End: 2023-04-13

## 2023-04-13 RX ORDER — MISOPROSTOL 200 UG/1
1 TABLET ORAL
Refills: 0 | DISCHARGE

## 2023-04-13 RX ORDER — VANCOMYCIN HCL 1 G
500 VIAL (EA) INTRAVENOUS EVERY 6 HOURS
Refills: 0 | Status: DISCONTINUED | OUTPATIENT
Start: 2023-04-13 | End: 2023-04-13

## 2023-04-13 RX ORDER — CEFEPIME 1 G/1
INJECTION, POWDER, FOR SOLUTION INTRAMUSCULAR; INTRAVENOUS
Refills: 0 | Status: DISCONTINUED | OUTPATIENT
Start: 2023-04-13 | End: 2023-04-14

## 2023-04-13 RX ORDER — METRONIDAZOLE 500 MG
500 TABLET ORAL ONCE
Refills: 0 | Status: COMPLETED | OUTPATIENT
Start: 2023-04-13 | End: 2023-04-13

## 2023-04-13 RX ORDER — METOCLOPRAMIDE HCL 10 MG
1 TABLET ORAL
Refills: 0 | DISCHARGE

## 2023-04-13 RX ORDER — SIMVASTATIN 20 MG/1
10 TABLET, FILM COATED ORAL AT BEDTIME
Refills: 0 | Status: DISCONTINUED | OUTPATIENT
Start: 2023-04-13 | End: 2023-04-19

## 2023-04-13 RX ORDER — MAGNESIUM SULFATE 500 MG/ML
1 VIAL (ML) INJECTION ONCE
Refills: 0 | Status: DISCONTINUED | OUTPATIENT
Start: 2023-04-13 | End: 2023-04-13

## 2023-04-13 RX ORDER — METHENAMINE MANDELATE 1 G
1 TABLET ORAL
Refills: 0 | DISCHARGE

## 2023-04-13 RX ORDER — VANCOMYCIN HCL 1 G
125 VIAL (EA) INTRAVENOUS EVERY 6 HOURS
Refills: 0 | Status: DISCONTINUED | OUTPATIENT
Start: 2023-04-13 | End: 2023-04-13

## 2023-04-13 RX ORDER — METOCLOPRAMIDE HCL 10 MG
10 TABLET ORAL
Refills: 0 | Status: DISCONTINUED | OUTPATIENT
Start: 2023-04-13 | End: 2023-04-14

## 2023-04-13 RX ORDER — ENOXAPARIN SODIUM 100 MG/ML
40 INJECTION SUBCUTANEOUS EVERY 24 HOURS
Refills: 0 | Status: DISCONTINUED | OUTPATIENT
Start: 2023-04-13 | End: 2023-04-19

## 2023-04-13 RX ORDER — DENOSUMAB 60 MG/ML
60 INJECTION SUBCUTANEOUS
Refills: 0 | DISCHARGE

## 2023-04-13 RX ADMIN — Medication 10 MILLIGRAM(S): at 06:19

## 2023-04-13 RX ADMIN — SODIUM CHLORIDE 100 MILLILITER(S): 9 INJECTION, SOLUTION INTRAVENOUS at 22:09

## 2023-04-13 RX ADMIN — Medication 10 MILLIGRAM(S): at 23:57

## 2023-04-13 RX ADMIN — Medication 10 MILLIGRAM(S): at 18:18

## 2023-04-13 RX ADMIN — CEFEPIME 100 MILLIGRAM(S): 1 INJECTION, POWDER, FOR SOLUTION INTRAMUSCULAR; INTRAVENOUS at 18:17

## 2023-04-13 RX ADMIN — Medication 500 MILLIGRAM(S): at 00:30

## 2023-04-13 RX ADMIN — Medication 40 MILLIEQUIVALENT(S): at 03:27

## 2023-04-13 RX ADMIN — Medication 25 GRAM(S): at 10:18

## 2023-04-13 RX ADMIN — POTASSIUM PHOSPHATE, MONOBASIC POTASSIUM PHOSPHATE, DIBASIC 62.5 MILLIMOLE(S): 236; 224 INJECTION, SOLUTION INTRAVENOUS at 00:22

## 2023-04-13 RX ADMIN — Medication 500 MILLIGRAM(S): at 06:19

## 2023-04-13 RX ADMIN — SIMVASTATIN 10 MILLIGRAM(S): 20 TABLET, FILM COATED ORAL at 22:09

## 2023-04-13 RX ADMIN — CEFEPIME 100 MILLIGRAM(S): 1 INJECTION, POWDER, FOR SOLUTION INTRAMUSCULAR; INTRAVENOUS at 08:59

## 2023-04-13 RX ADMIN — Medication 25 GRAM(S): at 05:34

## 2023-04-13 RX ADMIN — ENOXAPARIN SODIUM 40 MILLIGRAM(S): 100 INJECTION SUBCUTANEOUS at 18:17

## 2023-04-13 NOTE — H&P ADULT - PROBLEM SELECTOR PLAN 2
i/s/o recent antibiotic use, there is concern for C. diff   reportedly has also been using miralax at home    - f/u GI PCR, stool ova and parasites   - f/u C. diff  - maintenance fluids   - f/u blood cultures  - no bowel regimen

## 2023-04-13 NOTE — H&P ADULT - PROBLEM SELECTOR PLAN 3
Hypokalemia with hypomagnesemia i/s/o diarrhea s/p repletion in the ED    - Monitor electrolytes and replete PRN

## 2023-04-13 NOTE — H&P ADULT - NSHPPHYSICALEXAM_GEN_ALL_CORE
VITALS:   T(C): 36.7 (04-13-23 @ 02:00), Max: 39.7 (04-12-23 @ 22:35)  HR: 85 (04-13-23 @ 02:00) (85 - 113)  BP: 119/57 (04-13-23 @ 02:00) (119/57 - 143/56)  RR: 20 (04-13-23 @ 02:00) (18 - 20)  SpO2: 94% (04-13-23 @ 02:00) (90% - 95%)    GENERAL: NAD, lying in bed comfortably  HEAD:  Atraumatic, Normocephalic  EYES: EOMI, PERRLA, conjunctiva and sclera clear  ENT: Moist mucous membranes  NECK: Supple, No JVD  CHEST/LUNG: Clear to auscultation bilaterally; No rales, rhonchi, wheezing, or rubs. Unlabored respirations  HEART: Regular rate and rhythm; No murmurs, rubs, or gallops  ABDOMEN: BSx4; Soft, nontender, nondistended  EXTREMITIES:  2+ Peripheral Pulses, brisk capillary refill. No clubbing, cyanosis, or edema  NERVOUS SYSTEM:  A&Ox3, no focal deficits   SKIN: No rashes or lesions VITALS:   T(C): 36.7 (04-13-23 @ 02:00), Max: 39.7 (04-12-23 @ 22:35)  HR: 85 (04-13-23 @ 02:00) (85 - 113)  BP: 119/57 (04-13-23 @ 02:00) (119/57 - 143/56)  RR: 20 (04-13-23 @ 02:00) (18 - 20)  SpO2: 94% (04-13-23 @ 02:00) (90% - 95%)    GENERAL: NAD, lying in bed comfortably  HEAD:  Atraumatic, Normocephalic  EYES: EOMI,  conjunctiva and sclera clear  ENT: dry mucous membranes  NECK: Supple, No JVD  CHEST/LUNG: Clear to auscultation bilaterally; No rales, rhonchi, wheezing, or rubs. Unlabored respirations  HEART: Regular rate and rhythm; No murmurs, rubs, or gallops  ABDOMEN: BSx4; abd appears bloated   EXTREMITIES:  2+ Peripheral Pulses, brisk capillary refill. No clubbing, cyanosis, or edema  NERVOUS SYSTEM:  A&Ox3, no focal deficits   SKIN: No rashes or lesions

## 2023-04-13 NOTE — H&P ADULT - PROBLEM SELECTOR PLAN 4
Noted to have lipase with no abd pain     - monitor abd exam Reported dyspnea at home   currently on 2LNC  likely related to deconditioning   CT Chest Angio: neg for PE. Trace bilateral effusions and bibasilar atelectasis.    - PT c/s  - wean O2 as tolerated Reported dyspnea at home   currently on 2LNC  likely related to deconditioning   CT Chest Angio: neg for PE. Trace bilateral effusions and bibasilar atelectasis.    - PT c/s  - wean O2 as tolerated  - incentive spirometer

## 2023-04-13 NOTE — H&P ADULT - HISTORY OF PRESENT ILLNESS
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 Recent admission for constipation with CT a/p with small and large bowel dilatation without obstructing mass, chronic intestinal pseudoobstruction, focal wall thickening. Was managed medically with NG tube, rectal decompression with malecot drain with course c/b high grade E. coli bacteremia likely 2/2 GI translocation s/p IV cefazolin until 4/1 with repeat cultures neg.     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 Pt reports since discharge she has been having bowel movements every hour and most recently has decreased to every 2 hours. Not related to food intake. Reports yellowish in color. Was taking miralax daily for years with last dose being yest. Reports she was on clindamycin 1 month ago after dental procedure. No recent changes in meds, no new foods, no sick contacts, no one at home with diarrhea. Denies recent travel. No nausea/vomiting, abd pain, tolerating diet well although she has decreased appetite since discharge. Denies CP, fever/chills, dysuria, hematuria, melena/hematochezia. Family also notes labored breathing with exertion for the past couple of days limiting her mobility.     Of note, was recently admitted for constipation with CT a/p with small and large bowel dilatation without obstructing mass, chronic intestinal pseudoobstruction, focal wall thickening. Was managed medically with NG tube, rectal decompression with malecot drain with course c/b high grade E. coli bacteremia likely 2/2 GI translocation s/p IV cefazolin until 4/1 with repeat cultures neg.     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 Pt reports since discharge she has been having bowel movements every hour and most recently has decreased to every 2 hours. Not related to food intake. Reports yellowish in color. Was taking miralax daily for years with last dose being yest. Reports she was on clindamycin 1 month ago after dental procedure. No recent changes in meds, no new foods, no sick contacts, no one at home with diarrhea. Denies recent travel. No nausea/vomiting, abd pain, tolerating diet well although she has decreased appetite since discharge. Denies CP, fever/chills, dysuria, hematuria, melena/hematochezia. Family also notes labored breathing with exertion for the past couple of days limiting her mobility.     Of note, was recently admitted for constipation with CT a/p with small and large bowel dilatation without obstructing mass, chronic intestinal pseudoobstruction, focal wall thickening. Was managed medically with NG tube, rectal decompression with malecot drain with course c/b high grade E. coli bacteremia likely 2/2 GI translocation s/p IV cefazolin until 4/10 with repeat cultures neg.     In the ED febrile to 100.5, received ofirmev and vanc/cefepime

## 2023-04-13 NOTE — H&P ADULT - PROBLEM SELECTOR PLAN 6
Diet: Regular   DVT Ppx: lovenox on pepcid at home    - c/w famotidine  - hold pepcid given diarrhea

## 2023-04-13 NOTE — CONSULT NOTE ADULT - ASSESSMENT
77 y/o F PMHx multiple prior abdominal surgeries, chronic intestinal pseudoobstruction, and recent admission for e.coli bacteremia (s/p 7d course of IV abx, source suspected GI in setting of pseudoobstruction, discharged home 4/10), now presents with generalized weakness and diarrhea. Patient notes her diarrhea initially began during last admission, test C. Diff negative (4/7) during that admission. In ED, found to have fever 103.5. CT showing L colon fat stranding and wall thickening concerning for colitis. ID consulted for further management.     Impression:  #Diarrhea, Colitis - improving, no BMs thus far today. Patient did take Miralax at home, last dose 4/11. Overall low suspicion for C. Diff given focal colitis on CT.  #Fever, Sepsis - suspect GI source in setting of colitis, will need to r/o recurrent bacteremia from gut translocation    Recs:  - continue with cefepime 2g IV q12H and decrease metronidazole to 500mg q12H  - D/C PO Vancomycin, low suspicion for C. Diff  - check GI PCR  - If watery BMs, recur can send for c. diff testing  - f/u blood cultures  - monitor temp, WBCs      Humberto Mendoza MD  Infectious Disease Fellow  Available on [employer] Teams  Before 9AM or after 5PM: [phone]

## 2023-04-13 NOTE — H&P ADULT - ASSESSMENT
77 YO F with hx of bladder surgery (age 15), intestinal dysmotility, SBO s/p exlap with intestinal pexy? (1984, no bowel resection), hysterectomy (2005), recently admitted for abdominal pain, presenting with fatigue iso watery diarrhea.  79 YO F with hx of bladder surgery (age 15), intestinal dysmotility, SBO s/p exlap with intestinal pexy? (1984, no bowel resection), hysterectomy (2005) admitted with sepsis 2/2 diarrhea i/s/o recent antibiotic use

## 2023-04-13 NOTE — H&P ADULT - NSHPLABSRESULTS_GEN_ALL_CORE
LABS:                        9.4    4.99  )-----------( 274      ( 12 Apr 2023 22:02 )             29.1     04-12    131<L>  |  98  |  17  ----------------------------<  122<H>  3.3<L>   |  21<L>  |  1.06    Ca    7.6<L>      12 Apr 2023 22:02  Phos  2.2     04-12  Mg     1.3     04-12    TPro  5.7<L>  /  Alb  2.6<L>  /  TBili  0.6  /  DBili  x   /  AST  20  /  ALT  11  /  AlkPhos  70  04-12                VBG 04-12 @ 21:51  pH: 7.45/pCO2: 31 /pO2: 25/HCO3: 22/lactate: 1.8    Microbiology Culture Results:   No Growth Final (04-06 @ 11:50)  Culture Results:   No Growth Final (04-06 @ 11:50)      EKG:    RADIOLOGY & ADDITIONAL TESTS:    CT Chest (prelim): No pulmonary embolism.    Trace bilateral effusions and bibasilar atelectasis.    CT Abd: Persistent dilated loops of large and small bowel which are somewhat improved compared to prior from 4.2.23,  Markedly distended urinary bladder. LABS:                        9.4    4.99  )-----------( 274      ( 12 Apr 2023 22:02 )             29.1     04-12    131<L>  |  98  |  17  ----------------------------<  122<H>  3.3<L>   |  21<L>  |  1.06    Ca    7.6<L>      12 Apr 2023 22:02  Phos  2.2     04-12  Mg     1.3     04-12    TPro  5.7<L>  /  Alb  2.6<L>  /  TBili  0.6  /  DBili  x   /  AST  20  /  ALT  11  /  AlkPhos  70  04-12                VBG 04-12 @ 21:51  pH: 7.45/pCO2: 31 /pO2: 25/HCO3: 22/lactate: 1.8    Microbiology Culture Results:   No Growth Final (04-06 @ 11:50)  Culture Results:   No Growth Final (04-06 @ 11:50)      EKG:    RADIOLOGY & ADDITIONAL TESTS:    CT Chest (prelim): No pulmonary embolism.    Trace bilateral effusions and bibasilar atelectasis.    CT Abd (prelim) : Persistent dilated loops of large and small bowel which are somewhat improved compared to prior from 4.2.23,  Markedly distended urinary bladder.

## 2023-04-13 NOTE — CONSULT NOTE ADULT - SUBJECTIVE AND OBJECTIVE BOX
Patient is a 78y old  Female who presents with a chief complaint of diarrhea, fatigue (2023 03:41)    HPI:  77 YO F with hx of bladder surgery (age 15), intestinal dysmotility, SBO s/p exlap with intestinal pexy? (, no bowel resection), hysterectomy (), presenting with fatigue iso watery diarrhea. Pt reports since discharge she has been having bowel movements every hour and most recently has decreased to every 2 hours. Not related to food intake. Reports yellowish in color. Was taking miralax daily for years with last dose being yest. Reports she was on clindamycin 1 month ago after dental procedure. No recent changes in meds, no new foods, no sick contacts, no one at home with diarrhea. Denies recent travel. No nausea/vomiting, abd pain, tolerating diet well although she has decreased appetite since discharge. Denies CP, fever/chills, dysuria, hematuria, melena/hematochezia. Family also notes labored breathing with exertion for the past couple of days limiting her mobility.   Of note, was recently admitted for constipation with CT a/p with small and large bowel dilatation without obstructing mass, chronic intestinal pseudoobstruction, focal wall thickening. Was managed medically with NG tube, rectal decompression with malecot drain with course c/b high grade E. coli bacteremia likely 2/2 GI translocation s/p IV cefazolin until 4/10 with repeat cultures neg.   In the ED febrile to 100.5, received ofirmev and vanc/cefepime    (2023 03:41)     ID consulted for diarrhea and fever. No BMs so far today. Notes diarrhea initially began while she was still admitted last week. No N/V or abdominal pain.     REVIEW OF SYSTEMS  [  ] ROS unobtainable because:    [ x ] All other systems negative except as noted below    Constitutional:  [ ] fever [ ] chills  [ ] weight loss  [ ]night sweat  [ ]poor appetite/PO intake [ ]fatigue   Skin:  [ ] rash [ ] phlebitis	  Eyes: [ ] icterus [ ] pain  [ ] discharge	  ENMT: [ ] sore throat  [ ] thrush [ ] ulcers [ ] exudates [ ]anosmia  Respiratory: [ ] dyspnea [ ] hemoptysis [ ] cough [ ] sputum	  Cardiovascular:  [ ] chest pain [ ] palpitations [ ] edema	  Gastrointestinal:  [ ] nausea [ ] vomiting [ ] diarrhea [ ] constipation [ ] pain	  Genitourinary:  [ ] dysuria [ ] frequency [ ] hematuria [ ] discharge [ ] flank pain  [ ] incontinence  Musculoskeletal:  [ ] myalgias [ ] arthralgias [ ] arthritis  [ ] back pain  Neurological:  [ ] headache [ ] weakness [ ] seizures  [ ] confusion/altered mental status    prior hospital charts reviewed [V]  primary team notes reviewed [V]  other consultant notes reviewed [V]    PAST MEDICAL & SURGICAL HISTORY:  Chronic idiopathic pseudo-obstruction of intestine    Breast cancer    HLD (hyperlipidemia)    GERD (gastroesophageal reflux disease)    Osteoporosis    S/P small bowel resection      FAMILY HISTORY:  No family history of diarrheal illness in recent past      SOCIAL HISTORY:  Denied smoking    Allergies  penicillins (Unknown)  sulfa drugs (Hives)        ANTIMICROBIALS:  cefepime   IVPB    cefepime   IVPB 2000 every 12 hours  metroNIDAZOLE  IVPB    metroNIDAZOLE  IVPB 500 every 8 hours      ANTIMICROBIALS (past 90 days):   MEDICATIONS  (STANDING):  cefepime   IVPB   100 mL/Hr IV Intermittent (23 @ 23:50)    cefepime   IVPB   100 mL/Hr IV Intermittent (23 @ 08:59)    vancomycin    Solution   500 milliGRAM(s) Oral (23 @ 06:19)   500 milliGRAM(s) Oral (23 @ 00:30)        MEDICATIONS  (STANDING):  aluminum hydroxide/magnesium hydroxide/simethicone Suspension 30 every 4 hours PRN  enoxaparin Injectable 40 every 24 hours  metoclopramide 10 four times a day  simvastatin 10 at bedtime      VITALS:  Vital Signs Last 24 Hrs  T(F): 97.8 (23 @ 05:30), Max: 103.5 (23 @ 22:35)  Vital Signs Last 24 Hrs  HR: 93 (23 @ 11:35) (85 - 113)  BP: 131/54 (23 @ 11:35) (119/57 - 143/56)  RR: 20 (23 @ 11:35)  SpO2: 95% (23 @ 11:35) (90% - 96%)  Wt(kg): --    PHYSICAL EXAM:  Constitutional: non-toxic, no distress  HEAD/EYES: anicteric, no conjunctival injection  ENT:  supple, no thrush  Cardiovascular:   +S1/S2  Respiratory:  +BS bilaterally  GI:  markedly distended, mild tenderness on deep palpation  :  no marquez, no CVA tenderness  Musculoskeletal:  no synovitis  Neurologic: awake and alert,  no focal findings  Skin:  no rash, no erythema, no phlebitis  Vascular: warm extremities  Psychiatric:  calm, cooperative    Labs:                        8.2    4.20  )-----------( 245      ( 2023 05:49 )             25.8         136  |  102  |  15  ----------------------------<  116<H>  3.5   |  22  |  1.02    Ca    7.2<L>      2023 05:49  Phos  3.8       Mg     1.3         TPro  4.9<L>  /  Alb  2.3<L>  /  TBili  0.6  /  DBili  x   /  AST  13  /  ALT  8<L>  /  AlkPhos  61      WBC Trend:  WBC Count: 4.20 (23 @ 05:49)  WBC Count: 4.99 (23 @ 22:02)  WBC Count: 5.91 (04-10-23 @ 07:15)  WBC Count: 7.55 (23 @ 07:06)    Auto Neutrophil #: 2.73 K/uL (23 @ 05:49)  Auto Neutrophil #: 3.99 K/uL (23 @ 22:02)  Band Neutrophils %: 8.7 % (23 @ 22:02)  Auto Neutrophil #: 5.50 K/uL (23 @ 07:15)  Auto Neutrophil #: 4.40 K/uL (23 @ 07:22)    Auto Eosinophil %: 0.5 % (23 @ 05:49)  Auto Eosinophil %: 0.9 % (23 @ 22:02)    Urinalysis Basic - ( 2023 09:13 )    Color: Yellow / Appearance: Clear / S.025 / pH: x  Gluc: x / Ketone: Negative  / Bili: Negative / Urobili: <2 mg/dL   Blood: x / Protein: Trace / Nitrite: Negative   Leuk Esterase: Negative / RBC: x / WBC x   Sq Epi: x / Non Sq Epi: x / Bacteria: x    MICROBIOLOGY:  Culture - Blood (collected 2023 11:50)  Source: .Blood Blood-Peripheral  Final Report:    No Growth Final    Culture - Blood (collected 2023 11:50)  Source: .Blood Blood-Arterial  Final Report:    No Growth Final    Culture - Urine (collected 2023 15:49)  Source: Clean Catch Clean Catch (Midstream)  Final Report:    <10,000 CFU/mL Normal Urogenital Ling    Culture - Blood (collected 2023 12:55)  Source: .Blood Blood-Venous  Final Report:    Growth in aerobic and anaerobic bottles: Escherichia coli    ***Blood Panel PCR results on this specimen are available    approximately 3 hours after the Gram stain result.***    Gram stain, PCR, and/or culture results may not always    correspond due to difference in methodologies.    ************************************************************    This PCR assay was performed by multiplex PCR. This    Assay tests for 66 bacterial and resistance gene targets.    Please refer to the [facility] test directory    at [email or web address] for details.  Organism: Blood Culture PCR  Escherichia coli  Organism: Escherichia coli    Sensitivities:      Method Type: GRETCHEN      -  Amikacin: R >32      -  Ampicillin: S <=8 These ampicillin results predict results for amoxicillin      -  Ampicillin/Sulbactam: S <=4/2 Enterobacter, Klebsiella aerogenes, Citrobacter, and Serratia may develop resistance during prolonged therapy (3-4 days)      -  Aztreonam: S <=4      -  Cefazolin: S <=2 Enterobacter, Klebsiella aerogenes, Citrobacter, and Serratia may develop resistance during prolonged therapy (3-4 days)      -  Cefepime: S <=2      -  Cefoxitin: S <=8      -  Ceftriaxone: S <=1 Enterobacter, Klebsiella aerogenes, Citrobacter, and Serratia may develop resistance during prolonged therapy      -  Ciprofloxacin: S <=0.25      -  Ertapenem: S <=0.5      -  Gentamicin: S <=2      -  Levofloxacin: S <=0.5      -  Meropenem: S <=1      -  Piperacillin/Tazobactam: S <=8      -  Tobramycin: S <=2      -  Trimethoprim/Sulfamethoxazole: S <=0.5/9.5  Organism: Blood Culture PCR    Sensitivities:      Method Type: PCR      -  Escherichia coli: Detec    Culture - Blood (collected 2023 12:40)  Source: .Blood Blood-Peripheral  Final Report:    Growth in aerobic and anaerobic bottles: Escherichia coli    See previous culture [id number]    COVID-19 PCR: NotDetec (23 @ 00:13)    RADIOLOGY:  imaging below personally reviewed    < from: CT Abdomen and Pelvis w/ IV Cont (23 @ 02:58) >  IMPRESSION:    No pulmonary embolus.    Interval increase in inflammatory fat stranding, trace free fluid, and   wall thickening of short segment of the left colon, which may represent   colitis.    Persistent dilatation of the second and third portion of the duodenum   with the transition point of the midline, of unclear etiology.    Interval improvement in small bowel dilatation.    --- End of Report ---    < end of copied text >   Patient is a 78y old  Female who presents with a chief complaint of diarrhea, fatigue (2023 03:41)    HPI:  77 YO F with hx of bladder surgery (age 15), intestinal dysmotility, SBO s/p exlap with intestinal pexy? (, no bowel resection), hysterectomy (), presenting with fatigue iso watery diarrhea. Pt reports since discharge she has been having bowel movements every hour and most recently has decreased to every 2 hours. Not related to food intake. Reports yellowish in color. Was taking miralax daily for years with last dose being yest. Reports she was on clindamycin 1 month ago after dental procedure. No recent changes in meds, no new foods, no sick contacts, no one at home with diarrhea. Denies recent travel. No nausea/vomiting, abd pain, tolerating diet well although she has decreased appetite since discharge. Denies CP, fever/chills, dysuria, hematuria, melena/hematochezia. Family also notes labored breathing with exertion for the past couple of days limiting her mobility.   Of note, was recently admitted for constipation with CT a/p with small and large bowel dilatation without obstructing mass, chronic intestinal pseudoobstruction, focal wall thickening. Was managed medically with NG tube, rectal decompression with malecot drain with course c/b high grade E. coli bacteremia likely 2/2 GI translocation s/p IV cefazolin until 4/10 with repeat cultures neg.   In the ED febrile to 100.5, received ofirmev and vanc/cefepime    (2023 03:41)     ID consulted for diarrhea and fever. No BMs so far today. Notes diarrhea initially began while she was still admitted last week. No N/V or abdominal pain.     currently feels better    REVIEW OF SYSTEMS  [  ] ROS unobtainable because:    [ x ] All other systems negative except as noted below    Constitutional:  [ ] fever [ ] chills  [ ] weight loss  [ ]night sweat  [ ]poor appetite/PO intake [ ]fatigue   Skin:  [ ] rash [ ] phlebitis	  Eyes: [ ] icterus [ ] pain  [ ] discharge	  ENMT: [ ] sore throat  [ ] thrush [ ] ulcers [ ] exudates [ ]anosmia  Respiratory: [ ] dyspnea [ ] hemoptysis [ ] cough [ ] sputum	  Cardiovascular:  [ ] chest pain [ ] palpitations [ ] edema	  Gastrointestinal:  [ ] nausea [ ] vomiting [ ] diarrhea [ ] constipation [ ] pain	  Genitourinary:  [ ] dysuria [ ] frequency [ ] hematuria [ ] discharge [ ] flank pain  [ ] incontinence  Musculoskeletal:  [ ] myalgias [ ] arthralgias [ ] arthritis  [ ] back pain  Neurological:  [ ] headache [ ] weakness [ ] seizures  [ ] confusion/altered mental status    prior hospital charts reviewed [V]  primary team notes reviewed [V]  other consultant notes reviewed [V]    PAST MEDICAL & SURGICAL HISTORY:  Chronic idiopathic pseudo-obstruction of intestine    Breast cancer    HLD (hyperlipidemia)    GERD (gastroesophageal reflux disease)    Osteoporosis    S/P small bowel resection      FAMILY HISTORY:  No family history of diarrheal illness in recent past      SOCIAL HISTORY:  Denied smoking    Allergies  penicillins (Unknown)  sulfa drugs (Hives)        ANTIMICROBIALS:  cefepime   IVPB    cefepime   IVPB 2000 every 12 hours  metroNIDAZOLE  IVPB    metroNIDAZOLE  IVPB 500 every 8 hours      ANTIMICROBIALS (past 90 days):   MEDICATIONS  (STANDING):  cefepime   IVPB   100 mL/Hr IV Intermittent (23 @ 23:50)    cefepime   IVPB   100 mL/Hr IV Intermittent (23 @ 08:59)    vancomycin    Solution   500 milliGRAM(s) Oral (23 @ 06:19)   500 milliGRAM(s) Oral (23 @ 00:30)        MEDICATIONS  (STANDING):  aluminum hydroxide/magnesium hydroxide/simethicone Suspension 30 every 4 hours PRN  enoxaparin Injectable 40 every 24 hours  metoclopramide 10 four times a day  simvastatin 10 at bedtime      VITALS:  Vital Signs Last 24 Hrs  T(F): 97.8 (23 @ 05:30), Max: 103.5 (23 @ 22:35)  Vital Signs Last 24 Hrs  HR: 93 (23 @ 11:35) (85 - 113)  BP: 131/54 (23 @ 11:35) (119/57 - 143/56)  RR: 20 (23 @ 11:35)  SpO2: 95% (23 @ 11:35) (90% - 96%)  Wt(kg): --    PHYSICAL EXAM:  Constitutional: non-toxic, no distress  HEAD/EYES: anicteric, no conjunctival injection  ENT:  supple, no thrush  Cardiovascular:   +S1/S2  Respiratory:  +BS bilaterally  GI:  markedly distended, mild tenderness on deep palpation  :  no marquez, no CVA tenderness  Musculoskeletal:  no synovitis  Neurologic: awake and alert,  no focal findings  Skin:  no rash, no erythema, no phlebitis  Vascular: warm extremities  Psychiatric:  calm, cooperative    Labs:                        8.2    4.20  )-----------( 245      ( 2023 05:49 )             25.8         136  |  102  |  15  ----------------------------<  116<H>  3.5   |  22  |  1.02    Ca    7.2<L>      2023 05:49  Phos  3.8       Mg     1.3         TPro  4.9<L>  /  Alb  2.3<L>  /  TBili  0.6  /  DBili  x   /  AST  13  /  ALT  8<L>  /  AlkPhos  61      WBC Trend:  WBC Count: 4.20 (23 @ 05:49)  WBC Count: 4.99 (23 @ 22:02)  WBC Count: 5.91 (04-10-23 @ 07:15)  WBC Count: 7.55 (23 @ 07:06)    Auto Neutrophil #: 2.73 K/uL (23 @ 05:49)  Auto Neutrophil #: 3.99 K/uL (23 @ 22:02)  Band Neutrophils %: 8.7 % (23 @ 22:02)  Auto Neutrophil #: 5.50 K/uL (23 @ 07:15)  Auto Neutrophil #: 4.40 K/uL (23 @ 07:22)    Auto Eosinophil %: 0.5 % (23 @ 05:49)  Auto Eosinophil %: 0.9 % (23 @ 22:02)    Urinalysis Basic - ( 2023 09:13 )    Color: Yellow / Appearance: Clear / S.025 / pH: x  Gluc: x / Ketone: Negative  / Bili: Negative / Urobili: <2 mg/dL   Blood: x / Protein: Trace / Nitrite: Negative   Leuk Esterase: Negative / RBC: x / WBC x   Sq Epi: x / Non Sq Epi: x / Bacteria: x    MICROBIOLOGY:  Culture - Blood (collected 2023 11:50)  Source: .Blood Blood-Peripheral  Final Report:    No Growth Final    Culture - Blood (collected 2023 11:50)  Source: .Blood Blood-Arterial  Final Report:    No Growth Final    Culture - Urine (collected 2023 15:49)  Source: Clean Catch Clean Catch (Midstream)  Final Report:    <10,000 CFU/mL Normal Urogenital Ling    Culture - Blood (collected 2023 12:55)  Source: .Blood Blood-Venous  Organism: Escherichia coli    Sensitivities:      Method Type: GRETCHEN      -  Amikacin: R >32      -  Ampicillin: S <=8 These ampicillin results predict results for amoxicillin      -  Ampicillin/Sulbactam: S <=4/2 Enterobacter, Klebsiella aerogenes, Citrobacter, and Serratia may develop resistance during prolonged therapy (3-4 days)      -  Aztreonam: S <=4      -  Cefazolin: S <=2 Enterobacter, Klebsiella aerogenes, Citrobacter, and Serratia may develop resistance during prolonged therapy (3-4 days)      -  Cefepime: S <=2      -  Cefoxitin: S <=8      -  Ceftriaxone: S <=1 Enterobacter, Klebsiella aerogenes, Citrobacter, and Serratia may develop resistance during prolonged therapy      -  Ciprofloxacin: S <=0.25      -  Ertapenem: S <=0.5      -  Gentamicin: S <=2      -  Levofloxacin: S <=0.5      -  Meropenem: S <=1      -  Piperacillin/Tazobactam: S <=8      -  Tobramycin: S <=2      -  Trimethoprim/Sulfamethoxazole: S <=0.5/9.5  Organism: Blood Culture PCR    Sensitivities:      Method Type: PCR      -  Escherichia coli: Detec    Culture - Blood (collected 2023 12:40)  Source: .Blood Blood-Peripheral  Final Report:    Growth in aerobic and anaerobic bottles: Escherichia coli    See previous culture [id number]    COVID-19 PCR: NotDetec (23 @ 00:13)    RADIOLOGY:  imaging below personally reviewed    < from: CT Abdomen and Pelvis w/ IV Cont (23 @ 02:58) >  IMPRESSION:    No pulmonary embolus.    Interval increase in inflammatory fat stranding, trace free fluid, and   wall thickening of short segment of the left colon, which may represent   colitis.    Persistent dilatation of the second and third portion of the duodenum   with the transition point of the midline, of unclear etiology.    Interval improvement in small bowel dilatation.    --- End of Report ---    < end of copied text >

## 2023-04-13 NOTE — H&P ADULT - NSHPREVIEWOFSYSTEMS_GEN_ALL_CORE
CONSTITUTIONAL: No weakness, fevers or chills  EYES/ENT: No visual changes;  No vertigo or throat pain   NECK: No pain or stiffness  RESPIRATORY: No cough, wheezing, hemoptysis; No shortness of breath  CARDIOVASCULAR: No chest pain or palpitations  GASTROINTESTINAL:  GENITOURINARY: No dysuria, frequency or hematuria  NEUROLOGICAL: No numbness or weakness  SKIN: No itching, burning, rashes, or lesions   PSYCH: no hx depression, no hx anxiety

## 2023-04-13 NOTE — ED ADULT NURSE REASSESSMENT NOTE - NS ED NURSE REASSESS COMMENT FT1
flagyl held as per pt request, pt states medication "makes her very sick". SALO Perez aware. YAQUELIN room reports pt had run of SALO ALCAZAR aware, EKG strip placed in pt chart.
pt is awake and alert, resting comfortably in stretcher, speaking in full coherent sentences. Pt endorses no pain or discomfort at this time. Call bell within reach, comfort & safety provided.

## 2023-04-13 NOTE — H&P ADULT - ATTENDING COMMENTS
79 YO F with hx of bladder surgery (age 15), intestinal dysmotility, SBO s/p exlap with intestinal pexy? (1984, no bowel resection), hysterectomy (2005), recent admission for abdominal pain found to have diffuse small and large bowel dilatation c/w intestinal pseudoobstruction, treated w/ NGT and rectal decompression, with hospital course complicated by E coli bacteremia likely from GI translocation s/p IV abx, now presenting to the ED w/ watery diarrhea. Admitted to medicine w/ diarrhea and sepsis.    #sepsis  #diarrhea  #intestinal pseudoobstruction    - CT A/P: Persistent dilated loops of large and small bowel which are somewhat improved compared to prior from 4.2.23  - c/w cefepime and flagyl for now given penicillin allergy  - c/w po vancomycin  - f/u stool studies and C diff  - c/w IVF  - trend fever curve  - aggressively replete lytes  - consider ID consult (following previous admission)    Agree with rest of plan as per resident note.  Discussed w/ resident Dr. Derick Curry MD, Hospitalist  Department of Internal Medicine  Pager: [phone]  Email: [email or web address] 79 YO F with hx of bladder surgery (age 15), intestinal dysmotility, SBO s/p exlap with intestinal pexy? (1984, no bowel resection), hysterectomy (2005), recent admission for abdominal pain found to have diffuse small and large bowel dilatation c/w intestinal pseudoobstruction, treated w/ NGT and rectal decompression, with hospital course complicated by E coli bacteremia likely from GI translocation s/p IV abx, now presenting to the ED w/ watery diarrhea. Admitted to medicine w/ diarrhea and sepsis.    #sepsis  #diarrhea  #hx intestinal pseudoobstruction    - CT A/P: Persistent dilated loops of large and small bowel which are somewhat improved compared to prior from 4.2.23  - c/w cefepime and flagyl for now given penicillin allergy  - c/w po vancomycin  - f/u stool studies and C diff  - f/u blood cultures  - c/w IVF  - trend fever curve  - serial abdominal exams  - aggressively replete lytes  - consider ID consult (following previous admission)    Agree with rest of plan as per resident note.  Discussed w/ resident Dr. Derick Curry MD, Hospitalist  Department of Internal Medicine  Pager: [phone]  Email: [email or web address] 77 YO F with hx of bladder surgery (age 15), intestinal dysmotility, SBO s/p exlap with intestinal pexy? (1984, no bowel resection), hysterectomy (2005), recent admission for abdominal pain found to have diffuse small and large bowel dilatation c/w intestinal pseudoobstruction, treated w/ NGT and rectal decompression, with hospital course complicated by E coli bacteremia likely from GI translocation s/p IV abx, now presenting to the ED w/ watery diarrhea. Admitted to medicine w/ diarrhea and sepsis.    #sepsis  #diarrhea  #hx intestinal pseudoobstruction    - CT A/P: Persistent dilated loops of large and small bowel which are somewhat improved compared to prior from 4.2.23  - c/w cefepime and flagyl for now given penicillin allergy  - c/w po vancomycin  - f/u stool studies and C diff  - f/u blood cultures  - c/w IVF  - trend fever curve  - serial abdominal exams  - aggressively replete lytes  - GI consult emailed  - consider ID consult (following previous admission)    Agree with rest of plan as per resident note.  Discussed w/ resident Dr. Derick Curry MD, Hospitalist  Department of Internal Medicine  Pager: [phone]  Email: [email or web address] 77 YO F with hx of bladder surgery (age 15), intestinal dysmotility, SBO s/p exlap with intestinal pexy? (1984, no bowel resection), hysterectomy (2005), recent admission for abdominal pain found to have diffuse small and large bowel dilatation c/w intestinal pseudoobstruction, treated w/ NGT and rectal decompression, with hospital course complicated by E coli bacteremia likely from GI translocation s/p IV abx, now presenting to the ED w/ watery diarrhea. Admitted to medicine w/ diarrhea and sepsis.    #sepsis  #diarrhea  #hx intestinal pseudoobstruction    - CT A/P: Persistent dilated loops of large and small bowel which are somewhat improved compared to prior from 4.2.23  - c/w cefepime and flagyl for now given penicillin allergy  - c/w po vancomycin  - f/u stool studies and C diff  - f/u blood cultures  - c/w IVF  - trend fever curve  - serial abdominal exams  - aggressively replete lytes  - GI consult in AM (Dr. Zepeda following prior admission  - consider ID consult (following previous admission)    Agree with rest of plan as per resident note.  Discussed w/ resident Dr. Derick Curry MD, Hospitalist  Department of Internal Medicine  Pager: [phone]  Email: [email or web address] 79 YO F with hx of bladder surgery (age 15), intestinal dysmotility, SBO s/p exlap with intestinal pexy? (1984, no bowel resection), hysterectomy (2005), recent admission for abdominal pain found to have diffuse small and large bowel dilatation c/w intestinal pseudoobstruction, treated w/ NGT and rectal decompression, with hospital course complicated by E coli bacteremia likely from GI translocation s/p IV abx, now presenting to the ED w/ watery diarrhea. Admitted to medicine w/ diarrhea and sepsis.    #sepsis  #diarrhea  #hx intestinal pseudoobstruction    - CT A/P: Persistent dilated loops of large and small bowel which are somewhat improved compared to prior from 4.2.23  - c/w cefepime and flagyl for now given penicillin allergy  - c/w po vancomycin  - f/u stool studies and C diff  - f/u blood cultures  - c/w IVF  - trend fever curve  - serial abdominal exams  - aggressively replete lytes  - f/u bladder scan w/ PVR due to distended bladder on CT  - GI consult in AM (Dr. Zepeda following prior admission  - consider ID consult (following previous admission)    Agree with rest of plan as per resident note.  Discussed w/ resident Dr. eDrick Curry MD, Hospitalist  Department of Internal Medicine  Pager: [phone]  Email: [email or web address]

## 2023-04-13 NOTE — H&P ADULT - PROBLEM SELECTOR PLAN 5
on famotidine and pepcid at home    - c/w famotidine  - hold pepcid given diarrhea on pepcid at home    - c/w famotidine  - hold pepcid given diarrhea Noted to have lipase with no abd pain     - monitor abd exam Noted to have lipase with no abd pain   low suspicion for pancreatitis     - monitor abd exam  - f/u final abd exam

## 2023-04-13 NOTE — H&P ADULT - NSHPSOCIALHISTORY_GEN_ALL_CORE
Lives alone. Was previously independent with all ADL's however now requires family assistance. Remote smoking hx. No recreational drug use. Previous social drinker

## 2023-04-13 NOTE — PATIENT PROFILE ADULT - FALL HARM RISK - HARM RISK INTERVENTIONS

## 2023-04-13 NOTE — H&P ADULT - PROBLEM SELECTOR PLAN 1
Tmax of 103 with tachycardia on arrival with diarrhea at home  s/p 1L fluid in the ED     - maintenance fluids   - f/u blood cultures  - f/u GI PCR, stool ova and parasites   - f/u C. diff  - monitor fever curve  - monitor WBC Tmax of 103 with tachycardia on arrival with diarrhea at home  s/p 1L fluid in the ED   blood cx from prior admission     - c/w cefepime and vanc   - maintenance fluids   - f/u blood cultures  - f/u GI PCR, stool ova and parasites   - f/u C. diff   - monitor fever curve  - monitor WBC Tmax of 103 with tachycardia on arrival with diarrhea at home  s/p 1L fluid in the ED   blood cx from prior admission     - c/w cefepime 2g q8h and flagyl 500mg q8h (penicillin allergy)  - c/w po vanc 125mg q6h  for now, DC if cdiff negative  - maintenance fluids   - f/u blood cultures  - f/u GI PCR, stool ova and parasites   - f/u C. diff   - monitor fever curve  - monitor WBC

## 2023-04-13 NOTE — CONSULT NOTE ADULT - ATTENDING COMMENTS
78F with multiple prior abdominal surgeries, chronic intestinal pseudoobstruction, and recent admission for E.coli bacteremia (s/p 7d course of IV abx with Cefepime, Ceftriaxone, Cefazolin 4/3 --> 4/10/23, source suspected GI in setting of pseudoobstruction, discharged home 4/10), readmitted 4/13/23 with generalized weakness and diarrhea.   Patient notes her diarrhea initially began during last admission, test C. Diff negative (4/7) during that admission. In ED, found to have fever 103.5. CT showing L colon fat stranding and wall thickening concerning for focal left sided colitis.     Impression:  #Diarrhea, Colitis - improving, no BMs thus far today. Patient did take Miralax at home, last dose 4/11. Overall low suspicion for C. Diff given only focal colitis on CT.  #Fever, Sepsis - suspect GI source in setting of colitis, will need to r/o recurrent bacteremia from gut translocation    Suggest  - continue with cefepime 2g IV q12H 4/12 -->    decrease metronidazole to 500mg q12H 4/13-->  - D/C PO Vancomycin, low suspicion for C. Diff  - check GI PCR  - f/u blood cultures

## 2023-04-14 DIAGNOSIS — K52.9 NONINFECTIVE GASTROENTERITIS AND COLITIS, UNSPECIFIED: ICD-10-CM

## 2023-04-14 LAB
ANION GAP SERPL CALC-SCNC: 13 MMOL/L — SIGNIFICANT CHANGE UP (ref 5–17)
BUN SERPL-MCNC: 12 MG/DL — SIGNIFICANT CHANGE UP (ref 7–23)
C DIFF GDH STL QL: NEGATIVE — SIGNIFICANT CHANGE UP
C DIFF GDH STL QL: SIGNIFICANT CHANGE UP
CALCIUM SERPL-MCNC: 7.7 MG/DL — LOW (ref 8.4–10.5)
CHLORIDE SERPL-SCNC: 101 MMOL/L — SIGNIFICANT CHANGE UP (ref 96–108)
CO2 SERPL-SCNC: 20 MMOL/L — LOW (ref 22–31)
CREAT SERPL-MCNC: 0.91 MG/DL — SIGNIFICANT CHANGE UP (ref 0.5–1.3)
CULTURE RESULTS: NO GROWTH — SIGNIFICANT CHANGE UP
CULTURE RESULTS: SIGNIFICANT CHANGE UP
EGFR: 64 ML/MIN/1.73M2 — SIGNIFICANT CHANGE UP
GLUCOSE SERPL-MCNC: 116 MG/DL — HIGH (ref 70–99)
HCT VFR BLD CALC: 27.2 % — LOW (ref 34.5–45)
HGB BLD-MCNC: 8.8 G/DL — LOW (ref 11.5–15.5)
MCHC RBC-ENTMCNC: 28.2 PG — SIGNIFICANT CHANGE UP (ref 27–34)
MCHC RBC-ENTMCNC: 32.4 GM/DL — SIGNIFICANT CHANGE UP (ref 32–36)
MCV RBC AUTO: 87.2 FL — SIGNIFICANT CHANGE UP (ref 80–100)
NRBC # BLD: 0 /100 WBCS — SIGNIFICANT CHANGE UP (ref 0–0)
PLATELET # BLD AUTO: 293 K/UL — SIGNIFICANT CHANGE UP (ref 150–400)
POTASSIUM SERPL-MCNC: 3.3 MMOL/L — LOW (ref 3.5–5.3)
POTASSIUM SERPL-SCNC: 3.3 MMOL/L — LOW (ref 3.5–5.3)
RBC # BLD: 3.12 M/UL — LOW (ref 3.8–5.2)
RBC # FLD: 14.6 % — HIGH (ref 10.3–14.5)
SODIUM SERPL-SCNC: 134 MMOL/L — LOW (ref 135–145)
SPECIMEN SOURCE: SIGNIFICANT CHANGE UP
SPECIMEN SOURCE: SIGNIFICANT CHANGE UP
WBC # BLD: 4.8 K/UL — SIGNIFICANT CHANGE UP (ref 3.8–10.5)
WBC # FLD AUTO: 4.8 K/UL — SIGNIFICANT CHANGE UP (ref 3.8–10.5)

## 2023-04-14 PROCEDURE — 99232 SBSQ HOSP IP/OBS MODERATE 35: CPT

## 2023-04-14 PROCEDURE — 99233 SBSQ HOSP IP/OBS HIGH 50: CPT

## 2023-04-14 RX ORDER — PIPERACILLIN AND TAZOBACTAM 4; .5 G/20ML; G/20ML
3.38 INJECTION, POWDER, LYOPHILIZED, FOR SOLUTION INTRAVENOUS EVERY 8 HOURS
Refills: 0 | Status: DISCONTINUED | OUTPATIENT
Start: 2023-04-14 | End: 2023-04-18

## 2023-04-14 RX ORDER — POTASSIUM CHLORIDE 20 MEQ
40 PACKET (EA) ORAL ONCE
Refills: 0 | Status: COMPLETED | OUTPATIENT
Start: 2023-04-14 | End: 2023-04-14

## 2023-04-14 RX ORDER — PIPERACILLIN AND TAZOBACTAM 4; .5 G/20ML; G/20ML
3.38 INJECTION, POWDER, LYOPHILIZED, FOR SOLUTION INTRAVENOUS ONCE
Refills: 0 | Status: COMPLETED | OUTPATIENT
Start: 2023-04-14 | End: 2023-04-14

## 2023-04-14 RX ORDER — SIMETHICONE 80 MG/1
80 TABLET, CHEWABLE ORAL THREE TIMES A DAY
Refills: 0 | Status: DISCONTINUED | OUTPATIENT
Start: 2023-04-14 | End: 2023-04-16

## 2023-04-14 RX ORDER — CHOLECALCIFEROL (VITAMIN D3) 125 MCG
1000 CAPSULE ORAL DAILY
Refills: 0 | Status: DISCONTINUED | OUTPATIENT
Start: 2023-04-14 | End: 2023-04-19

## 2023-04-14 RX ADMIN — SIMVASTATIN 10 MILLIGRAM(S): 20 TABLET, FILM COATED ORAL at 21:44

## 2023-04-14 RX ADMIN — Medication 10 MILLIGRAM(S): at 06:17

## 2023-04-14 RX ADMIN — PIPERACILLIN AND TAZOBACTAM 25 GRAM(S): 4; .5 INJECTION, POWDER, LYOPHILIZED, FOR SOLUTION INTRAVENOUS at 18:03

## 2023-04-14 RX ADMIN — PIPERACILLIN AND TAZOBACTAM 200 GRAM(S): 4; .5 INJECTION, POWDER, LYOPHILIZED, FOR SOLUTION INTRAVENOUS at 14:16

## 2023-04-14 RX ADMIN — Medication 40 MILLIEQUIVALENT(S): at 11:28

## 2023-04-14 RX ADMIN — CEFEPIME 100 MILLIGRAM(S): 1 INJECTION, POWDER, FOR SOLUTION INTRAMUSCULAR; INTRAVENOUS at 06:15

## 2023-04-14 RX ADMIN — SIMETHICONE 80 MILLIGRAM(S): 80 TABLET, CHEWABLE ORAL at 18:32

## 2023-04-14 RX ADMIN — ENOXAPARIN SODIUM 40 MILLIGRAM(S): 100 INJECTION SUBCUTANEOUS at 18:26

## 2023-04-14 RX ADMIN — Medication 10 MILLIGRAM(S): at 11:28

## 2023-04-14 NOTE — CONSULT NOTE ADULT - SUBJECTIVE AND OBJECTIVE BOX
Chief Complaint:  Patient is a 78y old  Female who presents with a chief complaint of diarrhea, fatigue (2023 12:06)      Date of service: 23 @ 14:00    HPI:    The patient is a 78 year old female with history of bladder surgery, intestinal dysmotility, SBO s/p exlap with intestinal pexy? (, no bowel resection), hysterectomy (), presenting with fatigue in setting of watery diarrhea. Pt reports since discharge she has been having bowel movements every hour and most recently has decreased to every 2 hours. Not related to food intake. Reports yellowish in color. Was taking miralax daily for years with last dose being yest. Reports she was on clindamycin 1 month ago after dental procedure. No recent changes in meds, no new foods, no sick contacts, no one at home with diarrhea. Denies recent travel. No nausea/vomiting, abd pain, tolerating diet well although she has decreased appetite since discharge. Denies CP, fever/chills, dysuria, hematuria, melena/hematochezia. Family also notes labored breathing with exertion for the past couple of days limiting her mobility.     Of note, was recently admitted for constipation with CT a/p with small and large bowel dilatation without obstructing mass, chronic intestinal pseudoobstruction, focal wall thickening. Was managed medically with NG tube, rectal decompression with malecot drain with course c/b high grade E. coli bacteremia likely 2/2 GI translocation s/p IV cefazolin until 4/10 with repeat cultures neg.     In the ED febrile to 100.5, received ofirmev and vanc/cefepime       Allergies:  penicillins (Unknown)  sulfa drugs (Hives)      Home Medications:    Hospital Medications:  aluminum hydroxide/magnesium hydroxide/simethicone Suspension 30 milliLiter(s) Oral every 4 hours PRN  enoxaparin Injectable 40 milliGRAM(s) SubCutaneous every 24 hours  lactated ringers. 1000 milliLiter(s) IV Continuous <Continuous>  metoclopramide 10 milliGRAM(s) Oral four times a day  piperacillin/tazobactam IVPB. 3.375 Gram(s) IV Intermittent once  piperacillin/tazobactam IVPB.- 3.375 Gram(s) IV Intermittent once  piperacillin/tazobactam IVPB.. 3.375 Gram(s) IV Intermittent every 8 hours  simethicone 80 milliGRAM(s) Chew three times a day PRN  simvastatin 10 milliGRAM(s) Oral at bedtime      PMHX/PSHX:  Chronic idiopathic pseudo-obstruction of intestine    Breast cancer    HLD (hyperlipidemia)    GERD (gastroesophageal reflux disease)    Osteoporosis    S/P small bowel resection        Family history:      Social History:   Denies ethanol use.  Denies illicit drug use.    ROS:     General:  No wt loss, fevers, chills, night sweats, fatigue,   Eyes:  Good vision, no reported pain  ENT:  No sore throat, pain, runny nose, dysphagia  CV:  No pain, palpitations, hypo/hypertension  Resp:  No dyspnea, cough, tachypnea, wheezing  GI:  See HPI  :  No pain, bleeding, incontinence, nocturia  Muscle:  No pain, weakness  Neuro:  No weakness, tingling, memory problems  Psych:  No fatigue, insomnia, mood problems, depression  Endocrine:  No polyuria, polydipsia, cold/heat intolerance  Heme:  No petechiae, ecchymosis, easy bruisability  Integumentary:  No rash, edema      PHYSICAL EXAM:     GENERAL:  Appears stated age, well-groomed, well-nourished, no distress  HEENT:  NC/AT,  conjunctivae anicteric, clear and pink,   NECK: supple, trachea midline  CHEST:  Full & symmetric excursion, no increased effort, breath sounds clear  HEART:  Regular rhythm, no JVD  ABDOMEN:  Soft, non-tender, non-distended, normoactive bowel sounds,  no masses , no hepatosplenomegaly  EXTREMITIES:  no cyanosis,clubbing or edema  SKIN:  No rash, erythema, or, ecchymoses, no jaundice  NEURO:  Alert, non-focal, no asterixis  PSYCH: Appropriate affect, oriented to place and time  RECTAL: Deferred      Vital Signs:  Vital Signs Last 24 Hrs  T(C): 37.5 (2023 05:08), Max: 37.5 (2023 05:08)  T(F): 99.5 (2023 05:08), Max: 99.5 (2023 05:08)  HR: 99 (2023 05:08) (74 - 99)  BP: 129/64 (2023 05:08) (128/58 - 134/75)  BP(mean): --  RR: 18 (2023 05:08) (18 - 22)  SpO2: 92% (2023 05:08) (92% - 96%)    Parameters below as of 2023 05:08  Patient On (Oxygen Delivery Method): nasal cannula      Daily     Daily     LABS: Labs personally reviewed by me:                        [email or web address]    4.80  )-----------( 293      ( 2023 07:01 )             27.2     04-14    134<L>  |  101  |  12  ----------------------------<  116<H>  3.3<L>   |  20<L>  |  0.91    Ca    7.7<L>      2023 07:00  Phos  3.8     04-13  Mg     1.3     04-13    TPro  4.9<L>  /  Alb  2.3<L>  /  TBili  0.6  /  DBili  x   /  AST  13  /  ALT  8<L>  /  AlkPhos  61  04-13    LIVER FUNCTIONS - ( 2023 05:49 )  Alb: 2.3 g/dL / Pro: 4.9 g/dL / ALK PHOS: 61 U/L / ALT: 8 U/L / AST: 13 U/L / GGT: x             Urinalysis Basic - ( 2023 09:13 )    Color: Yellow / Appearance: Clear / S.025 / pH: x  Gluc: x / Ketone: Negative  / Bili: Negative / Urobili: <2 mg/dL   Blood: x / Protein: Trace / Nitrite: Negative   Leuk Esterase: Negative / RBC: x / WBC x   Sq Epi: x / Non Sq Epi: x / Bacteria: x          Imaging personally reviewed by me:           Chief Complaint:  Patient is a 78y old  Female who presents with a chief complaint of diarrhea, fatigue (2023 12:06)      Date of service: 23 @ 14:00    HPI:    The patient is a 78 year old female with history of bladder surgery, intestinal dysmotility, SBO s/p exlap with intestinal pexy? (, no bowel resection), hysterectomy (), presenting with fatigue in setting of watery diarrhea. Patient recently admitted for acute on chronic pseudoobstruction, complicated by GNR bacteremia likely 2/2 GI translocation. Managed with NGT and rectal decompression. Completed 7 day course of cefazolin IV. Patient states since discharge she has been having frequent watery, non-bloody diarrhea. Reports 1 episode every 2 hours. Patient on Reglan at home for colonic dysmotility. Also has been on Miralax daily for years. Her outpatient GI is Dr. Roscoe Gordon. Patient denies any changes in medications, sick contacts or recent travel. Denies nausea, vomiting, abdominal pain, dysphagia, melena, brbpr. Reports decreased appetite. Patient reports fevers at home.       Allergies:  penicillins (Unknown)  sulfa drugs (Hives)      Home Medications:    Hospital Medications:  aluminum hydroxide/magnesium hydroxide/simethicone Suspension 30 milliLiter(s) Oral every 4 hours PRN  enoxaparin Injectable 40 milliGRAM(s) SubCutaneous every 24 hours  lactated ringers. 1000 milliLiter(s) IV Continuous <Continuous>  metoclopramide 10 milliGRAM(s) Oral four times a day  piperacillin/tazobactam IVPB. 3.375 Gram(s) IV Intermittent once  piperacillin/tazobactam IVPB.- 3.375 Gram(s) IV Intermittent once  piperacillin/tazobactam IVPB.. 3.375 Gram(s) IV Intermittent every 8 hours  simethicone 80 milliGRAM(s) Chew three times a day PRN  simvastatin 10 milliGRAM(s) Oral at bedtime      PMHX/PSHX:  Chronic idiopathic pseudo-obstruction of intestine    Breast cancer    HLD (hyperlipidemia)    GERD (gastroesophageal reflux disease)    Osteoporosis    S/P small bowel resection        Family history:      Social History:   Denies ethanol use.  Denies illicit drug use.    ROS:     General:  No wt loss, fevers, chills, night sweats, fatigue,   Eyes:  Good vision, no reported pain  ENT:  No sore throat, pain, runny nose, dysphagia  CV:  No pain, palpitations, hypo/hypertension  Resp:  No dyspnea, cough, tachypnea, wheezing  GI:  See HPI  :  No pain, bleeding, incontinence, nocturia  Muscle:  No pain, weakness  Neuro:  No weakness, tingling, memory problems  Psych:  No fatigue, insomnia, mood problems, depression  Endocrine:  No polyuria, polydipsia, cold/heat intolerance  Heme:  No petechiae, ecchymosis, easy bruisability  Integumentary:  No rash, edema      PHYSICAL EXAM:     GENERAL:  Appears stated age, well-groomed, well-nourished, no distress  HEENT:  NC/AT,  conjunctivae anicteric, clear and pink,   NECK: supple, trachea midline  CHEST:  Full & symmetric excursion, no increased effort, breath sounds clear  HEART:  Regular rhythm, no JVD  ABDOMEN:  Soft, non-tender, +distended, normoactive bowel sounds,  no masses , no hepatosplenomegaly  EXTREMITIES:  no cyanosis,clubbing or edema  SKIN:  No rash, erythema, or, ecchymoses, no jaundice  NEURO:  Alert, non-focal, no asterixis  PSYCH: Appropriate affect, oriented to place and time  RECTAL: Deferred      Vital Signs:  Vital Signs Last 24 Hrs  T(C): 37.5 (2023 05:08), Max: 37.5 (2023 05:08)  T(F): 99.5 (2023 05:08), Max: 99.5 (2023 05:08)  HR: 99 (2023 05:08) (74 - 99)  BP: 129/64 (2023 05:08) (128/58 - 134/75)  BP(mean): --  RR: 18 (2023 05:08) (18 - 22)  SpO2: 92% (2023 05:08) (92% - 96%)    Parameters below as of 2023 05:08  Patient On (Oxygen Delivery Method): nasal cannula      Daily     Daily     LABS: Labs personally reviewed by me:                        8.8    4.80  )-----------( 293      ( 2023 07:01 )             27.2     04-14    134<L>  |  101  |  12  ----------------------------<  116<H>  3.3<L>   |  20<L>  |  0.91    Ca    7.7<L>      2023 07:00  Phos  3.8       Mg     1.3         TPro  4.9<L>  /  Alb  2.3<L>  /  TBili  0.6  /  DBili  x   /  AST  13  /  ALT  8<L>  /  AlkPhos  61  13    LIVER FUNCTIONS - ( 2023 05:49 )  Alb: 2.3 g/dL / Pro: 4.9 g/dL / ALK PHOS: 61 U/L / ALT: 8 U/L / AST: 13 U/L / GGT: x             Urinalysis Basic - ( 2023 09:13 )    Color: Yellow / Appearance: Clear / S.025 / pH: x  Gluc: x / Ketone: Negative  / Bili: Negative / Urobili: <2 mg/dL   Blood: x / Protein: Trace / Nitrite: Negative   Leuk Esterase: Negative / RBC: x / WBC x   Sq Epi: x / Non Sq Epi: x / Bacteria: x          Imaging personally reviewed by me:

## 2023-04-14 NOTE — CONSULT NOTE ADULT - ASSESSMENT
78 year old female with history of bladder surgery, intestinal dysmotility, SBO s/p exlap with intestinal pexy? (1984, no bowel resection), hysterectomy (2005), presenting with fatigue in setting of watery diarrhea.    1. Diarrhea/ colitis   - CT abdomen/ pelvis reveals interval increase in inflammatory fat stranding, trace free fluid, and wall thickening of short segment of the left colon, which may represent   colitis. Interval improvement in small bowel dilatation.  - GI PCR, O&P negative, stool culture pending  - cw IVF and electrolyte repletion   - check stool for c-diff as she continues to have frequent watery stools   - consider discontinuing Reglan as is a promotility agent and may be exacerbating the diarrhea    2. Sepsis  - hx of e-coli bacteremia  - BC x 2 NGTD  - on Zosyn   - per ID    3. Hx of colonic dysmotility    4. Elevated lipase  - no abdominal pain, low suspicion for pancreatitis           Advanced care planning forms were discussed. Code status including forceful chest compressions, defibrillation and intubation were discussed. The risks benefits and alternatives to pertinent gastrointestinal procedures and interventions were discussed in detail and all questions were answered. Duration: 15 Minutes.    Attending supervision statement: I have personally seen and examined the patient. I fully participated in the care of this patient. I have made amendments to the documentation where necessary, and agree with the history, physical exam, and plan as outlined by the [facility].    [facility]  Gastroenterology and Hepatology  [address] [place] [id number]  Office: [phone]  Cell: [phone] 78 year old female with history of bladder surgery, intestinal dysmotility, SBO s/p exlap with intestinal pexy? (1984, no bowel resection), hysterectomy (2005), presenting with fatigue in setting of watery diarrhea.    1. Diarrhea/ colitis - recent antibiotic use   - CT abdomen/ pelvis reveals interval increase in inflammatory fat stranding, trace free fluid, and wall thickening of short segment of the left colon, which may represent   colitis. Interval improvement in small bowel dilatation.  - GI PCR, O&P negative, stool culture pending  - cw IVF and electrolyte repletion   - check stool for c-diff as she continues to have frequent watery stools   - consider discontinuing Reglan as is a promotility agent and may be exacerbating the diarrhea    2. Sepsis  - hx of e-coli bacteremia, completed 7 day course of IV cefazolin  - BC x 2 NGTD  - on Zosyn   - per ID    3. Hx of colonic dysmotility    4. Elevated lipase  - no abdominal pain, low suspicion for pancreatitis           Advanced care planning forms were discussed. Code status including forceful chest compressions, defibrillation and intubation were discussed. The risks benefits and alternatives to pertinent gastrointestinal procedures and interventions were discussed in detail and all questions were answered. Duration: 15 Minutes.    Attending supervision statement: I have personally seen and examined the patient. I fully participated in the care of this patient. I have made amendments to the documentation where necessary, and agree with the history, physical exam, and plan as outlined by the [facility].    [facility]  Gastroenterology and Hepatology  [address] [place] [id number]  Office: [phone]  Cell: [phone]

## 2023-04-15 LAB
ANION GAP SERPL CALC-SCNC: 10 MMOL/L — SIGNIFICANT CHANGE UP (ref 5–17)
ANION GAP SERPL CALC-SCNC: 13 MMOL/L — SIGNIFICANT CHANGE UP (ref 5–17)
BASOPHILS # BLD AUTO: 0.02 K/UL — SIGNIFICANT CHANGE UP (ref 0–0.2)
BASOPHILS NFR BLD AUTO: 0.5 % — SIGNIFICANT CHANGE UP (ref 0–2)
BUN SERPL-MCNC: 10 MG/DL — SIGNIFICANT CHANGE UP (ref 7–23)
BUN SERPL-MCNC: 11 MG/DL — SIGNIFICANT CHANGE UP (ref 7–23)
CALCIUM SERPL-MCNC: 7.4 MG/DL — LOW (ref 8.4–10.5)
CALCIUM SERPL-MCNC: 7.5 MG/DL — LOW (ref 8.4–10.5)
CHLORIDE SERPL-SCNC: 100 MMOL/L — SIGNIFICANT CHANGE UP (ref 96–108)
CHLORIDE SERPL-SCNC: 103 MMOL/L — SIGNIFICANT CHANGE UP (ref 96–108)
CO2 SERPL-SCNC: 20 MMOL/L — LOW (ref 22–31)
CO2 SERPL-SCNC: 21 MMOL/L — LOW (ref 22–31)
CREAT SERPL-MCNC: 0.9 MG/DL — SIGNIFICANT CHANGE UP (ref 0.5–1.3)
CREAT SERPL-MCNC: 0.97 MG/DL — SIGNIFICANT CHANGE UP (ref 0.5–1.3)
CULTURE RESULTS: SIGNIFICANT CHANGE UP
EGFR: 59 ML/MIN/1.73M2 — LOW
EGFR: 65 ML/MIN/1.73M2 — SIGNIFICANT CHANGE UP
EOSINOPHIL # BLD AUTO: 0.04 K/UL — SIGNIFICANT CHANGE UP (ref 0–0.5)
EOSINOPHIL NFR BLD AUTO: 0.9 % — SIGNIFICANT CHANGE UP (ref 0–6)
GLUCOSE SERPL-MCNC: 117 MG/DL — HIGH (ref 70–99)
GLUCOSE SERPL-MCNC: 133 MG/DL — HIGH (ref 70–99)
HCT VFR BLD CALC: 24.3 % — LOW (ref 34.5–45)
HGB BLD-MCNC: 7.9 G/DL — LOW (ref 11.5–15.5)
IMM GRANULOCYTES NFR BLD AUTO: 0.9 % — SIGNIFICANT CHANGE UP (ref 0–0.9)
LYMPHOCYTES # BLD AUTO: 0.79 K/UL — LOW (ref 1–3.3)
LYMPHOCYTES # BLD AUTO: 18.7 % — SIGNIFICANT CHANGE UP (ref 13–44)
MAGNESIUM SERPL-MCNC: 1.4 MG/DL — LOW (ref 1.6–2.6)
MAGNESIUM SERPL-MCNC: 1.7 MG/DL — SIGNIFICANT CHANGE UP (ref 1.6–2.6)
MCHC RBC-ENTMCNC: 28.3 PG — SIGNIFICANT CHANGE UP (ref 27–34)
MCHC RBC-ENTMCNC: 32.5 GM/DL — SIGNIFICANT CHANGE UP (ref 32–36)
MCV RBC AUTO: 87.1 FL — SIGNIFICANT CHANGE UP (ref 80–100)
MONOCYTES # BLD AUTO: 0.45 K/UL — SIGNIFICANT CHANGE UP (ref 0–0.9)
MONOCYTES NFR BLD AUTO: 10.7 % — SIGNIFICANT CHANGE UP (ref 2–14)
NEUTROPHILS # BLD AUTO: 2.88 K/UL — SIGNIFICANT CHANGE UP (ref 1.8–7.4)
NEUTROPHILS NFR BLD AUTO: 68.3 % — SIGNIFICANT CHANGE UP (ref 43–77)
NRBC # BLD: 0 /100 WBCS — SIGNIFICANT CHANGE UP (ref 0–0)
PHOSPHATE SERPL-MCNC: 1.9 MG/DL — LOW (ref 2.5–4.5)
PHOSPHATE SERPL-MCNC: 2.3 MG/DL — LOW (ref 2.5–4.5)
PLATELET # BLD AUTO: 275 K/UL — SIGNIFICANT CHANGE UP (ref 150–400)
POTASSIUM SERPL-MCNC: 3.1 MMOL/L — LOW (ref 3.5–5.3)
POTASSIUM SERPL-MCNC: 3.8 MMOL/L — SIGNIFICANT CHANGE UP (ref 3.5–5.3)
POTASSIUM SERPL-SCNC: 3.1 MMOL/L — LOW (ref 3.5–5.3)
POTASSIUM SERPL-SCNC: 3.8 MMOL/L — SIGNIFICANT CHANGE UP (ref 3.5–5.3)
RBC # BLD: 2.79 M/UL — LOW (ref 3.8–5.2)
RBC # FLD: 14.5 % — SIGNIFICANT CHANGE UP (ref 10.3–14.5)
SODIUM SERPL-SCNC: 133 MMOL/L — LOW (ref 135–145)
SODIUM SERPL-SCNC: 134 MMOL/L — LOW (ref 135–145)
SPECIMEN SOURCE: SIGNIFICANT CHANGE UP
WBC # BLD: 4.22 K/UL — SIGNIFICANT CHANGE UP (ref 3.8–10.5)
WBC # FLD AUTO: 4.22 K/UL — SIGNIFICANT CHANGE UP (ref 3.8–10.5)

## 2023-04-15 PROCEDURE — 99232 SBSQ HOSP IP/OBS MODERATE 35: CPT

## 2023-04-15 RX ORDER — POTASSIUM CHLORIDE 20 MEQ
40 PACKET (EA) ORAL ONCE
Refills: 0 | Status: COMPLETED | OUTPATIENT
Start: 2023-04-15 | End: 2023-04-15

## 2023-04-15 RX ORDER — SODIUM,POTASSIUM PHOSPHATES 278-250MG
1 POWDER IN PACKET (EA) ORAL ONCE
Refills: 0 | Status: COMPLETED | OUTPATIENT
Start: 2023-04-15 | End: 2023-04-15

## 2023-04-15 RX ORDER — POTASSIUM PHOSPHATE, MONOBASIC POTASSIUM PHOSPHATE, DIBASIC 236; 224 MG/ML; MG/ML
15 INJECTION, SOLUTION INTRAVENOUS ONCE
Refills: 0 | Status: COMPLETED | OUTPATIENT
Start: 2023-04-15 | End: 2023-04-15

## 2023-04-15 RX ORDER — MAGNESIUM SULFATE 500 MG/ML
1 VIAL (ML) INJECTION ONCE
Refills: 0 | Status: COMPLETED | OUTPATIENT
Start: 2023-04-15 | End: 2023-04-15

## 2023-04-15 RX ORDER — ACETAMINOPHEN 500 MG
650 TABLET ORAL EVERY 6 HOURS
Refills: 0 | Status: DISCONTINUED | OUTPATIENT
Start: 2023-04-15 | End: 2023-04-19

## 2023-04-15 RX ADMIN — Medication 1000 UNIT(S): at 11:45

## 2023-04-15 RX ADMIN — PIPERACILLIN AND TAZOBACTAM 25 GRAM(S): 4; .5 INJECTION, POWDER, LYOPHILIZED, FOR SOLUTION INTRAVENOUS at 01:36

## 2023-04-15 RX ADMIN — Medication 1 PACKET(S): at 10:12

## 2023-04-15 RX ADMIN — SIMETHICONE 80 MILLIGRAM(S): 80 TABLET, CHEWABLE ORAL at 19:43

## 2023-04-15 RX ADMIN — Medication 100 GRAM(S): at 21:33

## 2023-04-15 RX ADMIN — PIPERACILLIN AND TAZOBACTAM 25 GRAM(S): 4; .5 INJECTION, POWDER, LYOPHILIZED, FOR SOLUTION INTRAVENOUS at 21:33

## 2023-04-15 RX ADMIN — Medication 40 MILLIEQUIVALENT(S): at 10:12

## 2023-04-15 RX ADMIN — Medication 650 MILLIGRAM(S): at 20:42

## 2023-04-15 RX ADMIN — POTASSIUM PHOSPHATE, MONOBASIC POTASSIUM PHOSPHATE, DIBASIC 62.5 MILLIMOLE(S): 236; 224 INJECTION, SOLUTION INTRAVENOUS at 23:57

## 2023-04-15 RX ADMIN — ENOXAPARIN SODIUM 40 MILLIGRAM(S): 100 INJECTION SUBCUTANEOUS at 18:30

## 2023-04-15 RX ADMIN — Medication 650 MILLIGRAM(S): at 19:42

## 2023-04-15 RX ADMIN — PIPERACILLIN AND TAZOBACTAM 25 GRAM(S): 4; .5 INJECTION, POWDER, LYOPHILIZED, FOR SOLUTION INTRAVENOUS at 13:09

## 2023-04-15 RX ADMIN — SIMVASTATIN 10 MILLIGRAM(S): 20 TABLET, FILM COATED ORAL at 21:33

## 2023-04-15 RX ADMIN — PIPERACILLIN AND TAZOBACTAM 25 GRAM(S): 4; .5 INJECTION, POWDER, LYOPHILIZED, FOR SOLUTION INTRAVENOUS at 11:45

## 2023-04-15 RX ADMIN — Medication 100 GRAM(S): at 10:13

## 2023-04-16 ENCOUNTER — TRANSCRIPTION ENCOUNTER (OUTPATIENT)
Age: 78
End: 2023-04-16

## 2023-04-16 LAB
ANION GAP SERPL CALC-SCNC: 11 MMOL/L — SIGNIFICANT CHANGE UP (ref 5–17)
BUN SERPL-MCNC: 10 MG/DL — SIGNIFICANT CHANGE UP (ref 7–23)
CALCIUM SERPL-MCNC: 7.7 MG/DL — LOW (ref 8.4–10.5)
CHLORIDE SERPL-SCNC: 106 MMOL/L — SIGNIFICANT CHANGE UP (ref 96–108)
CO2 SERPL-SCNC: 20 MMOL/L — LOW (ref 22–31)
CREAT SERPL-MCNC: 0.82 MG/DL — SIGNIFICANT CHANGE UP (ref 0.5–1.3)
EGFR: 73 ML/MIN/1.73M2 — SIGNIFICANT CHANGE UP
GLUCOSE SERPL-MCNC: 114 MG/DL — HIGH (ref 70–99)
HCT VFR BLD CALC: 23 % — LOW (ref 34.5–45)
HGB BLD-MCNC: 7.4 G/DL — LOW (ref 11.5–15.5)
MAGNESIUM SERPL-MCNC: 2 MG/DL — SIGNIFICANT CHANGE UP (ref 1.6–2.6)
MCHC RBC-ENTMCNC: 28.5 PG — SIGNIFICANT CHANGE UP (ref 27–34)
MCHC RBC-ENTMCNC: 32.2 GM/DL — SIGNIFICANT CHANGE UP (ref 32–36)
MCV RBC AUTO: 88.5 FL — SIGNIFICANT CHANGE UP (ref 80–100)
NRBC # BLD: 0 /100 WBCS — SIGNIFICANT CHANGE UP (ref 0–0)
PHOSPHATE SERPL-MCNC: 3.5 MG/DL — SIGNIFICANT CHANGE UP (ref 2.5–4.5)
PLATELET # BLD AUTO: 311 K/UL — SIGNIFICANT CHANGE UP (ref 150–400)
POTASSIUM SERPL-MCNC: 3.9 MMOL/L — SIGNIFICANT CHANGE UP (ref 3.5–5.3)
POTASSIUM SERPL-SCNC: 3.9 MMOL/L — SIGNIFICANT CHANGE UP (ref 3.5–5.3)
RBC # BLD: 2.6 M/UL — LOW (ref 3.8–5.2)
RBC # FLD: 14.5 % — SIGNIFICANT CHANGE UP (ref 10.3–14.5)
SODIUM SERPL-SCNC: 137 MMOL/L — SIGNIFICANT CHANGE UP (ref 135–145)
WBC # BLD: 3.21 K/UL — LOW (ref 3.8–10.5)
WBC # FLD AUTO: 3.21 K/UL — LOW (ref 3.8–10.5)

## 2023-04-16 PROCEDURE — 99232 SBSQ HOSP IP/OBS MODERATE 35: CPT

## 2023-04-16 RX ORDER — SIMETHICONE 80 MG/1
80 TABLET, CHEWABLE ORAL DAILY
Refills: 0 | Status: DISCONTINUED | OUTPATIENT
Start: 2023-04-16 | End: 2023-04-17

## 2023-04-16 RX ORDER — LANOLIN ALCOHOL/MO/W.PET/CERES
3 CREAM (GRAM) TOPICAL ONCE
Refills: 0 | Status: COMPLETED | OUTPATIENT
Start: 2023-04-16 | End: 2023-04-16

## 2023-04-16 RX ADMIN — PIPERACILLIN AND TAZOBACTAM 25 GRAM(S): 4; .5 INJECTION, POWDER, LYOPHILIZED, FOR SOLUTION INTRAVENOUS at 05:15

## 2023-04-16 RX ADMIN — ENOXAPARIN SODIUM 40 MILLIGRAM(S): 100 INJECTION SUBCUTANEOUS at 17:48

## 2023-04-16 RX ADMIN — PIPERACILLIN AND TAZOBACTAM 25 GRAM(S): 4; .5 INJECTION, POWDER, LYOPHILIZED, FOR SOLUTION INTRAVENOUS at 22:06

## 2023-04-16 RX ADMIN — Medication 1000 UNIT(S): at 11:40

## 2023-04-16 RX ADMIN — PIPERACILLIN AND TAZOBACTAM 25 GRAM(S): 4; .5 INJECTION, POWDER, LYOPHILIZED, FOR SOLUTION INTRAVENOUS at 13:42

## 2023-04-16 RX ADMIN — Medication 3 MILLIGRAM(S): at 22:40

## 2023-04-16 RX ADMIN — SIMETHICONE 80 MILLIGRAM(S): 80 TABLET, CHEWABLE ORAL at 17:48

## 2023-04-16 RX ADMIN — SIMVASTATIN 10 MILLIGRAM(S): 20 TABLET, FILM COATED ORAL at 22:06

## 2023-04-16 NOTE — DISCHARGE NOTE PROVIDER - HOSPITAL COURSE
79 YO F with hx of bladder surgery (age 15), intestinal dysmotility, SBO s/p exlap with intestinal pexy? (1984, no bowel resection), hysterectomy (2005) admitted with sepsis 2/2 diarrhea i/s/o recent antibiotic use          Problem/Plan - 1:  ·  Problem: Sepsis.   ·  Plan: Tmax of 103 with tachycardia on arrival with diarrhea at home  - s/p 1L fluid in the ED   - blood cx from prior admission   - was on cefepime 2g q8h and flagyl 500mg q8h (penicillin allergy); patient has been refusing flagyl due to side effect in the past; per ID, will change to zosyn for now; will monitor for any allergic reactions while on zosyn  - continue zosyn till 4/17 per ID recs   - no longer on PO vanc 125mg q6h per ID; likely all related to colitis infection   - c diff sent; negative   - blood cultures from 4/12 neg  - GI PCR neg; f/u stool cx    - monitor fever curve  - monitor WBC  - f/u ID and GI recs.     Problem/Plan - 2:  ·  Problem: Colitis.   ·  Plan: - plan as above  - f/u ID and GI recs  - hold reglan for now per GI recs.     Problem/Plan - 3:  ·  Problem: Diarrhea.   ·  Plan: reportedly has also been using miralax at home  - GI PCR neg, f/u stool cx    - c diff neg    - blood cultures neg 4/12   - no bowel regimen.     Problem/Plan - 4:  ·  Problem: Electrolyte abnormality.   ·  Plan: Hypokalemia with hypomagnesemia i/s/o diarrhea s/p repletion in the ED  - Monitor electrolytes and replete PRN.     Problem/Plan - 5:  ·  Problem: Dyspnea.   ·  Plan: Reported dyspnea at home   currently on 2LNC  likely related to deconditioning   CT Chest Angio: neg for PE. Trace bilateral effusions and bibasilar atelectasis.  - PT c/s  - wean O2 as tolerated  - incentive spirometer.     Problem/Plan - 6:  ·  Problem: Elevated lipase.   ·  Plan: Noted to have lipase with no abd pain   low suspicion for pancreatitis   - monitor abd exam.     Problem/Plan - 7:  ·  Problem: GERD (gastroesophageal reflux disease).   ·  Plan: on pepcid at home  - hold pepcid given diarrhea.     Problem/Plan - 8:  ·  Problem: Colonic dysmotility.   ·  Plan: hold reglan in setting of watery diarrhea.    Discharge planning discussed with attending Dr. Fletcher. Patient is medically cleared and stable for discharge. Medication Reconciliation reviewed with attending. Follow up outpatient with your PCP.   77 YO F with hx of bladder surgery (age 15), intestinal dysmotility, SBO s/p exlap with intestinal pexy? (1984, no bowel resection), hysterectomy (2005) admitted with sepsis 2/2 diarrhea i/s/o recent antibiotic use          Problem/Plan - 1:  ·  Problem: Sepsis.   ·  Plan: Tmax of 103 with tachycardia on arrival with diarrhea at home  - s/p 1L fluid in the ED   - blood cx from prior admission   - was on cefepime 2g q8h and flagyl 500mg q8h (penicillin allergy); patient has been refusing flagyl due to side effect in the past; per ID, will change to zosyn for now; will monitor for any allergic reactions while on zosyn  - continue zosyn till 4/17 per ID recs   - no longer on PO vanc 125mg q6h per ID; likely all related to colitis infection   - c diff sent; negative   - blood cultures from 4/12 neg  - GI PCR neg; f/u stool cx    - monitor fever curve  - monitor WBC  - f/u ID and GI recs.     Problem/Plan - 2:  ·  Problem: Colitis.   ·  Plan: - plan as above  - f/u ID and GI recs  - hold reglan for now per GI recs.     Problem/Plan - 3:  ·  Problem: Diarrhea.   ·  Plan: reportedly has also been using miralax at home  - GI PCR neg, f/u stool cx    - c diff neg    - blood cultures neg 4/12   - no bowel regimen.     Problem/Plan - 4:  ·  Problem: Electrolyte abnormality.   ·  Plan: Hypokalemia with hypomagnesemia i/s/o diarrhea s/p repletion in the ED  - Monitor electrolytes and replete PRN.     Problem/Plan - 5:  ·  Problem: Dyspnea.   ·  Plan: Reported dyspnea at home   currently on 2LNC, now on RA  likely related to deconditioning   CT Chest Angio: neg for PE. Trace bilateral effusions and bibasilar atelectasis.  - PT c/s  - incentive spirometer.     Problem/Plan - 6:  ·  Problem: Elevated lipase.   ·  Plan: Noted to have lipase with no abd pain   low suspicion for pancreatitis   - monitor abd exam.     Problem/Plan - 7:  ·  Problem: GERD (gastroesophageal reflux disease).   ·  Plan: on pepcid at home  - hold pepcid given diarrhea.     Problem/Plan - 8:  ·  Problem: Colonic dysmotility.   ·  Plan: hold reglan in setting of watery diarrhea.  - resume reglan on discharge     Discharge planning discussed with attending Dr. Bowen. Patient is medically cleared and stable for discharge. Medication Reconciliation reviewed with attending. Follow up outpatient with your PCP Dr. Draper and with GI Dr. Gordon.

## 2023-04-16 NOTE — DISCHARGE NOTE PROVIDER - NSDCCPCAREPLAN_GEN_ALL_CORE_FT
PRINCIPAL DISCHARGE DIAGNOSIS  Diagnosis: Sepsis  Assessment and Plan of Treatment: You completed a course of zosyn antibiotic while hospitalized.  Call you Health care provider upon arrival home to make a one week follow up appointment.  If you develop fever, chills, malaise, or change in mental status call your Health Care Provider or go to the Emergency Department.  Nutrition is important, eat small frequent meals to help ensure you get adequate calories.  Do not stay in bed all day!  Increase your activity daily as tolerated.      SECONDARY DISCHARGE DIAGNOSES  Diagnosis: Diarrhea  Assessment and Plan of Treatment: Diarrhea describes bowel movements that are runny or watery, and happen 3 or more times in a day. Causes of diarrhea are viruses, bacteria, parasites (tiny worms that you can catch in some countries, some medicines problems digesting certain types of food and diseases that harm the digestive system. Drink a lot of liquids that have water, salt, and sugar. Good choices are water mixed with juice, flavored soda, and soup broth. If you are drinking enough fluids, your urine will be light yellow or almost clear. Try to eat a little food. Good choices are potatoes, noodles, rice, oatmeal, crackers, bananas, soup, and boiled vegetables. Salty foods help the most.  You will become dehydrated if you have too many episodes of diarrhea that is very watery.   Symptoms of dehydration: Feeling very tired, thirst, dry mouth or tongue, muscle cramps, dizziness, confusion, urine that is very yellow, or not needing to urinate for more than 5 hours  Treatment include antibiotics, medicine that ease diarrhea, stopping some of your medicines, changing the foods you eat ,washing your hands after a bowel movement, cooking, eating, taking out the trash, touching animals, and blowing your nose.  Tips to food safety include not drinking unpasteurized milk or foods made with it, washing fruits and vegetables well before eating them, keeping the refrigerator colder than 40ºF and the freezer below 0ºF,  cooking meat and seafood until well done, cooking eggs until the yolk is firm  Washing hands, knives, and cutting boards after they touch raw food     PRINCIPAL DISCHARGE DIAGNOSIS  Diagnosis: Sepsis  Assessment and Plan of Treatment: Follow up with your PCP Dr. Draper early next week.   You completed a course of zosyn antibiotic while hospitalized.  Call you Health care provider upon arrival home to make a one week follow up appointment.  If you develop fever, chills, malaise, or change in mental status call your Health Care Provider or go to the Emergency Department.  Nutrition is important, eat small frequent meals to help ensure you get adequate calories.  Do not stay in bed all day!  Increase your activity daily as tolerated.      SECONDARY DISCHARGE DIAGNOSES  Diagnosis: Colitis  Assessment and Plan of Treatment: Follow up with Dr. Gordon within 2 weeks.  HOME CARE INSTRUCTIONS  Get plenty of rest.  Drink enough water and fluids to keep your urine clear or pale yellow.  Eat a well-balanced diet.  Call your caregiver for follow-up as recommended.  SEEK IMMEDIATE MEDICAL CARE IF:  You develop chills.  You have an oral temperature above _____________________, not controlled by medicine.  You have extreme weakness, fainting, or dehydration.  You have repeated vomiting.  You develop severe belly (abdominal) pain or are passing bloody or tarry stools      Diagnosis: Diarrhea  Assessment and Plan of Treatment: Follow up with Dr. Gordon within 2 weeks.  Diarrhea describes bowel movements that are runny or watery, and happen 3 or more times in a day. Causes of diarrhea are viruses, bacteria, parasites (tiny worms that you can catch in some countries, some medicines problems digesting certain types of food and diseases that harm the digestive system. Drink a lot of liquids that have water, salt, and sugar. Good choices are water mixed with juice, flavored soda, and soup broth. If you are drinking enough fluids, your urine will be light yellow or almost clear. Try to eat a little food. Good choices are potatoes, noodles, rice, oatmeal, crackers, bananas, soup, and boiled vegetables. Salty foods help the most.  You will become dehydrated if you have too many episodes of diarrhea that is very watery.   Symptoms of dehydration: Feeling very tired, thirst, dry mouth or tongue, muscle cramps, dizziness, confusion, urine that is very yellow, or not needing to urinate for more than 5 hours  Treatment include antibiotics, medicine that ease diarrhea, stopping some of your medicines, changing the foods you eat ,washing your hands after a bowel movement, cooking, eating, taking out the trash, touching animals, and blowing your nose.  Tips to food safety include not drinking unpasteurized milk or foods made with it, washing fruits and vegetables well before eating them, keeping the refrigerator colder than 40ºF and the freezer below 0ºF,  cooking meat and seafood until well done, cooking eggs until the yolk is firm  Washing hands, knives, and cutting boards after they touch raw food     PRINCIPAL DISCHARGE DIAGNOSIS  Diagnosis: Sepsis  Assessment and Plan of Treatment: Follow up with your PCP Dr. Draper early next week.   You completed a course of zosyn antibiotic while hospitalized.  Call you Health care provider upon arrival home to make a one week follow up appointment.  If you develop fever, chills, malaise, or change in mental status call your Health Care Provider or go to the Emergency Department.  Nutrition is important, eat small frequent meals to help ensure you get adequate calories.  Do not stay in bed all day!  Increase your activity daily as tolerated.      SECONDARY DISCHARGE DIAGNOSES  Diagnosis: Diarrhea  Assessment and Plan of Treatment: Follow up with Dr. Gordon within 2 weeks.  Diarrhea describes bowel movements that are runny or watery, and happen 3 or more times in a day. Causes of diarrhea are viruses, bacteria, parasites (tiny worms that you can catch in some countries, some medicines problems digesting certain types of food and diseases that harm the digestive system. Drink a lot of liquids that have water, salt, and sugar. Good choices are water mixed with juice, flavored soda, and soup broth. If you are drinking enough fluids, your urine will be light yellow or almost clear. Try to eat a little food. Good choices are potatoes, noodles, rice, oatmeal, crackers, bananas, soup, and boiled vegetables. Salty foods help the most.  You will become dehydrated if you have too many episodes of diarrhea that is very watery.   Symptoms of dehydration: Feeling very tired, thirst, dry mouth or tongue, muscle cramps, dizziness, confusion, urine that is very yellow, or not needing to urinate for more than 5 hours  Treatment include antibiotics, medicine that ease diarrhea, stopping some of your medicines, changing the foods you eat ,washing your hands after a bowel movement, cooking, eating, taking out the trash, touching animals, and blowing your nose.  Tips to food safety include not drinking unpasteurized milk or foods made with it, washing fruits and vegetables well before eating them, keeping the refrigerator colder than 40ºF and the freezer below 0ºF,  cooking meat and seafood until well done, cooking eggs until the yolk is firm  Washing hands, knives, and cutting boards after they touch raw food    Diagnosis: Colitis  Assessment and Plan of Treatment: Follow up with Dr. Gordon within 2 weeks.  HOME CARE INSTRUCTIONS  Get plenty of rest.  Drink enough water and fluids to keep your urine clear or pale yellow.  Eat a well-balanced diet.  Call your caregiver for follow-up as recommended.  SEEK IMMEDIATE MEDICAL CARE IF:  You develop chills.  You have an oral temperature above _____________________, not controlled by medicine.  You have extreme weakness, fainting, or dehydration.  You have repeated vomiting.  You develop severe belly (abdominal) pain or are passing bloody or tarry stools      Diagnosis: Anemia of chronic disease  Assessment and Plan of Treatment: You're hemoglobin was found to decrease from early april through now. You do not have signs of malignancy on imaging and no evidence of blood loss. Your iron studies are consistent with anemia of chronic disease. You were also phlebotomized during the hospital stays which also likely impacing your hemoglobin. You should f/u with Dr. Draper for further management and evaluation of your blood counts- you may require a hematology referral. If you experience shortness of breath on exertion, chest pain or palpitations, faintness or severe fatigue, you should return to emergency room or urgent care for evaluation.

## 2023-04-16 NOTE — DISCHARGE NOTE PROVIDER - NSDCFUADDAPPT_GEN_ALL_CORE_FT
APPTS ARE READY TO BE MADE: [x ] YES    Best Family or Patient Contact (if needed):    Additional Information about above appointments (if needed):    1:   2:   3:     Other comments or requests:    APPTS ARE READY TO BE MADE: [x ] YES    Best Family or Patient Contact (if needed):    Additional Information about above appointments (if needed):    1:   2:   3:     Other comments or requests:   Patient was provided with follow up request details for Dr. Clay Katz and Dr. Roscoe Gordon. Patient was advised to call to schedule follow up within specified time frame.

## 2023-04-16 NOTE — DISCHARGE NOTE PROVIDER - PROVIDER TOKENS
PROVIDER:[TOKEN:[[id number]],FOLLOWUP:[1 week],ESTABLISHEDPATIENT:[T]] PROVIDER:[TOKEN:[[id number]],FOLLOWUP:[1 week],ESTABLISHEDPATIENT:[T]],PROVIDER:[TOKEN:[[id number]],FOLLOWUP:[2 weeks],ESTABLISHEDPATIENT:[T]]

## 2023-04-16 NOTE — DISCHARGE NOTE PROVIDER - NSDCMRMEDTOKEN_GEN_ALL_CORE_FT
aluminum hydroxide-magnesium hydroxide 200 mg-200 mg/5 mL oral suspension: 30 milliliter(s) orally every 4 hours As needed Dyspepsia  Cytotec 200 mcg oral tablet: 1 orally 2 times a day  ergocalciferol 1.25 mg (50,000 intl units) oral tablet: 1 tab(s) orally 2 times a week  Hiprex 1 g oral tablet: 1 orally once a day  MiraLax oral powder for reconstitution: 17 gram(s) orally once a day  Prolia 60 mg/mL subcutaneous solution: 60 milligram(s) subcutaneously  Protonix 40 mg oral delayed release tablet: 1 tab(s) orally once a day  Reglan 10 mg oral tablet: 1 tab(s) orally 4 times a day  simvastatin 10 mg oral tablet: 1 tab(s) orally once a day (at bedtime)   Cytotec 200 mcg oral tablet: 1 orally 2 times a day  ergocalciferol 1.25 mg (50,000 intl units) oral tablet: 1 tab(s) orally 2 times a week  Hiprex 1 g oral tablet: 1 orally once a day  MiraLax oral powder for reconstitution: 17 gram(s) orally once a day  Prolia 60 mg/mL subcutaneous solution: 60 milligram(s) subcutaneously  Reglan 10 mg oral tablet: 1 tab(s) orally 4 times a day  simethicone 80 mg oral tablet, chewable: 1 tab(s) orally 2 times a day Take over the counter Gas-x as needed for gas relief  simvastatin 10 mg oral tablet: 1 tab(s) orally once a day (at bedtime)

## 2023-04-16 NOTE — DISCHARGE NOTE PROVIDER - CARE PROVIDER_API CALL
Clay Draper  CARDIOVASCULAR DISEASE  [address], [address]  [place] [id number]  Phone: [phone]  Fax: [phone]  Established Patient  Follow Up Time: 1 week   Clay Draper  CARDIOVASCULAR DISEASE  [address], [address]  [place] [address]  Phone: [phone]  Fax: [phone]  Established Patient  Follow Up Time: 1 week    Roscoe Gordon  GASTROENTEROLOGY  [address], [address]  [place] [address]  Phone: [phone]  Fax: [phone]  Established Patient  Follow Up Time: 2 weeks

## 2023-04-17 LAB
ANION GAP SERPL CALC-SCNC: 10 MMOL/L — SIGNIFICANT CHANGE UP (ref 5–17)
BUN SERPL-MCNC: 7 MG/DL — SIGNIFICANT CHANGE UP (ref 7–23)
CALCIUM SERPL-MCNC: 8.1 MG/DL — LOW (ref 8.4–10.5)
CHLORIDE SERPL-SCNC: 101 MMOL/L — SIGNIFICANT CHANGE UP (ref 96–108)
CO2 SERPL-SCNC: 23 MMOL/L — SIGNIFICANT CHANGE UP (ref 22–31)
CREAT SERPL-MCNC: 0.75 MG/DL — SIGNIFICANT CHANGE UP (ref 0.5–1.3)
EGFR: 81 ML/MIN/1.73M2 — SIGNIFICANT CHANGE UP
GLUCOSE SERPL-MCNC: 112 MG/DL — HIGH (ref 70–99)
HCT VFR BLD CALC: 23 % — LOW (ref 34.5–45)
HGB BLD-MCNC: 7.5 G/DL — LOW (ref 11.5–15.5)
MAGNESIUM SERPL-MCNC: 1.6 MG/DL — SIGNIFICANT CHANGE UP (ref 1.6–2.6)
MCHC RBC-ENTMCNC: 28.5 PG — SIGNIFICANT CHANGE UP (ref 27–34)
MCHC RBC-ENTMCNC: 32.6 GM/DL — SIGNIFICANT CHANGE UP (ref 32–36)
MCV RBC AUTO: 87.5 FL — SIGNIFICANT CHANGE UP (ref 80–100)
NRBC # BLD: 0 /100 WBCS — SIGNIFICANT CHANGE UP (ref 0–0)
PHOSPHATE SERPL-MCNC: 2.7 MG/DL — SIGNIFICANT CHANGE UP (ref 2.5–4.5)
PLATELET # BLD AUTO: 361 K/UL — SIGNIFICANT CHANGE UP (ref 150–400)
POTASSIUM SERPL-MCNC: 3.5 MMOL/L — SIGNIFICANT CHANGE UP (ref 3.5–5.3)
POTASSIUM SERPL-SCNC: 3.5 MMOL/L — SIGNIFICANT CHANGE UP (ref 3.5–5.3)
RBC # BLD: 2.63 M/UL — LOW (ref 3.8–5.2)
RBC # FLD: 14.5 % — SIGNIFICANT CHANGE UP (ref 10.3–14.5)
SODIUM SERPL-SCNC: 134 MMOL/L — LOW (ref 135–145)
WBC # BLD: 3.96 K/UL — SIGNIFICANT CHANGE UP (ref 3.8–10.5)
WBC # FLD AUTO: 3.96 K/UL — SIGNIFICANT CHANGE UP (ref 3.8–10.5)

## 2023-04-17 PROCEDURE — 99232 SBSQ HOSP IP/OBS MODERATE 35: CPT

## 2023-04-17 PROCEDURE — 99233 SBSQ HOSP IP/OBS HIGH 50: CPT

## 2023-04-17 RX ORDER — SIMETHICONE 80 MG/1
80 TABLET, CHEWABLE ORAL
Refills: 0 | Status: DISCONTINUED | OUTPATIENT
Start: 2023-04-17 | End: 2023-04-19

## 2023-04-17 RX ORDER — SIMETHICONE 80 MG/1
80 TABLET, CHEWABLE ORAL ONCE
Refills: 0 | Status: COMPLETED | OUTPATIENT
Start: 2023-04-17 | End: 2023-04-17

## 2023-04-17 RX ORDER — POLYETHYLENE GLYCOL 3350 17 G/17G
17 POWDER, FOR SOLUTION ORAL ONCE
Refills: 0 | Status: COMPLETED | OUTPATIENT
Start: 2023-04-17 | End: 2023-04-17

## 2023-04-17 RX ADMIN — PIPERACILLIN AND TAZOBACTAM 25 GRAM(S): 4; .5 INJECTION, POWDER, LYOPHILIZED, FOR SOLUTION INTRAVENOUS at 13:37

## 2023-04-17 RX ADMIN — SIMVASTATIN 10 MILLIGRAM(S): 20 TABLET, FILM COATED ORAL at 21:28

## 2023-04-17 RX ADMIN — PIPERACILLIN AND TAZOBACTAM 25 GRAM(S): 4; .5 INJECTION, POWDER, LYOPHILIZED, FOR SOLUTION INTRAVENOUS at 05:09

## 2023-04-17 RX ADMIN — ENOXAPARIN SODIUM 40 MILLIGRAM(S): 100 INJECTION SUBCUTANEOUS at 17:49

## 2023-04-17 RX ADMIN — Medication 1000 UNIT(S): at 12:50

## 2023-04-17 RX ADMIN — SIMETHICONE 80 MILLIGRAM(S): 80 TABLET, CHEWABLE ORAL at 17:48

## 2023-04-17 RX ADMIN — SIMETHICONE 80 MILLIGRAM(S): 80 TABLET, CHEWABLE ORAL at 14:15

## 2023-04-17 RX ADMIN — PIPERACILLIN AND TAZOBACTAM 25 GRAM(S): 4; .5 INJECTION, POWDER, LYOPHILIZED, FOR SOLUTION INTRAVENOUS at 21:28

## 2023-04-17 RX ADMIN — POLYETHYLENE GLYCOL 3350 17 GRAM(S): 17 POWDER, FOR SOLUTION ORAL at 14:12

## 2023-04-17 NOTE — PHYSICAL THERAPY INITIAL EVALUATION ADULT - WEIGHT-BEARING RESTRICTIONS: GAIT, REHAB EVAL
No change in medications  Check blood work.  Okay to do Monday with other labs  Follow up in 3 months full weight-bearing

## 2023-04-17 NOTE — PHYSICAL THERAPY INITIAL EVALUATION ADULT - GENERAL OBSERVATIONS, REHAB EVAL
Rec'd Rec'd sitting in bedside chair, NAD, MARICRUZ, ERIN/Ox4, +IVL, daughter present, agreeable to PT.

## 2023-04-17 NOTE — PHYSICAL THERAPY INITIAL EVALUATION ADULT - PERTINENT HX OF CURRENT PROBLEM, REHAB EVAL
77 YO F with hx of bladder surgery (age 15), intestinal dysmotility, SBO s/p exlap with intestinal pexy? 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 Pt reports since discharge she has been having bowel movements every hour and most recently has decreased to every 2 hours. Not related to food intake. Reports yellowish in color. Was taking miralax daily for years with last dose being yest. Reports she was on clindamycin 1 month ago after dental procedure. No recent changes in meds, no new foods, no sick contacts, no one at home with diarrhea. Denies recent travel. No nausea/vomiting, abd pain, tolerating diet well although she has decreased appetite since discharge. Denies CP, fever/chills, dysuria, hematuria, melena/hematochezia. Family also notes labored breathing with exertion for the past couple of days limiting her mobility. Of note, was recently admitted for constipation with CT a/p with small and large bowel dilatation without obstructing mass, chronic intestinal pseudoobstruction, focal wall thickening. Was managed medically with NG tube, rectal decompression with malecot drain with course c/b high grade E. coli bacteremia likely 2/2 GI translocation s/p IV cefazolin until 4/10 with repeat cultures neg. In the ED febrile to 100.5, received ofirmev and vanc/cefepime.  4/13 CT A/P-CTA Chest: No pulmonary embolus. Interval increase in inflammatory fat stranding, trace free fluid, and wall thickening of short segment of the left colon, which may represent colitis. Persistent dilatation of the second and third portion of the duodenum   with the transition point of the midline, of unclear etiology. Interval improvement in small bowel dilatation.

## 2023-04-17 NOTE — PHYSICAL THERAPY INITIAL EVALUATION ADULT - ADDITIONAL COMMENTS
Pt lives alone in a 2 story home on the 2nd level, grandchildren live on the first level. Pt states she was independent with all ADLs and functional mobility without the use of an assistive device. + Pt states she lives alone in a 2 story home on the 2nd level with a chair lift, she states her grandchildren live on the first level. Pt states she was independent with all ADLs and functional mobility without the use of an assistive device. +

## 2023-04-18 LAB
ANION GAP SERPL CALC-SCNC: 10 MMOL/L — SIGNIFICANT CHANGE UP (ref 5–17)
BUN SERPL-MCNC: 7 MG/DL — SIGNIFICANT CHANGE UP (ref 7–23)
CALCIUM SERPL-MCNC: 8.4 MG/DL — SIGNIFICANT CHANGE UP (ref 8.4–10.5)
CHLORIDE SERPL-SCNC: 102 MMOL/L — SIGNIFICANT CHANGE UP (ref 96–108)
CO2 SERPL-SCNC: 25 MMOL/L — SIGNIFICANT CHANGE UP (ref 22–31)
CREAT SERPL-MCNC: 0.68 MG/DL — SIGNIFICANT CHANGE UP (ref 0.5–1.3)
CULTURE RESULTS: SIGNIFICANT CHANGE UP
CULTURE RESULTS: SIGNIFICANT CHANGE UP
EGFR: 89 ML/MIN/1.73M2 — SIGNIFICANT CHANGE UP
FERRITIN SERPL-MCNC: 301 NG/ML — HIGH (ref 15–150)
GLUCOSE BLDC GLUCOMTR-MCNC: 118 MG/DL — HIGH (ref 70–99)
GLUCOSE SERPL-MCNC: 104 MG/DL — HIGH (ref 70–99)
HCT VFR BLD CALC: 23.8 % — LOW (ref 34.5–45)
HGB BLD-MCNC: 7.6 G/DL — LOW (ref 11.5–15.5)
IRON SATN MFR SERPL: 25 % — SIGNIFICANT CHANGE UP (ref 14–50)
IRON SATN MFR SERPL: 47 UG/DL — SIGNIFICANT CHANGE UP (ref 30–160)
MCHC RBC-ENTMCNC: 28.3 PG — SIGNIFICANT CHANGE UP (ref 27–34)
MCHC RBC-ENTMCNC: 31.9 GM/DL — LOW (ref 32–36)
MCV RBC AUTO: 88.5 FL — SIGNIFICANT CHANGE UP (ref 80–100)
NRBC # BLD: 0 /100 WBCS — SIGNIFICANT CHANGE UP (ref 0–0)
PLATELET # BLD AUTO: 368 K/UL — SIGNIFICANT CHANGE UP (ref 150–400)
POTASSIUM SERPL-MCNC: 3.6 MMOL/L — SIGNIFICANT CHANGE UP (ref 3.5–5.3)
POTASSIUM SERPL-SCNC: 3.6 MMOL/L — SIGNIFICANT CHANGE UP (ref 3.5–5.3)
RBC # BLD: 2.69 M/UL — LOW (ref 3.8–5.2)
RBC # FLD: 14.2 % — SIGNIFICANT CHANGE UP (ref 10.3–14.5)
SODIUM SERPL-SCNC: 137 MMOL/L — SIGNIFICANT CHANGE UP (ref 135–145)
SPECIMEN SOURCE: SIGNIFICANT CHANGE UP
SPECIMEN SOURCE: SIGNIFICANT CHANGE UP
TIBC SERPL-MCNC: 188 UG/DL — LOW (ref 220–430)
UIBC SERPL-MCNC: 142 UG/DL — SIGNIFICANT CHANGE UP (ref 110–370)
WBC # BLD: 4.33 K/UL — SIGNIFICANT CHANGE UP (ref 3.8–10.5)
WBC # FLD AUTO: 4.33 K/UL — SIGNIFICANT CHANGE UP (ref 3.8–10.5)

## 2023-04-18 PROCEDURE — 99239 HOSP IP/OBS DSCHRG MGMT >30: CPT

## 2023-04-18 PROCEDURE — 99232 SBSQ HOSP IP/OBS MODERATE 35: CPT

## 2023-04-18 RX ORDER — LANOLIN ALCOHOL/MO/W.PET/CERES
5 CREAM (GRAM) TOPICAL ONCE
Refills: 0 | Status: COMPLETED | OUTPATIENT
Start: 2023-04-18 | End: 2023-04-18

## 2023-04-18 RX ORDER — METOCLOPRAMIDE HCL 10 MG
10 TABLET ORAL
Refills: 0 | Status: DISCONTINUED | OUTPATIENT
Start: 2023-04-18 | End: 2023-04-18

## 2023-04-18 RX ORDER — LANOLIN ALCOHOL/MO/W.PET/CERES
3 CREAM (GRAM) TOPICAL ONCE
Refills: 0 | Status: COMPLETED | OUTPATIENT
Start: 2023-04-18 | End: 2023-04-18

## 2023-04-18 RX ORDER — PANTOPRAZOLE SODIUM 20 MG/1
1 TABLET, DELAYED RELEASE ORAL
Refills: 0 | DISCHARGE

## 2023-04-18 RX ORDER — SIMETHICONE 80 MG/1
80 TABLET, CHEWABLE ORAL ONCE
Refills: 0 | Status: COMPLETED | OUTPATIENT
Start: 2023-04-18 | End: 2023-04-18

## 2023-04-18 RX ORDER — METOCLOPRAMIDE HCL 10 MG
10 TABLET ORAL
Refills: 0 | Status: DISCONTINUED | OUTPATIENT
Start: 2023-04-18 | End: 2023-04-19

## 2023-04-18 RX ORDER — SIMETHICONE 80 MG/1
1 TABLET, CHEWABLE ORAL
Qty: 0 | Refills: 0 | DISCHARGE
Start: 2023-04-18

## 2023-04-18 RX ORDER — POLYETHYLENE GLYCOL 3350 17 G/17G
17 POWDER, FOR SOLUTION ORAL DAILY
Refills: 0 | Status: DISCONTINUED | OUTPATIENT
Start: 2023-04-18 | End: 2023-04-19

## 2023-04-18 RX ADMIN — Medication 5 MILLIGRAM(S): at 23:23

## 2023-04-18 RX ADMIN — Medication 1000 UNIT(S): at 12:34

## 2023-04-18 RX ADMIN — SIMETHICONE 80 MILLIGRAM(S): 80 TABLET, CHEWABLE ORAL at 13:36

## 2023-04-18 RX ADMIN — Medication 10 MILLIGRAM(S): at 23:23

## 2023-04-18 RX ADMIN — SIMETHICONE 80 MILLIGRAM(S): 80 TABLET, CHEWABLE ORAL at 22:13

## 2023-04-18 RX ADMIN — ENOXAPARIN SODIUM 40 MILLIGRAM(S): 100 INJECTION SUBCUTANEOUS at 16:26

## 2023-04-18 RX ADMIN — Medication 10 MILLIGRAM(S): at 16:26

## 2023-04-18 RX ADMIN — POLYETHYLENE GLYCOL 3350 17 GRAM(S): 17 POWDER, FOR SOLUTION ORAL at 13:36

## 2023-04-18 RX ADMIN — Medication 3 MILLIGRAM(S): at 02:00

## 2023-04-18 RX ADMIN — SIMVASTATIN 10 MILLIGRAM(S): 20 TABLET, FILM COATED ORAL at 22:10

## 2023-04-18 NOTE — CHART NOTE - NSCHARTNOTEFT_GEN_A_CORE
Notified by RN regarding pt feeling lightheaded. Pt was seen and examined at bedside, where she was resting comfortably in NAD. Pt was reportedly sitting up in the chair for about an hour when she suddenly felt lightheaded. Pt was moved to the bed, where she says "I feel much better now." Pt endorses she has had poor sleep and appetite today. Denies chest pain, SOB, palpitations, nausea, vomiting. No other complaints otherwise. VSS    Vital Signs Last 24 Hrs  T(C): 36.4 (18 Apr 2023 17:02), Max: 37 (18 Apr 2023 05:04)  T(F): 97.5 (18 Apr 2023 17:02), Max: 98.6 (18 Apr 2023 05:04)  HR: 85 (18 Apr 2023 17:02) (85 - 89)  BP: 119/68 (18 Apr 2023 17:02) (109/65 - 122/77)  BP(mean): --  RR: 18 (18 Apr 2023 17:02) (18 - 18)  SpO2: 93% (18 Apr 2023 17:02) (92% - 95%)    Parameters below as of 18 Apr 2023 17:02  Patient On (Oxygen Delivery Method): room air    A: 77 YO F with hx of bladder surgery (age 15), intestinal dysmotility, SBO s/p exlap with intestinal pexy? 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     Plan:  - Obtain orthostatics  - Will hold DC and continue to monitor overnight, re-assess in the AM   - Will endorse to night team    Event discussed with attending Dr. Bowen, who agrees to plan    Arleen David PA-C  Dept. of Medicine
Second touch    H/P written overnight    Briefly, patient is a 78F PMH SBO, multiple abdominal surgery, with recent admission for sepsis requiring cefepime, presents to the hospital following diarrhea and found to meet sepsis criteria. Now started again on broad spectrum antibiotics and with high concern for C. Diff infection.    #Sepsis  - Continue with cefepime as ordered  - GI PCR, O/P and C. Diff PCR ordered, will send after next episode of diarrhea  - Given recurrence of sepsis following discontinuation of cefepime, will consult ID today and follow up recommendations     Remainder as per H/P

## 2023-04-18 NOTE — PROVIDER CONTACT NOTE (OTHER) - ASSESSMENT
Pt AOx4, VSS, blood glucose stable. Pt was in chair, stood up to go to bathroom and began to feel lightheaded. Had small loose BM and returned to bed.

## 2023-04-18 NOTE — PROVIDER CONTACT NOTE (OTHER) - ACTION/TREATMENT ORDERED:
ACP Arleen David notified and came to assess pt at the bedside. Pt was given time to rest, upon reassessment an hour later, pt stated "I feel much better, I slept a little, I haven't slept in days"

## 2023-04-18 NOTE — PROVIDER CONTACT NOTE (OTHER) - BACKGROUND
Pt admitted for diarrhea and electrolyte imbalance. Recent admission 4 days prior for Ecoli, given miralax. PMHx pseudointestinal obstruction, Breast ca, osteoporosis, GERD.

## 2023-04-19 ENCOUNTER — TRANSCRIPTION ENCOUNTER (OUTPATIENT)
Age: 78
End: 2023-04-19

## 2023-04-19 ENCOUNTER — NON-APPOINTMENT (OUTPATIENT)
Age: 78
End: 2023-04-19

## 2023-04-19 VITALS
DIASTOLIC BLOOD PRESSURE: 66 MMHG | OXYGEN SATURATION: 93 % | SYSTOLIC BLOOD PRESSURE: 116 MMHG | HEART RATE: 105 BPM | TEMPERATURE: 98 F | RESPIRATION RATE: 18 BRPM

## 2023-04-19 PROCEDURE — 87046 STOOL CULTR AEROBIC BACT EA: CPT

## 2023-04-19 PROCEDURE — 87177 OVA AND PARASITES SMEARS: CPT

## 2023-04-19 PROCEDURE — 80053 COMPREHEN METABOLIC PANEL: CPT

## 2023-04-19 PROCEDURE — 36415 COLL VENOUS BLD VENIPUNCTURE: CPT

## 2023-04-19 PROCEDURE — 87635 SARS-COV-2 COVID-19 AMP PRB: CPT

## 2023-04-19 PROCEDURE — 99232 SBSQ HOSP IP/OBS MODERATE 35: CPT

## 2023-04-19 PROCEDURE — 83690 ASSAY OF LIPASE: CPT

## 2023-04-19 PROCEDURE — 82728 ASSAY OF FERRITIN: CPT

## 2023-04-19 PROCEDURE — 85018 HEMOGLOBIN: CPT

## 2023-04-19 PROCEDURE — 83735 ASSAY OF MAGNESIUM: CPT

## 2023-04-19 PROCEDURE — 82962 GLUCOSE BLOOD TEST: CPT

## 2023-04-19 PROCEDURE — 84132 ASSAY OF SERUM POTASSIUM: CPT

## 2023-04-19 PROCEDURE — 85014 HEMATOCRIT: CPT

## 2023-04-19 PROCEDURE — 96365 THER/PROPH/DIAG IV INF INIT: CPT

## 2023-04-19 PROCEDURE — 96375 TX/PRO/DX INJ NEW DRUG ADDON: CPT

## 2023-04-19 PROCEDURE — 85025 COMPLETE CBC W/AUTO DIFF WBC: CPT

## 2023-04-19 PROCEDURE — 87449 NOS EACH ORGANISM AG IA: CPT

## 2023-04-19 PROCEDURE — 87045 FECES CULTURE AEROBIC BACT: CPT

## 2023-04-19 PROCEDURE — 85027 COMPLETE CBC AUTOMATED: CPT

## 2023-04-19 PROCEDURE — 83540 ASSAY OF IRON: CPT

## 2023-04-19 PROCEDURE — 87086 URINE CULTURE/COLONY COUNT: CPT

## 2023-04-19 PROCEDURE — 84100 ASSAY OF PHOSPHORUS: CPT

## 2023-04-19 PROCEDURE — 87507 IADNA-DNA/RNA PROBE TQ 12-25: CPT

## 2023-04-19 PROCEDURE — 82803 BLOOD GASES ANY COMBINATION: CPT

## 2023-04-19 PROCEDURE — 87040 BLOOD CULTURE FOR BACTERIA: CPT

## 2023-04-19 PROCEDURE — 83605 ASSAY OF LACTIC ACID: CPT

## 2023-04-19 PROCEDURE — 83550 IRON BINDING TEST: CPT

## 2023-04-19 PROCEDURE — 82947 ASSAY GLUCOSE BLOOD QUANT: CPT

## 2023-04-19 PROCEDURE — 97161 PT EVAL LOW COMPLEX 20 MIN: CPT

## 2023-04-19 PROCEDURE — 82330 ASSAY OF CALCIUM: CPT

## 2023-04-19 PROCEDURE — 87324 CLOSTRIDIUM AG IA: CPT

## 2023-04-19 PROCEDURE — 87077 CULTURE AEROBIC IDENTIFY: CPT

## 2023-04-19 PROCEDURE — 71275 CT ANGIOGRAPHY CHEST: CPT | Mod: MA

## 2023-04-19 PROCEDURE — 84295 ASSAY OF SERUM SODIUM: CPT

## 2023-04-19 PROCEDURE — 74177 CT ABD & PELVIS W/CONTRAST: CPT | Mod: MA

## 2023-04-19 PROCEDURE — 82435 ASSAY OF BLOOD CHLORIDE: CPT

## 2023-04-19 PROCEDURE — 99285 EMERGENCY DEPT VISIT HI MDM: CPT

## 2023-04-19 PROCEDURE — 81003 URINALYSIS AUTO W/O SCOPE: CPT

## 2023-04-19 PROCEDURE — 80048 BASIC METABOLIC PNL TOTAL CA: CPT

## 2023-04-19 RX ADMIN — Medication 1000 UNIT(S): at 12:23

## 2023-04-19 RX ADMIN — Medication 10 MILLIGRAM(S): at 05:25

## 2023-04-19 RX ADMIN — Medication 10 MILLIGRAM(S): at 12:23

## 2023-04-19 NOTE — PROGRESS NOTE ADULT - ASSESSMENT
77 YO F with hx of bladder surgery (age 15), intestinal dysmotility, SBO s/p exlap with intestinal pexy? 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 
78 year old female with history of bladder surgery, intestinal dysmotility, SBO s/p exlap with intestinal pexy? (1984, no bowel resection), hysterectomy (2005), presenting with fatigue in setting of watery diarrhea.    1. Diarrhea, improving. Focal left sided colitis on CT.   - C-diff/GI PCR neg   - cw IVF and electrolyte repletion  - cw home dose reglan upon discharge  - Miralax x1 dose ordered. Does not have to continue Miralax on dc.  - agree with empiric abx   - outpatient followup with her GI Dr. Gordon     2. recent hx of E. Coli bacteremia. Completed 7 day course of IV cefazolin.  - BC x 2 NGTD  - per ID    3. Chronic intestinal dysmotility   - follows with Dr. Gordon     4. Elevated lipase. No abdominal pain and CT neg for pancreatitis.   - no need to trend         Attending supervision statement: I have personally seen and examined the patient. I fully participated in the care of this patient. I have made amendments to the documentation where necessary, and agree with the history, physical exam, and plan as outlined by the ACP.    [facility]  Gastroenterology and Hepatology  [address] [place] [id number]  Office: [phone]  Cell: [phone]
78 year old female with history of bladder surgery, intestinal dysmotility, SBO s/p exlap with intestinal pexy? (1984, no bowel resection), hysterectomy (2005), presenting with fatigue in setting of watery diarrhea.    1. Diarrhea, improving. Focal left sided colitis on CT.   - C-diff/GI PCR neg   - cw IVF and electrolyte repletion  - dc on home reglan dose and Miralax 17g daily   - outpatient followup with her GI Dr. Gordon     2. recent hx of E. Coli bacteremia. Completed 7 day course of IV cefazolin.  - BC x 2 NGTD  - per ID    3. Chronic intestinal dysmotility   - follows with Dr. Gordon     4. Elevated lipase. No abdominal pain and CT neg for pancreatitis.   - no need to trend     5. Anemia  - iron studies wnl  - I saw stool in toilet and it is light brown  - outpatient followup to have CBC checked in 1 week         Attending supervision statement: I have personally seen and examined the patient. I fully participated in the care of this patient. I have made amendments to the documentation where necessary, and agree with the history, physical exam, and plan as outlined by the ACP.    [facility]  Gastroenterology and Hepatology  [address] [place] [id number]  Office: [phone]  Cell: [phone]
78F with multiple prior abdominal surgeries, chronic intestinal pseudoobstruction, and recent admission for E.coli bacteremia (s/p 7d course of IV abx with Cefepime, Ceftriaxone, Cefazolin 4/3 --> 4/10/23, source suspected GI in setting of pseudoobstruction, discharged home 4/10), readmitted 4/13/23 with generalized weakness and diarrhea.   Patient notes her diarrhea initially began during last admission, test C. Diff negative (4/7) during that admission. In ED, found to have fever 103.5. CT showing L colon fat stranding and wall thickening concerning for focal left sided colitis.     Impression:  #Diarrhea, Colitis - improving, no BMs thus far today. Patient did take Miralax at home, last dose 4/11. Overall low suspicion for C. Diff given only focal colitis on CT.  #Fever, Sepsis - suspect GI source in setting of colitis, there is no recurrent bacteremia from gut translocation    4/13 GI PCR  NEG  4/12 blood cultures x2 NGTD  4/14 Cdiff negative    Antibiotics  cefepime 2g IV q12H 4/12 -->  4/14   metronidazole to 500mg q12H 4/13--> 4/14  pt objected to Metronidazole due to bad taste  Zosyn 4/14--> 4/18    Suggest  STOP  Zosyn 4/14 -->  tolerated   competed 4/18 am    anemia work up in progress  - no iron deficiency  -- previous H/H reviewed  with grand daughter  [phone]      
78 year old female with history of bladder surgery, intestinal dysmotility, SBO s/p exlap with intestinal pexy? (1984, no bowel resection), hysterectomy (2005), presenting with fatigue in setting of watery diarrhea.    1. Diarrhea, improving. Focal left sided colitis on CT.   - C-diff/GI PCR neg   - cw IVF and electrolyte repletion  - home dose reglan on hold   - agree with empiric abx     2. recent hx of E. Coli bacteremia. Completed 7 day course of IV cefazolin.  - BC x 2 NGTD  - per ID    3. Chronic intestinal dysmotility   - follows with Dr. Gordon     4. Elevated lipase. No abdominal pain and CT neg for pancreatitis.   - no need to trend         [facility]  Gastroenterology and Hepatology  [address] [place] [address]  Office: [phone]  Cell: [phone]
78F with multiple prior abdominal surgeries, chronic intestinal pseudoobstruction, and recent admission for E.coli bacteremia (s/p 7d course of IV abx with Cefepime, Ceftriaxone, Cefazolin 4/3 --> 4/10/23, source suspected GI in setting of pseudoobstruction, discharged home 4/10), readmitted 4/13/23 with generalized weakness and diarrhea.   Patient notes her diarrhea initially began during last admission, test C. Diff negative (4/7) during that admission. In ED, found to have fever 103.5. CT showing L colon fat stranding and wall thickening concerning for focal left sided colitis.     Impression:  #Diarrhea, Colitis - improving, no BMs thus far today. Patient did take Miralax at home, last dose 4/11. Overall low suspicion for C. Diff given only focal colitis on CT.  #Fever, Sepsis - suspect GI source in setting of colitis, there is no recurrent bacteremia from gut translocation    4/13 GI PCR  NEG  4/12 blood cultures x2 NGTD  4/14 Cdiff negative    Antibiotics  cefepime 2g IV q12H 4/12 -->  4/14   metronidazole to 500mg q12H 4/13--> 4/14  pt objected to Metronidazole due to bad taste  Zosyn 4/14-->    Suggest  STOP  Zosyn 4/14 -->  tolerated   continued through 4/17   No ID objection to discharge  
78F with multiple prior abdominal surgeries, chronic intestinal pseudoobstruction, and recent admission for E.coli bacteremia (s/p 7d course of IV abx with Cefepime, Ceftriaxone, Cefazolin 4/3 --> 4/10/23, source suspected GI in setting of pseudoobstruction, discharged home 4/10), readmitted 4/13/23 with generalized weakness and diarrhea.   Patient notes her diarrhea initially began during last admission, test C. Diff negative (4/7) during that admission. In ED, found to have fever 103.5. CT showing L colon fat stranding and wall thickening concerning for focal left sided colitis.     Impression:  #Diarrhea, Colitis - improving, no BMs thus far today. Patient did take Miralax at home, last dose 4/11. Overall low suspicion for C. Diff given only focal colitis on CT.  #Fever, Sepsis - suspect GI source in setting of colitis, will need to r/o recurrent bacteremia from gut translocation    4/13 GI PCR  NEG  4/12 blood cultures x2 NGTD    Antibiotics  cefepime 2g IV q12H 4/12 -->  4/14   metronidazole to 500mg q12H 4/13--> 4/14  pt objected to Metronidazole due to bad taste  Zosyn 4/14-->    Suggest  Continue Zosyn 4/14 -->  tolerated   continue through 4/17 unless new findings  discontinue PCN allergy  ambulation encouraged          
78 year old female with history of bladder surgery, intestinal dysmotility, SBO s/p exlap with intestinal pexy? (1984, no bowel resection), hysterectomy (2005), presenting with fatigue in setting of watery diarrhea.    1. Diarrhea. Focal left sided colitis on CT.   - C-diff/GI PCR neg   - cw IVF and electrolyte repletion (needs Mg and phos repletion)  - home dose reglan on hold   - agree with empiric abx     2. recent hx of E. Coli bacteremia. Completed 7 day course of IV cefazolin.  - BC x 2 NGTD  - per ID    3. Chronic intestinal dysmotility   - follows with Dr. oGrdon     4. Elevated lipase. No abdominal pain and CT neg for pancreatitis.   - no need to trend         [facility]  Gastroenterology and Hepatology  [address] [place] [address]  Office: [phone]  Cell: [phone]
77 YO F with hx of bladder surgery (age 15), intestinal dysmotility, SBO s/p exlap with intestinal pexy? (1984, no bowel resection), hysterectomy (2005) admitted with sepsis 2/2 diarrhea i/s/o recent antibiotic use 
79 YO F with hx of bladder surgery (age 15), intestinal dysmotility, SBO s/p exlap with intestinal pexy? (1984, no bowel resection), hysterectomy (2005) admitted with sepsis 2/2 diarrhea i/s/o recent antibiotic use 
77 YO F with hx of bladder surgery (age 15), intestinal dysmotility, SBO s/p exlap with intestinal pexy? 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 
77 YO F with hx of bladder surgery (age 15), intestinal dysmotility, SBO s/p exlap with intestinal pexy? (1984, no bowel resection), hysterectomy (2005) admitted with sepsis 2/2 diarrhea i/s/o recent antibiotic use 
79 YO F with hx of bladder surgery (age 15), intestinal dysmotility, SBO s/p exlap with intestinal pexy? 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

## 2023-04-19 NOTE — DISCHARGE NOTE NURSING/CASE MANAGEMENT/SOCIAL WORK - FLU SEASON?
Authorization completed over the phone with Ruchi    
Could one of the nurses please, Prior Authorize patient's medication Atorvastatin?  [employer] @ [phone]  
No

## 2023-04-19 NOTE — PROGRESS NOTE ADULT - PROBLEM SELECTOR PLAN 5
Reported dyspnea at home   was on 2LNC; now on room air   likely related to deconditioning   CT Chest Angio: neg for PE. Trace bilateral effusions and bibasilar atelectasis.    - PT eval- independent  - incentive spirometer  - monitor O2 sats
Reported dyspnea at home   currently on 2LNC  likely related to deconditioning   CT Chest Angio: neg for PE. Trace bilateral effusions and bibasilar atelectasis.    - PT c/s  - wean O2 as tolerated  - incentive spirometer
Reported dyspnea at home   was on 2LNC; now on room air   likely related to deconditioning   CT Chest Angio: neg for PE. Trace bilateral effusions and bibasilar atelectasis.    - PT eval  - incentive spirometer  - monitor O2 sats
Reported dyspnea at home   was on 2LNC; now on room air   likely related to deconditioning   CT Chest Angio: neg for PE. Trace bilateral effusions and bibasilar atelectasis.    - PT eval  - incentive spirometer  - monitor O2 sats
Reported dyspnea at home   was on 2LNC; now on room air   likely related to deconditioning   CT Chest Angio: neg for PE. Trace bilateral effusions and bibasilar atelectasis.    - PT eval- independent  - incentive spirometer  - monitor O2 sats
Reported dyspnea at home   currently on 2LNC  likely related to deconditioning   CT Chest Angio: neg for PE. Trace bilateral effusions and bibasilar atelectasis.    - PT c/s  - wean O2 as tolerated  - incentive spirometer

## 2023-04-19 NOTE — PROGRESS NOTE ADULT - PROBLEM SELECTOR PLAN 7
on pepcid at home    - hold pepcid given diarrhea

## 2023-04-19 NOTE — DISCHARGE NOTE NURSING/CASE MANAGEMENT/SOCIAL WORK - NSDCPEFALRISK_GEN_ALL_CORE
For information on Fall & Injury Prevention, visit: [email or web address] OR  [email or web address] OR  https://www.cdc.gov/steadi/patient.html

## 2023-04-19 NOTE — PROGRESS NOTE ADULT - REASON FOR ADMISSION
diarrhea, fatigue

## 2023-04-19 NOTE — PROGRESS NOTE ADULT - PROVIDER SPECIALTY LIST ADULT
Gastroenterology
Hospitalist
Infectious Disease
Hospitalist
Hospitalist
Gastroenterology
Infectious Disease
Infectious Disease
Hospitalist

## 2023-04-19 NOTE — PROGRESS NOTE ADULT - PROBLEM SELECTOR PROBLEM 4
Electrolyte abnormality

## 2023-04-19 NOTE — PROGRESS NOTE ADULT - PROBLEM SELECTOR PLAN 8
resume reglan/miralax
resume reglan/miralax
hold reglan in setting of watery diarrhea
c/w reglan

## 2023-04-19 NOTE — PROGRESS NOTE ADULT - PROBLEM SELECTOR PLAN 4
Hypokalemia with hypomagnesemia i/s/o diarrhea s/p repletion in the ED    - Monitor electrolytes and replete PRN
Hypokalemia with hypomagnesemia i/s/o diarrhea s/p repletion in the ED    -resolved   - Monitor electrolytes and replete PRN
Hypokalemia with hypomagnesemia i/s/o diarrhea s/p repletion in the ED    - Monitor electrolytes and replete PRN

## 2023-04-19 NOTE — PROGRESS NOTE ADULT - PROBLEM SELECTOR PROBLEM 8
Colonic dysmotility

## 2023-04-19 NOTE — PROGRESS NOTE ADULT - PROBLEM SELECTOR PLAN 6
Noted to have elevated lipase with no abd pain   low suspicion for pancreatitis     - monitor abd exam
Noted to have elevated lipase with no abd pain   low suspicion for pancreatitis     - monitor abd exam
Noted to have lipase with no abd pain   low suspicion for pancreatitis     - monitor abd exam
Noted to have lipase with no abd pain   low suspicion for pancreatitis     - monitor abd exam
Noted to have elevated lipase with no abd pain   low suspicion for pancreatitis     - monitor abd exam
Noted to have elevated lipase with no abd pain   low suspicion for pancreatitis     - monitor abd exam

## 2023-04-19 NOTE — PROGRESS NOTE ADULT - SUBJECTIVE AND OBJECTIVE BOX
Follow Up:  diarrhea    Interval History/ROS:  looks and feels well, passing stools and gas, taking po    Allergies  penicillins (Unknown)  sulfa drugs (Hives)    ANTIMICROBIALS:  piperacillin/tazobactam IVPB.- 3.375 once  piperacillin/tazobactam IVPB.. 3.375 every 8 hours      OTHER MEDS:  MEDICATIONS  (STANDING):  aluminum hydroxide/magnesium hydroxide/simethicone Suspension 30 every 4 hours PRN  enoxaparin Injectable 40 every 24 hours  simethicone 80 three times a day PRN  simvastatin 10 at bedtime      Vital Signs Last 24 Hrs  T(C): 37.2 (2023 13:23), Max: 37.5 (2023 05:08)  T(F): 98.9 (2023 13:23), Max: 99.5 (2023 05:08)  HR: 79 (:23) (74 - 99)  BP: 128/- (2023 13:23) (128/- - 134/75)  BP(mean): --  RR: 18 (2023 13:23) (18 - 22)  SpO2: 94% (2023 13:23) (92% - 96%)    Parameters below as of 2023 05:08  Patient On (Oxygen Delivery Method): nasal cannula      PHYSICAL EXAM:  General: WN/WD NAD, Non-toxic  Neurology: A&Ox3, nonfocal  Respiratory: Clear to auscultation bilaterally  CV: RRR, S1S2, no murmurs, rubs or gallops  Abdominal: Soft, Non-tender,distended but not as tightly as on  +tympany, normal bowel sounds  Extremities: No edema,  Line Sites: Clear  Skin: No rash                        8.8    4.80  )-----------( 293      ( 2023 07:01 )             27.2   WBC Count: 4.80 ( @ 07:01)  WBC Count: 4.20 ( @ 05:49)  WBC Count: 4.99 ( @ 22:02)  WBC Count: 5.91 (04-10 @ 07:15)        134<L>  |  101  |  12  ----------------------------<  116<H>  3.3<L>   |  20<L>  |  0.91    Ca    7.7<L>      2023 07:00  Phos  3.8       Mg     1.3         TPro  4.9<L>  /  Alb  2.3<L>  /  TBili  0.6  /  DBili  x   /  AST  13  /  ALT  8<L>  /  AlkPhos  61        Urinalysis Basic - ( 2023 09:13 )    Color: Yellow / Appearance: Clear / S.025 / pH: x  Gluc: x / Ketone: Negative  / Bili: Negative / Urobili: <2 mg/dL   Blood: x / Protein: Trace / Nitrite: Negative   Leuk Esterase: Negative / RBC: x / WBC x   Sq Epi: x / Non Sq Epi: x / Bacteria: x    [facility] PCR Panel Stool (23 @ 14:53)   GI PCR Panel: [facility]: GI Panel PCR evaluates for:   Campylobacter, Plesiomonas shigelloides, Salmonella, Vibrio, Yersinia   enterocolitica, Enteroaggregative Escherichia (EAEC), Enteropathogenic E.   coli (EPEC), Enterotoxigenic E. coli (ETEC), Shiga-like toxin producing   E.coli (STEC), E. coli O157, Shigella/Enteroinvasive E. coli (EIEC),   Adenovirus, Astrovirus, Norovirus, Rotavirus, Sapovirus, Cryptosporidium,   Cyclospora cayetanensis, Entamoeba histolytica, Giardia lamblia.   For culture and susceptibility reports refer to "reflex stool culture".  MICROBIOLOGY:  .Stool Feces  23   No Protozoa seen by trichrome stain  No Helminths or Protozoa seen in formalin concentrate  performed by iodine stain      .Blood Blood-Venous  23   No growth to date.  --  --      .Blood Blood-Venous  23   No growth to date.  --  --      .Blood Blood-Arterial  23   No Growth Final  --  --      Clean Catch Clean Catch (Midstream)  23   <10,000 CFU/mL Normal Urogenital Ling  --  --      .Blood Blood-Venous  23   Growth in aerobic and anaerobic bottles: Escherichia coli        .Blood Blood-Peripheral  23   Growth in aerobic and anaerobic bottles: Escherichia coli      RADIOLOGY:  < from: CT Abdomen and Pelvis w/ IV Cont (23 @ 02:58) >  FINDINGS:  CHEST:  LUNGS AND AIRWAYS: Patent central airways.  Centrilobular emphysema.   Bilateral lower lobe subsegmental atelectasis. Tiny unchanged 2 mm   noncalcified nodule at the close of the right lobe (2, 19). Unchanged   juxtapleural opacities and bronchiectasis in the lingula, likely   secondary to prior radiation treatment.  PLEURA: Trace bilateral pleural effusions.  MEDIASTINUM AND SHAHNAZ: Unchanged 2.6 x 2.1 cm anterior mediastinum lesion   that measures fluid attenuation.  VESSELS: No pulmonary embolus. Atherosclerotic calcifications of the   aorta and coronary arteries.  HEART: Heart size is normal. No pericardial effusion. Aortic   calcifications.  MEDIASTINUM AND SHAHNAZ: No lymphadenopathy.  CHEST WALL AND LOWER NECK: Within normal limits.    ABDOMEN AND PELVIS:  LIVER: Multiple hepatic cysts, measuring up to 5.7 cm.  BILE DUCTS: Normal caliber.  GALLBLADDER: Contracted gallbladder, limiting evaluation.  SPLEEN: Within normal limits.  PANCREAS: Within normal limits.  ADRENALS: Within normal limits.  KIDNEYS/URETERS: Mild bilateral hydroureteronephrosis.    BLADDER: Significantly distended bladder.  REPRODUCTIVE ORGANS: Hysterectomy.    BOWEL: Second and third portion of the duodenum remain markedly dilated   with a transition point at the midline, with unclear etiology. The   ascending and transverse colon are prominent in size. Findings could   suggest chronic intestinal pseudo-obstruction. Interval increase in   inflammatory fat stranding, trace free fluid, and wall thickening of a   short segment of the left colon (3, 39) (3, 46). There is been interval   improvement in small bowel dilatation. Small hiatal hernia.  PERITONEUM: Trace ascites.  VESSELS: Atherosclerotic changes. Tortuous splenorenal shunt.  RETROPERITONEUM/LYMPH NODES: No lymphadenopathy.  ABDOMINAL WALL: Surgical clips overlie the mid abdominal wall and right   lower quadrant  BONES: Degenerative changes.    IMPRESSION:    No pulmonary embolus.    Interval increase in inflammatory fat stranding, trace free fluid, and   wall thickening of short segment of the left colon, which may represent   colitis.    Persistent dilatation of the second and third portion of the duodenum   with the transition point of the midline, of unclear etiology.    Interval improvement in small bowel dilatation.    < end of copied text >      Andres Garnett MD; Division of Infectious Disease; Pager: [phone]; nights and weekends: [phone]
  Chief Complaint:  Patient is a 78y old  Female who presents with a chief complaint of diarrhea, fatigue (15 Apr 2023 12:13)      Date of service 04-15-23 @ 12:56      Interval Events:   liquid BMs but less frequent     Hospital Medications:  cholecalciferol 1000 Unit(s) Oral daily  enoxaparin Injectable 40 milliGRAM(s) SubCutaneous every 24 hours  lactated ringers. 1000 milliLiter(s) IV Continuous <Continuous>  piperacillin/tazobactam IVPB.. 3.375 Gram(s) IV Intermittent every 8 hours  simethicone 80 milliGRAM(s) Chew three times a day PRN  simvastatin 10 milliGRAM(s) Oral at bedtime        Review of Systems:  General:  No wt loss, fevers, chills, night sweats, fatigue,   Eyes:  Good vision, no reported pain  ENT:  No sore throat, pain, runny nose, dysphagia  CV:  No pain, palpitations, hypo/hypertension  Resp:  No dyspnea, cough, tachypnea, wheezing  GI:  See HPI  :  No pain, bleeding, incontinence, nocturia  Muscle:  No pain, weakness  Neuro:  No weakness, tingling, memory problems  Psych:  No fatigue, insomnia, mood problems, depression  Endocrine:  No polyuria, polydipsia, cold/heat intolerance  Heme:  No petechiae, ecchymosis, easy bruisability  Integumentary:  No rash, edema    PHYSICAL EXAM:   Vital Signs:  Vital Signs Last 24 Hrs  T(C): 37.2 (15 Apr 2023 04:44), Max: 37.2 (14 Apr 2023 13:23)  T(F): 99 (15 Apr 2023 04:44), Max: 99 (15 Apr 2023 04:44)  HR: 94 (15 Apr 2023 04:44) (79 - 96)  BP: 120/72 (15 Apr 2023 04:44) (120/72 - 128/-)  BP(mean): --  RR: 18 (15 Apr 2023 04:44) (18 - 18)  SpO2: 92% (15 Apr 2023 04:44) (91% - 94%)      Daily     Daily       PHYSICAL EXAM:     GENERAL:  Appears stated age, well-groomed, well-nourished, no distress  HEENT:  NC/AT,  conjunctivae anicteric, clear and pink,   NECK: supple, trachea midline  CHEST:  Full & symmetric excursion, no increased effort, breath sounds clear  HEART:  Regular rhythm, no JVD  ABDOMEN:  Soft, non-tender, non-distended, normoactive bowel sounds,  no masses , no hepatosplenomegaly  EXTREMITIES:  no cyanosis,clubbing or edema  SKIN:  No rash, erythema, or, ecchymoses, no jaundice  NEURO:  Alert, non-focal, no asterixis  PSYCH: Appropriate affect, oriented to place and time  RECTAL: Deferred      LABS Personally reviewed by me:                        7.9    4.22  )-----------( 275      ( 15 Apr 2023 06:54 )             24.3     Mean Cell Volume: 87.1 fl (04-15-23 @ 06:54)    04-15    133<L>  |  100  |  11  ----------------------------<  117<H>  3.1<L>   |  20<L>  |  0.97    Ca    7.5<L>      15 Apr 2023 06:54  Phos  2.3     04-15  Mg     1.4     04-15                                    7.9    4.22  )-----------( 275      ( 15 Apr 2023 06:54 )             24.3                         8.8    4.80  )-----------( 293      ( 14 Apr 2023 07:01 )             27.2                         8.2    4.20  )-----------( 245      ( 13 Apr 2023 05:49 )             25.8                         9.4    4.99  )-----------( 274      ( 12 Apr 2023 22:02 )             29.1       Imaging personally reviewed by me:          
  Chief Complaint:  Patient is a 78y old  Female who presents with a chief complaint of diarrhea, fatigue (16 Apr 2023 12:31)      Date of service 04-16-23 @ 14:34      Interval Events:   feels improved     Hospital Medications:  acetaminophen     Tablet .. 650 milliGRAM(s) Oral every 6 hours PRN  cholecalciferol 1000 Unit(s) Oral daily  enoxaparin Injectable 40 milliGRAM(s) SubCutaneous every 24 hours  lactated ringers. 1000 milliLiter(s) IV Continuous <Continuous>  piperacillin/tazobactam IVPB.. 3.375 Gram(s) IV Intermittent every 8 hours  simethicone 80 milliGRAM(s) Chew daily  simvastatin 10 milliGRAM(s) Oral at bedtime        Review of Systems:  General:  No wt loss, fevers, chills, night sweats, fatigue,   Eyes:  Good vision, no reported pain  ENT:  No sore throat, pain, runny nose, dysphagia  CV:  No pain, palpitations, hypo/hypertension  Resp:  No dyspnea, cough, tachypnea, wheezing  GI:  See HPI  :  No pain, bleeding, incontinence, nocturia  Muscle:  No pain, weakness  Neuro:  No weakness, tingling, memory problems  Psych:  No fatigue, insomnia, mood problems, depression  Endocrine:  No polyuria, polydipsia, cold/heat intolerance  Heme:  No petechiae, ecchymosis, easy bruisability  Integumentary:  No rash, edema    PHYSICAL EXAM:   Vital Signs:  Vital Signs Last 24 Hrs  T(C): 36.7 (16 Apr 2023 14:27), Max: 37.2 (15 Apr 2023 22:18)  T(F): 98 (16 Apr 2023 14:27), Max: 99 (15 Apr 2023 22:18)  HR: 82 (16 Apr 2023 14:27) (82 - 87)  BP: 118/71 (16 Apr 2023 14:27) (104/63 - 118/71)  BP(mean): --  RR: 18 (16 Apr 2023 14:27) (18 - 18)  SpO2: 95% (16 Apr 2023 14:27) (93% - 95%)    Parameters below as of 16 Apr 2023 04:25  Patient On (Oxygen Delivery Method): room air      Daily     Daily       PHYSICAL EXAM:     GENERAL:  Appears stated age, well-groomed, well-nourished, no distress  HEENT:  NC/AT,  conjunctivae anicteric, clear and pink,   NECK: supple, trachea midline  CHEST:  Full & symmetric excursion, no increased effort, breath sounds clear  HEART:  Regular rhythm, no JVD  ABDOMEN:  Soft, non-tender, non-distended, normoactive bowel sounds,  no masses , no hepatosplenomegaly  EXTREMITIES:  no cyanosis,clubbing or edema  SKIN:  No rash, erythema, or, ecchymoses, no jaundice  NEURO:  Alert, non-focal, no asterixis  PSYCH: Appropriate affect, oriented to place and time  RECTAL: Deferred      LABS Personally reviewed by me:                        7.4    3.21  )-----------( 311      ( 16 Apr 2023 07:04 )             23.0     Mean Cell Volume: 88.5 fl (04-16-23 @ 07:04)    04-16    137  |  106  |  10  ----------------------------<  114<H>  3.9   |  20<L>  |  0.82    Ca    7.7<L>      16 Apr 2023 07:04  Phos  3.5     04-16  Mg     2.0     04-16                                    7.4    3.21  )-----------( 311      ( 16 Apr 2023 07:04 )             23.0                         7.9    4.22  )-----------( 275      ( 15 Apr 2023 06:54 )             24.3                         8.8    4.80  )-----------( 293      ( 14 Apr 2023 07:01 )             27.2       Imaging personally reviewed by me:          
  Chief Complaint:  Patient is a 78y old  Female who presents with a chief complaint of diarrhea, fatigue (16 Apr 2023 14:34)      Date of service 04-17-23 @ 14:38      Interval Events:   Patient seen and examined.   Having BMs with some particles in it. BMs less frequent.   No abdominal pain, nausea or vomiting.    Hospital Medications:  acetaminophen     Tablet .. 650 milliGRAM(s) Oral every 6 hours PRN  cholecalciferol 1000 Unit(s) Oral daily  enoxaparin Injectable 40 milliGRAM(s) SubCutaneous every 24 hours  lactated ringers. 1000 milliLiter(s) IV Continuous <Continuous>  piperacillin/tazobactam IVPB.. 3.375 Gram(s) IV Intermittent every 8 hours  simethicone 80 milliGRAM(s) Chew two times a day  simvastatin 10 milliGRAM(s) Oral at bedtime        Review of Systems:  General:  No wt loss, fevers, chills, night sweats, fatigue,   Eyes:  Good vision, no reported pain  ENT:  No sore throat, pain, runny nose, dysphagia  CV:  No pain, palpitations, hypo/hypertension  Resp:  No dyspnea, cough, tachypnea, wheezing  GI:  See HPI  :  No pain, bleeding, incontinence, nocturia  Muscle:  No pain, weakness  Neuro:  No weakness, tingling, memory problems  Psych:  No fatigue, insomnia, mood problems, depression  Endocrine:  No polyuria, polydipsia, cold/heat intolerance  Heme:  No petechiae, ecchymosis, easy bruisability  Integumentary:  No rash, edema    PHYSICAL EXAM:   Vital Signs:  Vital Signs Last 24 Hrs  T(C): 36.5 (17 Apr 2023 12:13), Max: 36.7 (16 Apr 2023 21:25)  T(F): 97.7 (17 Apr 2023 12:13), Max: 98 (16 Apr 2023 21:25)  HR: 86 (17 Apr 2023 12:52) (78 - 86)  BP: 133/61 (17 Apr 2023 12:52) (104/60 - 133/61)  BP(mean): --  RR: 18 (17 Apr 2023 12:13) (18 - 18)  SpO2: 86% (17 Apr 2023 12:52) (86% - 95%)    Parameters below as of 17 Apr 2023 12:52  Patient On (Oxygen Delivery Method): room air      Daily     Daily       PHYSICAL EXAM:     GENERAL:  Appears stated age, well-groomed, well-nourished, no distress  HEENT:  NC/AT,  conjunctivae anicteric, clear and pink,   NECK: supple, trachea midline  CHEST:  Full & symmetric excursion, no increased effort, breath sounds clear  HEART:  Regular rhythm, no JVD  ABDOMEN:  Soft, non-tender, +distended, normoactive bowel sounds,  no masses , no hepatosplenomegaly  EXTREMITIES:  no cyanosis,clubbing or edema  SKIN:  No rash, erythema, or, ecchymoses, no jaundice  NEURO:  Alert, non-focal, no asterixis  PSYCH: Appropriate affect, oriented to place and time  RECTAL: Deferred      LABS Personally reviewed by me:                        7.5    3.96  )-----------( 361      ( 17 Apr 2023 07:24 )             23.0     Mean Cell Volume: 87.5 fl (04-17-23 @ 07:24)    04-17    134<L>  |  101  |  7   ----------------------------<  112<H>  3.5   |  23  |  0.75    Ca    8.1<L>      17 Apr 2023 07:24  Phos  2.7     04-17  Mg     1.6     04-17                                    7.5    3.96  )-----------( 361      ( 17 Apr 2023 07:24 )             23.0                         7.4    3.21  )-----------( 311      ( 16 Apr 2023 07:04 )             23.0                         7.9    4.22  )-----------( 275      ( 15 Apr 2023 06:54 )             24.3       Imaging personally reviewed by me:          
  Chief Complaint:  Patient is a 78y old  Female who presents with a chief complaint of diarrhea, fatigue (18 Apr 2023 17:38)      Date of service 04-19-23 @ 11:20      Interval Events:   Patient seen and examined.   Had 2 soft, formed, brown BMs this morning.       Hospital Medications:  acetaminophen     Tablet .. 650 milliGRAM(s) Oral every 6 hours PRN  cholecalciferol 1000 Unit(s) Oral daily  enoxaparin Injectable 40 milliGRAM(s) SubCutaneous every 24 hours  lactated ringers. 1000 milliLiter(s) IV Continuous <Continuous>  metoclopramide 10 milliGRAM(s) Oral four times a day  polyethylene glycol 3350 17 Gram(s) Oral daily  simethicone 80 milliGRAM(s) Chew two times a day  simvastatin 10 milliGRAM(s) Oral at bedtime        Review of Systems:  General:  No wt loss, fevers, chills, night sweats, fatigue,   Eyes:  Good vision, no reported pain  ENT:  No sore throat, pain, runny nose, dysphagia  CV:  No pain, palpitations, hypo/hypertension  Resp:  No dyspnea, cough, tachypnea, wheezing  GI:  See HPI  :  No pain, bleeding, incontinence, nocturia  Muscle:  No pain, weakness  Neuro:  No weakness, tingling, memory problems  Psych:  No fatigue, insomnia, mood problems, depression  Endocrine:  No polyuria, polydipsia, cold/heat intolerance  Heme:  No petechiae, ecchymosis, easy bruisability  Integumentary:  No rash, edema    PHYSICAL EXAM:   Vital Signs:  Vital Signs Last 24 Hrs  T(C): 36.5 (19 Apr 2023 04:48), Max: 36.8 (18 Apr 2023 11:44)  T(F): 97.7 (19 Apr 2023 04:48), Max: 98.3 (18 Apr 2023 11:44)  HR: 90 (19 Apr 2023 04:48) (85 - 90)  BP: 113/63 (19 Apr 2023 04:48) (113/63 - 119/68)  BP(mean): --  RR: 18 (19 Apr 2023 04:48) (18 - 18)  SpO2: 93% (19 Apr 2023 04:48) (93% - 94%)    Parameters below as of 19 Apr 2023 04:48  Patient On (Oxygen Delivery Method): room air      Daily     Daily       PHYSICAL EXAM:     GENERAL:  Appears stated age, well-groomed, well-nourished, no distress  HEENT:  NC/AT,  conjunctivae anicteric, clear and pink,   NECK: supple, trachea midline  CHEST:  Full & symmetric excursion, no increased effort, breath sounds clear  HEART:  Regular rhythm, no JVD  ABDOMEN:  Soft, non-tender, non-distended, normoactive bowel sounds,  no masses , no hepatosplenomegaly  EXTREMITIES:  no cyanosis,clubbing or edema  SKIN:  No rash, erythema, or, ecchymoses, no jaundice  NEURO:  Alert, non-focal, no asterixis  PSYCH: Appropriate affect, oriented to place and time  RECTAL: Deferred      LABS Personally reviewed by me:                        7.6    4.33  )-----------( 368      ( 18 Apr 2023 07:17 )             23.8       04-18    137  |  102  |  7   ----------------------------<  104<H>  3.6   |  25  |  0.68    Ca    8.4      18 Apr 2023 07:17                                    7.6    4.33  )-----------( 368      ( 18 Apr 2023 07:17 )             23.8                         7.5    3.96  )-----------( 361      ( 17 Apr 2023 07:24 )             23.0       Imaging personally reviewed by me:          
  Follow Up:  colitis    Interval History/ROS:  relatively constipated,  concerned about anemia    Allergies  penicillins (Unknown)  sulfa drugs (Hives)    ANTIMICROBIALS:      OTHER MEDS:  MEDICATIONS  (STANDING):  acetaminophen     Tablet .. 650 every 6 hours PRN  enoxaparin Injectable 40 every 24 hours  metoclopramide 10 four times a day  polyethylene glycol 3350 17 daily  simethicone 80 two times a day  simvastatin 10 at bedtime      Vital Signs Last 24 Hrs  T(C): 36.4 (18 Apr 2023 17:02), Max: 37 (18 Apr 2023 05:04)  T(F): 97.5 (18 Apr 2023 17:02), Max: 98.6 (18 Apr 2023 05:04)  HR: 85 (18 Apr 2023 17:02) (85 - 89)  BP: 119/68 (18 Apr 2023 17:02) (109/65 - 122/77)  BP(mean): --  RR: 18 (18 Apr 2023 17:02) (18 - 18)  SpO2: 93% (18 Apr 2023 17:02) (92% - 95%)    Parameters below as of 18 Apr 2023 17:02  Patient On (Oxygen Delivery Method): room air      PHYSICAL EXAM:  General: WN/WD NAD, Non-toxic  Neurology: A&Ox3, nonfocal  Respiratory: Clear to auscultation bilaterally  CV: RRR, S1S2, no murmurs, rubs or gallops  Abdominal: Soft, Non-tender, non-distended, normal bowel sounds  Extremities: No edema, + peripheral pulses  Line Sites: Clear  Skin: No rash                          7.6    4.33  )-----------( 368      ( 18 Apr 2023 07:17 )             23.8       04-18    137  |  102  |  7   ----------------------------<  104<H>  3.6   |  25  |  0.68    Ca    8.4      18 Apr 2023 07:17  Phos  2.7     04-17  Mg     1.6     04-17            MICROBIOLOGY:  .Stool Feces  04-13-23   No enteric pathogens isolated.  (Stool culture examined for Salmonella,  Shigella, Campylobacter, Aeromonas, Plesiomonas,  Vibrio, E.coli O157 and Yersinia)  --  --      Clean Catch Clean Catch (Midstream)  04-13-23   No growth  --  --      .Blood Blood-Venous  04-12-23   No Growth Final  --  --      .Blood Blood-Venous  04-12-23   No Growth Final  --  --      .Blood Blood-Arterial  04-06-23   No Growth Final  --  --      Clean Catch Clean Catch (Midstream)  04-03-23   <10,000 CFU/mL Normal Urogenital Ling  --  --      .Blood Blood-Venous  04-03-23   Growth in aerobic and anaerobic bottles: Escherichia coli        .Blood Blood-Peripheral  04-03-23   Growth in aerobic and anaerobic bottles: Escherichia coli  See previous culture [id number]  --    Growth in aerobic bottle: Gram Negative Rods  Growth in anaerobic bottle: Gram Negative Rods      Clostridium difficile GDH Toxins A&amp;B, EIA:   Negative (04-14-23 @ 19:51)  Clostridium difficile GDH Interpretation: Negative for toxigenic C. Difficile.  This specimen is negative for C.  Difficile glutamate dehydrogenase (GDH) antigen and negative for C.  Difficile Toxins A & B, by EIA.  GDH is a highly sensitive screening  marker for C. Difficile that is produced in large amounts by all C.  Difficile strains, both toxigenic and nontoxigenic.  This assay has not  been validated as a test of cure.  Repeat testing during the same episode  of diarrhea is of limited value and is discouraged.  The results of this  assay should always be interpreted in conjunction with pateint's clinical  history. (04-14-23 @ 19:51)      RADIOLOGY:    Andres Garnett MD; Division of Infectious Disease; Pager: [phone]; nights and weekends: [phone]
  Follow Up:  diarrhea    Interval History/ROS:  passing loose stools,  feels better    Allergies  penicillins (Unknown)  sulfa drugs (Hives)    ANTIMICROBIALS:  piperacillin/tazobactam IVPB.. 3.375 every 8 hours      OTHER MEDS:  MEDICATIONS  (STANDING):  acetaminophen     Tablet .. 650 every 6 hours PRN  enoxaparin Injectable 40 every 24 hours  simethicone 80 two times a day  simvastatin 10 at bedtime      Vital Signs Last 24 Hrs  T(C): 36.5 (17 Apr 2023 12:13), Max: 36.7 (16 Apr 2023 21:25)  T(F): 97.7 (17 Apr 2023 12:13), Max: 98 (16 Apr 2023 21:25)  HR: 86 (17 Apr 2023 12:52) (78 - 86)  BP: 133/61 (17 Apr 2023 12:52) (104/60 - 133/61)  BP(mean): --  RR: 18 (17 Apr 2023 12:13) (18 - 18)  SpO2: 96% (17 Apr 2023 12:52) (94% - 96%)    Parameters below as of 17 Apr 2023 12:52  Patient On (Oxygen Delivery Method): room air        PHYSICAL EXAM:  General: WN/WD NAD, Non-toxic  Neurology: A&Ox3, nonfocal  Respiratory: Clear to auscultation bilaterally  CV: RRR, S1S2, no murmurs, rubs or gallops  Abdominal: Soft, Non-tender, non-distended, normal bowel sounds  Extremities: No edema,  Line Sites: Clear  Skin: No rash                          7.5    3.96  )-----------( 361      ( 17 Apr 2023 07:24 )             23.0   WBC Count: 3.96 (04-17 @ 07:24)  WBC Count: 3.21 (04-16 @ 07:04)  WBC Count: 4.22 (04-15 @ 06:54)  WBC Count: 4.80 (04-14 @ 07:01)  WBC Count: 4.20 (04-13 @ 05:49)  WBC Count: 4.99 (04-12 @ 22:02)    04-17    134<L>  |  101  |  7   ----------------------------<  112<H>  3.5   |  23  |  0.75    Ca    8.1<L>      17 Apr 2023 07:24  Phos  2.7     04-17  Mg     1.6     04-17     (04.14.23 @ 19:51)   Clostridium difficile GDH Toxins A&B, EIA:   Negative  Clostridium difficile GDH Interpretation: Negative for toxigenic C. Difficile. This specimen is negative for C.   Difficile glutamate dehydrogenase (GDH) antigen and negative for C.   Difficile Toxins A & B, by EIA.        MICROBIOLOGY:  .Stool Feces  04-13-23   No enteric pathogens isolated.  (Stool culture examined for Salmonella,  Shigella, Campylobacter, Aeromonas, Plesiomonas,  Vibrio, E.coli O157 and Yersinia)  --  --      Clean Catch Clean Catch (Midstream)  04-13-23   No growth  --  --      .Blood Blood-Venous  04-12-23   No growth to date.  --  --      .Blood Blood-Venous  04-12-23   No growth to date.  --  --      .Blood Blood-Arterial  04-06-23   No Growth Final  --  --      Clean Catch Clean Catch (Midstream)  04-03-23   <10,000 CFU/mL Normal Urogenital Ling  --  --      .Blood Blood-Venous  04-03-23   Growth in aerobic and anaerobic bottles: Escherichia coli  ***Blood Panel PCR results on this specimen are available  approximately 3 hours after the Gram stain result.***  Gram stain, PCR, and/or culture results may not always  correspond due to difference in methodologies.  ************************************************************  This PCR assay was performed by multiplex PCR. This  Assay tests for 66 bacterial and resistance gene targets.  Please refer to the [facility] test directory  at [email or web address] for details.  --  Blood Culture PCR  Escherichia coli      .Blood Blood-Peripheral  04-03-23   Growth in aerobic and anaerobic bottles: Escherichia coli  See previous culture [id number]  --    Growth in aerobic bottle: Gram Negative Rods  Growth in anaerobic bottle: Gram Negative Rods        Clostridium difficile GDH Toxins A&amp;B, EIA:   Negative (04-14-23 @ 19:51)  Clostridium difficile GDH Interpretation: Negative for toxigenic C. Difficile.  This specimen is negative for C.  Difficile glutamate dehydrogenase (GDH) antigen and negative for C.  Difficile Toxins A & B, by EIA. (04-14-23 @ 19:51)      RADIOLOGY:  < from: CT Abdomen and Pelvis w/ IV Cont (04.13.23 @ 02:58) >  IMPRESSION:    No pulmonary embolus.    Interval increase in inflammatory fat stranding, trace free fluid, and   wall thickening of short segment of the left colon, which may represent   colitis.    Persistent dilatation of the second and third portion of the duodenum   with the transition point of the midline, of unclear etiology.    Interval improvement in small bowel dilatation.    < end of copied text >      Andres Garnett MD; Division of Infectious Disease; Pager: [phone]; nights and weekends: [phone]
[facility] Division of Hospital Medicine  Kim Fletcher MD  Available via MS Teams    SUBJECTIVE / OVERNIGHT EVENTS: Patient feeling bloated this morning. Denies any chest pain or SOB. Diarrhea. No other complaints or concerns at this time.     Review of Systems:   CONSTITUTIONAL: No fever   EYES: No eye pain, visual disturbances, or discharge  ENMT: No difficulty hearing   RESPIRATORY: No SOB. No cough   CARDIOVASCULAR: No chest pain   GASTROINTESTINAL: abdominal bloating. No nausea, vomiting, or hematemesis; No constipation   GENITOURINARY: No dysuria   NEUROLOGICAL: No headaches   SKIN: No itching    MUSCULOSKELETAL: No joint pain or swelling; No muscle or back pain  PSYCHIATRIC: No depression or anxiety   HEME/LYMPH: No easy bruising or bleeding gums      MEDICATIONS  (STANDING):  cholecalciferol 1000 Unit(s) Oral daily  enoxaparin Injectable 40 milliGRAM(s) SubCutaneous every 24 hours  lactated ringers. 1000 milliLiter(s) (100 mL/Hr) IV Continuous <Continuous>  metoclopramide 10 milliGRAM(s) Oral four times a day  piperacillin/tazobactam IVPB.- 3.375 Gram(s) IV Intermittent once  piperacillin/tazobactam IVPB.. 3.375 Gram(s) IV Intermittent every 8 hours  simvastatin 10 milliGRAM(s) Oral at bedtime    MEDICATIONS  (PRN):  aluminum hydroxide/magnesium hydroxide/simethicone Suspension 30 milliLiter(s) Oral every 4 hours PRN Dyspepsia  simethicone 80 milliGRAM(s) Chew three times a day PRN Gas      PHYSICAL EXAM:  Vital Signs Last 24 Hrs  T(C): 37.2 (2023 13:23), Max: 37.5 (2023 05:08)  T(F): 98.9 (2023 13:23), Max: 99.5 (2023 05:08)  HR: 79 (2023 13:23) (74 - 99)  BP: 128/- (2023 13:23) (128/- - 134/75)  RR: 18 (2023 13:23) (18 - 22)  SpO2: 94% (2023 13:23) (92% - 96%)    Parameters below as of 2023 05:08  Patient On (Oxygen Delivery Method): nasal cannula      CONSTITUTIONAL: mild distress due to abdominal bloating, well-developed   EYES: PERRLA; conjunctiva and sclera clear  ENMT: Moist oral mucosa, no pharyngeal injection or exudates   NECK: Supple   RESPIRATORY: Normal respiratory effort; lungs are clear to auscultation bilaterally  CARDIOVASCULAR: Regular rate and rhythm, normal S1 and S2, no murmur   ABDOMEN: abdominal distention; nontender to palpation, normoactive bowel sounds   MUSCULOSKELETAL: no clubbing or cyanosis of digits; no joint swelling or tenderness to palpation  PSYCH: A+O to person, place, and time; affect appropriate  NEUROLOGY: no gross sensory deficits   SKIN: No rashes     LABS:                        8.8    4.80  )-----------( 293      ( 2023 07:01 )             27.2     04-14    134<L>  |  101  |  12  ----------------------------<  116<H>  3.3<L>   |  20<L>  |  0.91    Ca    7.7<L>      2023 07:00  Phos  3.8     04-13  Mg     1.3     04-13    TPro  4.9<L>  /  Alb  2.3<L>  /  TBili  0.6  /  DBili  x   /  AST  13  /  ALT  8<L>  /  AlkPhos  61  04-13      Urinalysis Basic - ( 2023 09:13 )    Color: Yellow / Appearance: Clear / S.025 / pH: x  Gluc: x / Ketone: Negative  / Bili: Negative / Urobili: <2 mg/dL   Blood: x / Protein: Trace / Nitrite: Negative   Leuk Esterase: Negative / RBC: x / WBC x   Sq Epi: x / Non Sq Epi: x / Bacteria: x      Culture - Blood (collected 2023 22:15)  Source: .Blood Blood-Venous  Preliminary Report (2023 03:03):    No growth to date.    Culture - Blood (collected 2023 22:00)  Source: .Blood Blood-Venous  Preliminary Report (2023 03:03):    No growth to date.      COVID-19 PCR: NotDetec (2023 00:13)      RADIOLOGY & ADDITIONAL TESTS:  New Imaging Personally Reviewed Today:  New Electrocardiogram Personally Reviewed Today:  Other Results Reviewed Today:   Prior or Outpatient Records Reviewed Today with Summary:    COORDINATION OF CARE:  Consultant Communication and Details of Discussion (where applicable):    
Andrey Bowen MD  Division of Hospital Medicine  Available on MS teams until 7pm  If no response or off-hours, page [phone]  -------------------------------------    Patient is a 78y old  Female who presents with a chief complaint of diarrhea, fatigue (17 Apr 2023 14:37)      SUBJECTIVE / OVERNIGHT EVENTS: none acute  ADDITIONAL REVIEW OF SYSTEMS: pt reports feeling better- no more diarrhea, bloating improved with gas-x, no fevers/chills. Eager to resume miralax given her dysmotility issues.     MEDICATIONS  (STANDING):  cholecalciferol 1000 Unit(s) Oral daily  enoxaparin Injectable 40 milliGRAM(s) SubCutaneous every 24 hours  lactated ringers. 1000 milliLiter(s) (100 mL/Hr) IV Continuous <Continuous>  piperacillin/tazobactam IVPB.. 3.375 Gram(s) IV Intermittent every 8 hours  simethicone 80 milliGRAM(s) Chew two times a day  simvastatin 10 milliGRAM(s) Oral at bedtime    MEDICATIONS  (PRN):  acetaminophen     Tablet .. 650 milliGRAM(s) Oral every 6 hours PRN Temp greater or equal to 38C (100.4F), Mild Pain (1 - 3), Moderate Pain (4 - 6), Severe Pain (7 - 10)      CAPILLARY BLOOD GLUCOSE        I&O's Summary      PHYSICAL EXAM:  Vital Signs Last 24 Hrs  T(C): 36.5 (17 Apr 2023 12:13), Max: 36.7 (16 Apr 2023 21:25)  T(F): 97.7 (17 Apr 2023 12:13), Max: 98 (16 Apr 2023 21:25)  HR: 86 (17 Apr 2023 12:52) (78 - 86)  BP: 133/61 (17 Apr 2023 12:52) (104/60 - 133/61)  BP(mean): --  RR: 18 (17 Apr 2023 12:13) (18 - 18)  SpO2: 96% (17 Apr 2023 12:52) (94% - 96%)    Parameters below as of 17 Apr 2023 12:52  Patient On (Oxygen Delivery Method): room air      CONSTITUTIONAL: NAD  EYES: PERRLA; conjunctiva and sclera clear  ENMT: MMM  NECK: Supple  RESPIRATORY: Normal respiratory effort; CTAB  CARDIOVASCULAR: RRR, no JVD, no peripheral edema   ABDOMEN: Nontender to palpation, normoactive BS, no guarding/rigidity  MUSCLOSKELETAL:  no clubbing/cyanosis, no joint swelling or tenderness to palpation  PSYCH: A+O x 3, affect normal  NEUROLOGY: CN 2-12 are intact and symmetric; no gross sensory or motor deficits  SKIN: No rashes; no palpable lesions    LABS:                        7.5    3.96  )-----------( 361      ( 17 Apr 2023 07:24 )             23.0     04-17    134<L>  |  101  |  7   ----------------------------<  112<H>  3.5   |  23  |  0.75    Ca    8.1<L>      17 Apr 2023 07:24  Phos  2.7     04-17  Mg     1.6     04-17                  RADIOLOGY & ADDITIONAL TESTS:  Results Reviewed:   Imaging Personally Reviewed:  Electrocardiogram Personally Reviewed:    COORDINATION OF CARE:  Care Discussed with Consultants/Other Providers [Y/N]: Gi team  Prior or Outpatient Records Reviewed [Y/N]:  
Andrey Bowen MD  Division of Hospital Medicine  Available on MS teams until 7pm  If no response or off-hours, page [phone]  -------------------------------------    Patient is a 78y old  Female who presents with a chief complaint of diarrhea, fatigue (17 Apr 2023 17:46)    SUBJECTIVE / OVERNIGHT EVENTS: none acute  ADDITIONAL REVIEW OF SYSTEMS: pt passing gas, tolerating PO, no priyank BM. No pain/n/v. Eager to go home, has not been able to seep in the hospital.    MEDICATIONS  (STANDING):  cholecalciferol 1000 Unit(s) Oral daily  enoxaparin Injectable 40 milliGRAM(s) SubCutaneous every 24 hours  lactated ringers. 1000 milliLiter(s) (100 mL/Hr) IV Continuous <Continuous>  metoclopramide 10 milliGRAM(s) Oral four times a day  polyethylene glycol 3350 17 Gram(s) Oral daily  simethicone 80 milliGRAM(s) Chew two times a day  simvastatin 10 milliGRAM(s) Oral at bedtime    MEDICATIONS  (PRN):  acetaminophen     Tablet .. 650 milliGRAM(s) Oral every 6 hours PRN Temp greater or equal to 38C (100.4F), Mild Pain (1 - 3), Moderate Pain (4 - 6), Severe Pain (7 - 10)      CAPILLARY BLOOD GLUCOSE        I&O's Summary    17 Apr 2023 07:01  -  18 Apr 2023 07:00  --------------------------------------------------------  IN: 800 mL / OUT: 0 mL / NET: 800 mL        PHYSICAL EXAM:  Vital Signs Last 24 Hrs  T(C): 36.8 (18 Apr 2023 11:44), Max: 37 (18 Apr 2023 05:04)  T(F): 98.3 (18 Apr 2023 11:44), Max: 98.6 (18 Apr 2023 05:04)  HR: 89 (18 Apr 2023 11:44) (85 - 89)  BP: 114/62 (18 Apr 2023 11:44) (109/65 - 122/77)  BP(mean): --  RR: 18 (18 Apr 2023 11:44) (18 - 18)  SpO2: 94% (18 Apr 2023 11:44) (92% - 95%)    Parameters below as of 18 Apr 2023 11:44  Patient On (Oxygen Delivery Method): room air      CONSTITUTIONAL: NAD  EYES: PERRLA; conjunctiva and sclera clear  ENMT: MMM  NECK: Supple  RESPIRATORY: Normal respiratory effort; CTAB  CARDIOVASCULAR: RRR, no JVD, no peripheral edema   ABDOMEN: Nontender to palpation, normoactive BS, no guarding/rigidity  MUSCLOSKELETAL:  no clubbing/cyanosis, no joint swelling or tenderness to palpation  PSYCH: A+O x 3, affect normal  NEUROLOGY: CN 2-12 are intact and symmetric; no gross sensory or motor deficits  SKIN: No rashes; no palpable lesions    LABS:                        7.6    4.33  )-----------( 368      ( 18 Apr 2023 07:17 )             23.8     04-18    137  |  102  |  7   ----------------------------<  104<H>  3.6   |  25  |  0.68    Ca    8.4      18 Apr 2023 07:17  Phos  2.7     04-17  Mg     1.6     04-17                  RADIOLOGY & ADDITIONAL TESTS:  Results Reviewed:   Imaging Personally Reviewed:  Electrocardiogram Personally Reviewed:    COORDINATION OF CARE:  Care Discussed with Consultants/Other Providers [Y/N]: PCP Dr. colby peacock  Prior or Outpatient Records Reviewed [Y/N]:  
Andrey Bowen MD  Division of Hospital Medicine  Available on MS teams until 7pm  If no response or off-hours, page [phone]  -------------------------------------    Patient is a 78y old  Female who presents with a chief complaint of diarrhea, fatigue (19 Apr 2023 11:19)      SUBJECTIVE / OVERNIGHT EVENTS: episode of lightheadedness yesterday so did not go home  ADDITIONAL REVIEW OF SYSTEMS: had multiple good BM's today- feels better, but overall tired from prolonged hospital stay. Woudl like to go home today.    MEDICATIONS  (STANDING):  cholecalciferol 1000 Unit(s) Oral daily  enoxaparin Injectable 40 milliGRAM(s) SubCutaneous every 24 hours  lactated ringers. 1000 milliLiter(s) (100 mL/Hr) IV Continuous <Continuous>  metoclopramide 10 milliGRAM(s) Oral four times a day  polyethylene glycol 3350 17 Gram(s) Oral daily  simethicone 80 milliGRAM(s) Chew two times a day  simvastatin 10 milliGRAM(s) Oral at bedtime    MEDICATIONS  (PRN):  acetaminophen     Tablet .. 650 milliGRAM(s) Oral every 6 hours PRN Temp greater or equal to 38C (100.4F), Mild Pain (1 - 3), Moderate Pain (4 - 6), Severe Pain (7 - 10)      CAPILLARY BLOOD GLUCOSE      POCT Blood Glucose.: 118 mg/dL (18 Apr 2023 17:13)    I&O's Summary    19 Apr 2023 07:01  -  19 Apr 2023 16:38  --------------------------------------------------------  IN: 480 mL / OUT: 1 mL / NET: 479 mL        PHYSICAL EXAM:  Vital Signs Last 24 Hrs  T(C): 36.8 (19 Apr 2023 11:41), Max: 36.8 (19 Apr 2023 11:41)  T(F): 98.3 (19 Apr 2023 11:41), Max: 98.3 (19 Apr 2023 11:41)  HR: 105 (19 Apr 2023 11:41) (85 - 105)  BP: 116/66 (19 Apr 2023 11:41) (113/63 - 119/68)  BP(mean): --  RR: 18 (19 Apr 2023 11:41) (18 - 18)  SpO2: 93% (19 Apr 2023 11:41) (93% - 93%)    Parameters below as of 19 Apr 2023 11:41  Patient On (Oxygen Delivery Method): room air      CONSTITUTIONAL: NAD  EYES: PERRLA; conjunctiva and sclera clear  ENMT: MMM  NECK: Supple  RESPIRATORY: Normal respiratory effort; CTAB  CARDIOVASCULAR: RRR, no JVD, no peripheral edema   ABDOMEN: Nontender to palpation, normoactive BS, no guarding/rigidity  MUSCLOSKELETAL:  no clubbing/cyanosis, no joint swelling or tenderness to palpation  PSYCH: A+O x 3, affect normal  NEUROLOGY: CN 2-12 are intact and symmetric; no gross sensory or motor deficits  SKIN: No rashes; no palpable lesions    LABS:                        7.6    4.33  )-----------( 368      ( 18 Apr 2023 07:17 )             23.8     04-18    137  |  102  |  7   ----------------------------<  104<H>  3.6   |  25  |  0.68    Ca    8.4      18 Apr 2023 07:17                  RADIOLOGY & ADDITIONAL TESTS:  Results Reviewed:   Imaging Personally Reviewed:  Electrocardiogram Personally Reviewed:    COORDINATION OF CARE:  Care Discussed with Consultants/Other Providers [Y/N]:  Prior or Outpatient Records Reviewed [Y/N]:  
[facility] Division of Hospital Medicine  Kim Fletcher MD  Available via MS Teams    SUBJECTIVE / OVERNIGHT EVENTS: No acute events overnight. Patient feeling better overall this morning. Still with some watery diarrhea but improving overall. No abdominal pain. Simethicone helping with bloating sensation. Denies any other concerns or complaints at this time.     Review of Systems:   CONSTITUTIONAL: No fever   EYES: No eye pain, visual disturbances, or discharge  ENMT: No difficulty hearing   RESPIRATORY: No SOB. No cough   CARDIOVASCULAR: No chest pain   GASTROINTESTINAL: mild abdominal discomfort. No nausea, vomiting, or hematemesis; watery diarrhea   GENITOURINARY: No dysuria   NEUROLOGICAL: No headaches   SKIN: No itching    MUSCULOSKELETAL: No joint pain or swelling; No muscle or back pain  PSYCHIATRIC: No depression or anxiety   HEME/LYMPH: No easy bruising or bleeding gums      MEDICATIONS  (STANDING):  cholecalciferol 1000 Unit(s) Oral daily  enoxaparin Injectable 40 milliGRAM(s) SubCutaneous every 24 hours  lactated ringers. 1000 milliLiter(s) (100 mL/Hr) IV Continuous <Continuous>  piperacillin/tazobactam IVPB.. 3.375 Gram(s) IV Intermittent every 8 hours  simvastatin 10 milliGRAM(s) Oral at bedtime    MEDICATIONS  (PRN):  simethicone 80 milliGRAM(s) Chew three times a day PRN Gas      PHYSICAL EXAM:  Vital Signs Last 24 Hrs  T(C): 36.7 (15 Apr 2023 13:50), Max: 37.2 (15 Apr 2023 04:44)  T(F): 98 (15 Apr 2023 13:50), Max: 99 (15 Apr 2023 04:44)  HR: 89 (15 Apr 2023 13:50) (89 - 96)  BP: 127/78 (15 Apr 2023 13:50) (120/72 - 127/78)  RR: 18 (15 Apr 2023 13:50) (18 - 18)  SpO2: 93% (15 Apr 2023 13:50) (91% - 93%)      CONSTITUTIONAL: NAD, well-developed   EYES: PERRLA; conjunctiva and sclera clear  ENMT: Moist oral mucosa, no pharyngeal injection or exudates   NECK: Supple   RESPIRATORY: Normal respiratory effort; lungs are clear to auscultation bilaterally  CARDIOVASCULAR: Regular rate and rhythm, normal S1 and S2, no murmur   ABDOMEN: mild tenderness to palpation diffusely, normoactive bowel sounds   MUSCULOSKELETAL: no clubbing or cyanosis of digits; no joint swelling or tenderness to palpation  PSYCH: A+O to person, place, and time; affect appropriate  NEUROLOGY: no gross sensory deficits   SKIN: No rashes     LABS:                        7.9    4.22  )-----------( 275      ( 15 Apr 2023 06:54 )             24.3     04-15    133<L>  |  100  |  11  ----------------------------<  117<H>  3.1<L>   |  20<L>  |  0.97    Ca    7.5<L>      15 Apr 2023 06:54  Phos  2.3     04-15  Mg     1.4     04-15      Culture - Stool (collected 13 Apr 2023 14:53)  Source: .Stool Feces  Preliminary Report (14 Apr 2023 16:32):    No enteric pathogens to date: Final culture pending    Culture - Urine (collected 13 Apr 2023 09:13)  Source: Clean Catch Clean Catch (Midstream)  Final Report (14 Apr 2023 17:10):    No growth    Culture - Blood (collected 12 Apr 2023 22:15)  Source: .Blood Blood-Venous  Preliminary Report (14 Apr 2023 03:03):    No growth to date.    Culture - Blood (collected 12 Apr 2023 22:00)  Source: .Blood Blood-Venous  Preliminary Report (14 Apr 2023 03:03):    No growth to date.      COVID-19 PCR: NotDetec (13 Apr 2023 00:13)      RADIOLOGY & ADDITIONAL TESTS:  New Imaging Personally Reviewed Today:  New Electrocardiogram Personally Reviewed Today:  Other Results Reviewed Today:   Prior or Outpatient Records Reviewed Today with Summary:    COORDINATION OF CARE:  Consultant Communication and Details of Discussion (where applicable):    
[facility] Division of Hospital Medicine  Kim Fletcher MD  Available via MS Teams    SUBJECTIVE / OVERNIGHT EVENTS: No acute events overnight. Patient feeling better overall. Still with watery diarrhea but overall improving. Denies any other concerns or complaints at this time.     Review of Systems:   CONSTITUTIONAL: No fever   EYES: No eye pain, visual disturbances, or discharge  ENMT: No difficulty hearing   RESPIRATORY: No SOB. No cough   CARDIOVASCULAR: No chest pain   GASTROINTESTINAL: mild lower abdominal discomfort. No nausea, vomiting, or hematemesis; watery diarrhea.   GENITOURINARY: No dysuria   NEUROLOGICAL: No headache   SKIN: No itching    MUSCULOSKELETAL: No joint pain or swelling; No muscle or back pain  PSYCHIATRIC: No depression or anxiety   HEME/LYMPH: No easy bruising or bleeding gums    MEDICATIONS  (STANDING):  cholecalciferol 1000 Unit(s) Oral daily  enoxaparin Injectable 40 milliGRAM(s) SubCutaneous every 24 hours  lactated ringers. 1000 milliLiter(s) (100 mL/Hr) IV Continuous <Continuous>  piperacillin/tazobactam IVPB.. 3.375 Gram(s) IV Intermittent every 8 hours  simvastatin 10 milliGRAM(s) Oral at bedtime    MEDICATIONS  (PRN):  acetaminophen     Tablet .. 650 milliGRAM(s) Oral every 6 hours PRN Temp greater or equal to 38C (100.4F), Mild Pain (1 - 3), Moderate Pain (4 - 6), Severe Pain (7 - 10)  simethicone 80 milliGRAM(s) Chew three times a day PRN Gas      PHYSICAL EXAM:  Vital Signs Last 24 Hrs  T(C): 36.5 (16 Apr 2023 04:25), Max: 37.2 (15 Apr 2023 22:18)  T(F): 97.7 (16 Apr 2023 04:25), Max: 99 (15 Apr 2023 22:18)  HR: 82 (16 Apr 2023 04:25) (82 - 87)  BP: 111/73 (16 Apr 2023 04:25) (104/63 - 111/73)  RR: 18 (16 Apr 2023 04:25) (18 - 18)  SpO2: 93% (16 Apr 2023 04:25) (93% - 94%)    Parameters below as of 16 Apr 2023 04:25  Patient On (Oxygen Delivery Method): room air      CONSTITUTIONAL: NAD, well-developed   EYES: PERRLA; conjunctiva and sclera clear  ENMT: Moist oral mucosa, no pharyngeal injection or exudates   NECK: Supple   RESPIRATORY: Normal respiratory effort; lungs are clear to auscultation bilaterally  CARDIOVASCULAR: Regular rate and rhythm, normal S1 and S2, no murmur   ABDOMEN: mild tenderness to palpation, normoactive bowel sounds   MUSCULOSKELETAL: no clubbing or cyanosis of digits; no joint swelling or tenderness to palpation  PSYCH: A+O to person, place, and time; affect appropriate  NEUROLOGY: no gross sensory deficits   SKIN: No rashes    LABS:                        7.4    3.21  )-----------( 311      ( 16 Apr 2023 07:04 )             23.0     04-16    137  |  106  |  10  ----------------------------<  114<H>  3.9   |  20<L>  |  0.82    Ca    7.7<L>      16 Apr 2023 07:04  Phos  3.5     04-16  Mg     2.0     04-16      Culture - Stool (collected 13 Apr 2023 14:53)  Source: .Stool Feces  Final Report (15 Apr 2023 21:09):    No enteric pathogens isolated.    (Stool culture examined for Salmonella,    Shigella, Campylobacter, Aeromonas, Plesiomonas,    Vibrio, E.coli O157 and Yersinia)      COVID-19 PCR: NotDetec (13 Apr 2023 00:13)      RADIOLOGY & ADDITIONAL TESTS:  New Imaging Personally Reviewed Today:  New Electrocardiogram Personally Reviewed Today:  Other Results Reviewed Today:   Prior or Outpatient Records Reviewed Today with Summary:    COORDINATION OF CARE:  Consultant Communication and Details of Discussion (where applicable):

## 2023-04-19 NOTE — PROGRESS NOTE ADULT - PROBLEM SELECTOR PLAN 1
Tmax of 103 with tachycardia on arrival with diarrhea at home  s/p 1L fluid in the ED   blood cx from prior admission     - was on cefepime 2g q8h and flagyl 500mg q8h (penicillin allergy); patient had been refusing flagyl due to side effect in the past; per ID, will change to zosyn for now; will monitor for any allergic reactions while on zosyn  - continue zosyn till 4/17 per ID recs   - no longer on PO vanc 125mg q6h per ID; likely all related to colitis infection   - c diff sent; negative   - blood cultures from 4/12 neg  - GI PCR neg; stool cx neg    - monitor fever curve  - monitor WBC  - f/u ID and GI recs  - likely DC home after completing IV abx on 4/18 (will complete abx 10pm on 4/17)
Tmax of 103 with tachycardia on arrival with diarrhea at home  s/p 1L fluid in the ED   blood cx from prior admission     - was on cefepime 2g q8h and flagyl 500mg q8h (penicillin allergy); patient has been refusing flagyl due to side effect in the past; per ID, will change to zosyn for now; will monitor for any allergic reactions while on zosyn  - no longer on PO vanc 125mg q6h per ID; likely all related to colitis infection   - can consider sending c diff if having more than 5 BM per day   - maintenance fluids   - blood cultures from 4/12 neg  - f/u GI PCR, stool ova and parasites   - monitor fever curve  - monitor WBC  - f/u ID and GI recs
Tmax of 103 with tachycardia on arrival with diarrhea at home, resolved  s/p 1L fluid in the ED   blood cx from prior admission     - was on cefepime 2g q8h and flagyl 500mg q8h (penicillin allergy); patient had been refusing flagyl due to side effect in the past; per ID, will change to zosyn for now; will monitor for any allergic reactions while on zosyn  - s/p zosyn  - no longer on PO vanc 125mg q6h per ID; likely all related to colitis infection   - c diff sent; negative   - blood cultures from 4/12 neg  - GI PCR neg; stool cx neg    - f/u ID and GI recs  - likely DC home after completing IV abx on 4/18 (will complete abx 10pm on 4/17)
Tmax of 103 with tachycardia on arrival with diarrhea at home, resolved  s/p 1L fluid in the ED   blood cx from prior admission     - was on cefepime 2g q8h and flagyl 500mg q8h (penicillin allergy); patient had been refusing flagyl due to side effect in the past; per ID, will change to zosyn for now; will monitor for any allergic reactions while on zosyn  - continue zosyn till 4/17 per ID recs   - no longer on PO vanc 125mg q6h per ID; likely all related to colitis infection   - c diff sent; negative   - blood cultures from 4/12 neg  - GI PCR neg; stool cx neg    - f/u ID and GI recs  - likely DC home after completing IV abx on 4/18 (will complete abx 10pm on 4/17)
Tmax of 103 with tachycardia on arrival with diarrhea at home, resolved  s/p 1L fluid in the ED   blood cx from prior admission     - was on cefepime 2g q8h and flagyl 500mg q8h (penicillin allergy); patient had been refusing flagyl due to side effect in the past; per ID, will change to zosyn for now; will monitor for any allergic reactions while on zosyn  - s/p zosyn  - no longer on PO vanc 125mg q6h per ID; likely all related to colitis infection   - c diff sent; negative   - blood cultures from 4/12 neg  - GI PCR neg; stool cx neg    - f/u ID and GI recs  - likely DC home after completing IV abx on 4/18 (will complete abx 10pm on 4/17)
Tmax of 103 with tachycardia on arrival with diarrhea at home  s/p 1L fluid in the ED   blood cx from prior admission     - was on cefepime 2g q8h and flagyl 500mg q8h (penicillin allergy); patient has been refusing flagyl due to side effect in the past; per ID, will change to zosyn for now; will monitor for any allergic reactions while on zosyn  - continue zosyn till 4/17 per ID recs   - no longer on PO vanc 125mg q6h per ID; likely all related to colitis infection   - c diff sent; negative   - blood cultures from 4/12 neg  - GI PCR neg; f/u stool cx    - monitor fever curve  - monitor WBC  - f/u ID and GI recs

## 2023-04-19 NOTE — DISCHARGE NOTE NURSING/CASE MANAGEMENT/SOCIAL WORK - NSDCFUADDAPPT_GEN_ALL_CORE_FT
APPTS ARE READY TO BE MADE: [x ] YES    Best Family or Patient Contact (if needed):    Additional Information about above appointments (if needed):    1:   2:   3:     Other comments or requests:   Patient was provided with follow up request details for Dr. Clay Katz and Dr. Roscoe Gordon. Patient was advised to call to schedule follow up within specified time frame.

## 2023-04-19 NOTE — PROGRESS NOTE ADULT - NSPROGADDITIONALINFOA_GEN_ALL_CORE
Time-based billing (NON-critical care). Total minutes spent: 40 min    The necessity of the time spent during the encounter on this date of service was due to:     - Ordering, reviewing, and interpreting labs, testing, and imaging.  - Independently obtaining a review of systems and performing a physical exam  - Reviewing prior hospitalization and where necessary, outpatient records.  - Counselling and educating patient and family regarding interpretation of aforementioned items and plan of care.
Discussed with ACP, Jodi
Discussed with ACP, Arleen David
Discussed with ACP, Jodi

## 2023-04-19 NOTE — PROGRESS NOTE ADULT - PROBLEM SELECTOR PLAN 2
- plan as above  - f/u ID and GI recs  - hold reglan for now per GI recs
- plan as above  - f/u ID and GI recs  - hold reglan for now per GI recs    #Anemia- pt with significantly downtrended hb from 4/3 through now. No bleeding noted in stool. No signs of malignancy on CT a/p. Iron studies consistent with ACD. Also has been through prolonged daily hospital phlebotomy  - pt may have some chronic underlying condition however GI malignancy seems less likely  - she had CT a/p on 4/3 colonic wall thickening which was not seen on this repeat CT a/p- suspect this was infectious rather than malignant  - I discussed with PCP Dr. Draper- who will f/u on the anemia and manage as OP when pt follows up next week  - pt denies any symptoms from the anemia and declined the offer for transfusion
- plan as above  - f/u ID and GI recs  - hold reglan for now per GI recs
- plan as above  - f/u ID and GI recs  - hold reglan for now per GI recs
- plan as above  - f/u ID and GI recs  - hold reglan for now per GI recs    #Anemia- pt with significantly downtrended hb from 4/3 through now. No bleeding noted in stool. No signs of malignancy on CT a/p. Iron studies consistent with ACD. Also has been through prolonged daily hospital phlebotomy  - pt may have some chronic underlying condition however GI malignancy seems less likely  - she had CT a/p on 4/3 colonic wall thickening which was not seen on this repeat CT a/p- suspect this was infectious rather than malignant  - I discussed with PCP Dr. Draper- who will f/u on the anemia and manage as OP when pt follows up next week  - pt denies any symptoms from the anemia and declined the offer for transfusion  - will defer labs and FOBT now per discussion with GI team and in favor of discharge today per patient request
- plan as above  - f/u ID and GI recs

## 2023-04-19 NOTE — PROGRESS NOTE ADULT - PROBLEM SELECTOR PLAN 9
Diet: Regular   DVT Ppx: lovenox    DISPO: likely home today with f/u with Dr. Clay Draper next week, and GI f/u with Dr. Lorenzo  plan discussed with and approved by pt and her Granddaughter at bedside.
Diet: Regular   DVT Ppx: lovenox    DISPO: DC once completed with course of IV abx likely on 4/18
Diet: Regular   DVT Ppx: lovenox
Diet: Regular   DVT Ppx: lovenox    DISPO: DC once completed with course of IV abx
Diet: Regular   DVT Ppx: lovenox    DISPO: DC once completed with course of IV abx likely on 4/18  plan discussed with and approved by pt, daughter Bernie at bedside and GI ACP
Diet: Regular   DVT Ppx: lovenox    DISPO: likely home today with f/u with Dr. Clay Draper next week, and GI f/u with Dr. Lorenzo  plan discussed with and approved by pt Nikki and kavitha

## 2023-04-19 NOTE — PROGRESS NOTE ADULT - PROBLEM SELECTOR PLAN 3
reportedly has also been using miralax at home- will resume now that symptoms improved    - GI PCR neg, stool cx neg  - c diff neg    - blood cultures neg 4/12
reportedly has also been using miralax at home    - GI PCR neg, stool cx neg  - c diff neg    - blood cultures neg 4/12   - no bowel regimen
reportedly has also been using miralax at home    - GI PCR neg, f/u stool cx    - c diff neg    - blood cultures neg 4/12   - no bowel regimen
i/s/o recent antibiotic use, there is concern for C. diff   reportedly has also been using miralax at home    - f/u GI PCR, stool ova and parasites   - hold off on sending C diff test unless more than 5 BM/day   - maintenance fluids   - blood cultures neg 4/12   - no bowel regimen
reportedly has also been using miralax at home- will resume reglan/miralax now that patient's diarrhea is resolved    - GI PCR neg, stool cx neg  - c diff neg    - blood cultures neg 4/12
reportedly has also been using miralax at home- will resume reglan/miralax now that patient's diarrhea is resolved    - GI PCR neg, stool cx neg  - c diff neg    - blood cultures neg 4/12

## 2023-04-19 NOTE — DISCHARGE NOTE NURSING/CASE MANAGEMENT/SOCIAL WORK - PATIENT PORTAL LINK FT
You can access the FollowMyHealth Patient Portal offered by [facility] by registering at the following website: [email or web address]. By joining [employer]’s FollowMyHealth portal, you will also be able to view your health information using other applications (apps) compatible with our system.

## 2023-04-20 PROCEDURE — 36415 COLL VENOUS BLD VENIPUNCTURE: CPT

## 2023-04-20 PROCEDURE — 87150 DNA/RNA AMPLIFIED PROBE: CPT

## 2023-04-20 PROCEDURE — 81001 URINALYSIS AUTO W/SCOPE: CPT

## 2023-04-20 PROCEDURE — 84132 ASSAY OF SERUM POTASSIUM: CPT

## 2023-04-20 PROCEDURE — 84300 ASSAY OF URINE SODIUM: CPT

## 2023-04-20 PROCEDURE — 74018 RADEX ABDOMEN 1 VIEW: CPT

## 2023-04-20 PROCEDURE — 96375 TX/PRO/DX INJ NEW DRUG ADDON: CPT

## 2023-04-20 PROCEDURE — 84100 ASSAY OF PHOSPHORUS: CPT

## 2023-04-20 PROCEDURE — 87040 BLOOD CULTURE FOR BACTERIA: CPT

## 2023-04-20 PROCEDURE — 82565 ASSAY OF CREATININE: CPT

## 2023-04-20 PROCEDURE — 82962 GLUCOSE BLOOD TEST: CPT

## 2023-04-20 PROCEDURE — 87186 SC STD MICRODIL/AGAR DIL: CPT

## 2023-04-20 PROCEDURE — 85014 HEMATOCRIT: CPT

## 2023-04-20 PROCEDURE — 82947 ASSAY GLUCOSE BLOOD QUANT: CPT

## 2023-04-20 PROCEDURE — 83735 ASSAY OF MAGNESIUM: CPT

## 2023-04-20 PROCEDURE — 83036 HEMOGLOBIN GLYCOSYLATED A1C: CPT

## 2023-04-20 PROCEDURE — 71046 X-RAY EXAM CHEST 2 VIEWS: CPT

## 2023-04-20 PROCEDURE — 82330 ASSAY OF CALCIUM: CPT

## 2023-04-20 PROCEDURE — 71045 X-RAY EXAM CHEST 1 VIEW: CPT

## 2023-04-20 PROCEDURE — 84156 ASSAY OF PROTEIN URINE: CPT

## 2023-04-20 PROCEDURE — 80053 COMPREHEN METABOLIC PANEL: CPT

## 2023-04-20 PROCEDURE — 87077 CULTURE AEROBIC IDENTIFY: CPT

## 2023-04-20 PROCEDURE — 83935 ASSAY OF URINE OSMOLALITY: CPT

## 2023-04-20 PROCEDURE — 84540 ASSAY OF URINE/UREA-N: CPT

## 2023-04-20 PROCEDURE — 96374 THER/PROPH/DIAG INJ IV PUSH: CPT

## 2023-04-20 PROCEDURE — 82803 BLOOD GASES ANY COMBINATION: CPT

## 2023-04-20 PROCEDURE — 82435 ASSAY OF BLOOD CHLORIDE: CPT

## 2023-04-20 PROCEDURE — 87086 URINE CULTURE/COLONY COUNT: CPT

## 2023-04-20 PROCEDURE — 80048 BASIC METABOLIC PNL TOTAL CA: CPT

## 2023-04-20 PROCEDURE — 87637 SARSCOV2&INF A&B&RSV AMP PRB: CPT

## 2023-04-20 PROCEDURE — 85018 HEMOGLOBIN: CPT

## 2023-04-20 PROCEDURE — 97161 PT EVAL LOW COMPLEX 20 MIN: CPT

## 2023-04-20 PROCEDURE — 84443 ASSAY THYROID STIM HORMONE: CPT

## 2023-04-20 PROCEDURE — 84295 ASSAY OF SERUM SODIUM: CPT

## 2023-04-20 PROCEDURE — 96376 TX/PRO/DX INJ SAME DRUG ADON: CPT

## 2023-04-20 PROCEDURE — 83690 ASSAY OF LIPASE: CPT

## 2023-04-20 PROCEDURE — 87449 NOS EACH ORGANISM AG IA: CPT

## 2023-04-20 PROCEDURE — 84133 ASSAY OF URINE POTASSIUM: CPT

## 2023-04-20 PROCEDURE — 83605 ASSAY OF LACTIC ACID: CPT

## 2023-04-20 PROCEDURE — 99285 EMERGENCY DEPT VISIT HI MDM: CPT | Mod: 25

## 2023-04-20 PROCEDURE — 85027 COMPLETE CBC AUTOMATED: CPT

## 2023-04-20 PROCEDURE — 82570 ASSAY OF URINE CREATININE: CPT

## 2023-04-20 PROCEDURE — 85025 COMPLETE CBC W/AUTO DIFF WBC: CPT

## 2023-04-20 PROCEDURE — 74176 CT ABD & PELVIS W/O CONTRAST: CPT | Mod: MA

## 2023-04-20 PROCEDURE — 87324 CLOSTRIDIUM AG IA: CPT

## 2023-06-21 ENCOUNTER — APPOINTMENT (OUTPATIENT)
Dept: ENDOCRINOLOGY | Facility: CLINIC | Age: 78
End: 2023-06-21
Payer: MEDICARE

## 2023-06-21 VITALS
OXYGEN SATURATION: 96 % | HEART RATE: 86 BPM | BODY MASS INDEX: 23 KG/M2 | WEIGHT: 125 LBS | SYSTOLIC BLOOD PRESSURE: 120 MMHG | DIASTOLIC BLOOD PRESSURE: 64 MMHG | HEIGHT: 62 IN

## 2023-06-21 PROCEDURE — ZZZZZ: CPT

## 2023-06-21 PROCEDURE — 99214 OFFICE O/P EST MOD 30 MIN: CPT | Mod: 25

## 2023-06-21 PROCEDURE — 77080 DXA BONE DENSITY AXIAL: CPT | Mod: GA

## 2023-06-21 RX ORDER — RISEDRONATE SODIUM 35 MG/1
35 TABLET, FILM COATED ORAL
Qty: 4 | Refills: 3 | Status: ACTIVE | COMMUNITY
Start: 2023-06-21 | End: 1900-01-01

## 2023-06-21 NOTE — PROCEDURE
[FreeTextEntry1] : Bone mineral density: 06/21/2023\par indication:compared to 2022 prior test showed rapid bone loss\par spine -1.5 osteopenia +7.0% no obvious arthritic artifact\par total hip -2.7 osteoporosis -4.2%\par femoral neck -2.4 osteoporosis -7.2%\par proximal radius -2.2 osteopenia no significant change\par \par Bone Density July 21, 2022\par Indication vs 2021 Asses response to medication \par Spine-2.1 osteoporosis -4.8%\par Total Hip-2.5 osteoporosis , no significant change \par Femoral Neck -2.0 osteopenia , no significant change \par Proximal Radius -2.3 osteopenia , no significant change \par \par Thyroid US - 03/12/2021\par heterogenous, enlarged, no nodules\par \par Bone mineral density: 02/24/2020 \par Indication: vs. 2018\par Spine: -1.7 osteopenia, +5.2%\par Total hip: -2.4 osteopenia, no significant change\par Femoral neck: -1.9 osteopenia, +11.9%\par Proximal radius: -2.2 osteopenia, no significant change, improved vs. 2014 and 2016\par \par Bone mineral density 2//9/18\par indication:  assess response to medication prolia \par spine -2.1 osteopenia but Not accurate because of arthritis no significant change \par total hip -2.5 osteoporosis no significant change \par femoral neck -2.4 osteopenia +3.7%\par proximal radius -2.3.onnsc \par \par Bone mineral density August 17, 2016\par 2 year follow-up\par Spine -2.1, osteopenia, no significant change \par Total hi[p -2.6, osteoporosis, -4.6% \par Femoral neck -2.6, osteoporosis, -8.6%\par Proximal radius -2.5, osteoporosis, no significant change\par \par thyroid US 5/22/15\par small goiter, heterogenous, no nodules\par \par bone mineral density performed May 20, 2014\par Indication assess response to intravenous therapy\par Spine -2.2, osteopenia, no significant change versus 2013\par Total hip -2.4, osteopenia, no significant change versus 2013\par Femoral neck - 2.2, osteopenia, no significant change versus 2013\par Proximal radius -2.8, osteoporosis, no significant change versus 2013

## 2023-06-21 NOTE — ASSESSMENT
[Denosumab Therapy] : Risks  and benefits of denosumab therapy were discussed with the patient including eczema, cellulitis, osteonecrosis of the jaw and atypical femur fractures [Bisphosphonate Therapy] : Risks and benefits of bisphosphonate therapy were  discussed with the patient including gastroesophageal irritation, osteonecrosis of the jaw, and atypical femur fractures, and acute phase reaction [Bisphosphonates] : The patient was instructed to take bisphosphonates on an empty stomach with a full glass of water,and wait at least 30 minutes before eating or lying down [FreeTextEntry1] : 78 year-old female with \par \par 1. Postmenopausal osteoporosis: h/o low bone density. Last dose of IV Reclast 2012. BMD significantly decreased in femoral neck and total hip vs prior study. Options of therapy reviewed. Pt initially elected to take Reclast as she tolerated this well in the past. Referred to rheumatology but decided not to take Reclast on advice of DDS. Began Prolia January 2017. Tolerating well. No thigh pain, no interval fx. No ON . BMD July 21,2022 generally stable except for decrease in spine.  However it appears that the previous value 2020 was probably an outlier in the bone density overall stable in the spine\par The patient cannot get Prolia winter 2022 as expected due to concerns about dental work which has not yet been completed.\par Bone density 2023 shows declining values in hip .  Increase in bone density in spine is difficult to explain.\par Patient advised of risk of fracture if she does not begin medical therapy for osteoporosis.  Given her need for upcoming dental work she would be a good candidate for anabolic therapy first but she declines.  She is willing to try short-term Actonel 35 mg a week weekly with the expectation that she will stop the medication in advance of upcoming dental work.  She can then resume therapy once dental work is completed whether it is Prolia or bisphosphonate.  Weekly bisphosphonate therapy will allow rapid titration of dose.\par 2. Small goiter on exam: pt clinically and chemically euthyroid. US shows no nodules. No local neck pain. No dysphagia or dysphonia. No raciness, shakiness, heat/cold intolerance, tiredness, or fatigue. No palpitations, tremors, or sudden weight gain/loss. TFTs Feb 2020 normal. TUS 3/2021 indicates heterogenous, enlarged, no nodules. Observe.\par \par 3. H/o low Vit D, on Vitamin D 2000 UI. plus 50k once weekly.\par \par F/u in 2 months

## 2023-06-21 NOTE — HISTORY OF PRESENT ILLNESS
[Zoledronic Acid (Zometa)] : Zoledronic Acid [Prolia (Denosumab)] : Prolia (Denosumab) [FreeTextEntry1] : Patient had admission to the hospital 2023 for E. coli sepsis.  Recently had stress fractures of the, has seen podiatry who is treating conservatively.\par Patient was last seen 1 year ago.  Last Prolia 2022.  Missed dose in winter due to ongoing dental work which is still not been completed.\par Needs completion of tooth extraction and possibly implant right lower incisors.\par \par \par hx\par . Pt had 2 doses Reclast last 2012. No interval fx.  Changed to Prolia 2017. Tolerating well. No thigh pain, no interval fx. Last DDS within past 3 months. No ONJ.Since the last visit pt has no significant health change. No interval surgeries, fractures, health change, or change in medications.Pt reports having partial implants. \par \par  Active GERD, has recent EGD, not ideal for oral treatment. \par \par Small goiter. Clinically euthyroid. Takes chewable calcium\par H/o of low Vit D currently on Vitamin D 2000 UI daily.\par  ill bladder Ca, pt  2020.

## 2023-06-21 NOTE — PHYSICAL EXAM
[Alert] : alert [Well Nourished] : well nourished [No Acute Distress] : no acute distress [Well Developed] : well developed [Normal Sclera/Conjunctiva] : normal sclera/conjunctiva [EOMI] : extra ocular movement intact [No Proptosis] : no proptosis [Normal Oropharynx] : the oropharynx was normal [Thyroid Not Enlarged] : the thyroid was not enlarged [No Thyroid Nodules] : no palpable thyroid nodules [No Respiratory Distress] : no respiratory distress [No Accessory Muscle Use] : no accessory muscle use [Clear to Auscultation] : lungs were clear to auscultation bilaterally [Normal S1, S2] : normal S1 and S2 [Normal Rate] : heart rate was normal [Regular Rhythm] : with a regular rhythm [No Edema] : no peripheral edema [Pedal Pulses Normal] : the pedal pulses are present [Normal Bowel Sounds] : normal bowel sounds [Not Tender] : non-tender [Not Distended] : not distended [Soft] : abdomen soft [Normal Anterior Cervical Nodes] : no anterior cervical lymphadenopathy [No Spinal Tenderness] : no spinal tenderness [Spine Straight] : spine straight [No Stigmata of Cushings Syndrome] : no stigmata of Cushings Syndrome [Normal Gait] : normal gait [Normal Strength/Tone] : muscle strength and tone were normal [No Rash] : no rash [Normal Reflexes] : deep tendon reflexes were 2+ and symmetric [No Tremors] : no tremors [Oriented x3] : oriented to person, place, and time [Acanthosis Nigricans] : no acanthosis nigricans [de-identified] : Missing right lower incisor [de-identified] : minimal goiter [de-identified] : feet wrapped in ace bandages from podiatry

## 2023-06-30 ENCOUNTER — APPOINTMENT (OUTPATIENT)
Dept: ORTHOPEDIC SURGERY | Facility: CLINIC | Age: 78
End: 2023-06-30
Payer: MEDICARE

## 2023-06-30 DIAGNOSIS — M77.51 OTHER ENTHESOPATHY OF RT FOOT AND ANKLE: ICD-10-CM

## 2023-06-30 DIAGNOSIS — M77.52 OTHER ENTHESOPATHY OF LT FOOT AND ANKLE: ICD-10-CM

## 2023-06-30 PROCEDURE — 73630 X-RAY EXAM OF FOOT: CPT | Mod: 50

## 2023-06-30 PROCEDURE — 99203 OFFICE O/P NEW LOW 30 MIN: CPT

## 2023-06-30 NOTE — ASSESSMENT
[FreeTextEntry1] : WBAT in supportive footwear, shoe modifications discussed.\par Voltaren gel as directed. \par Ice to affected area.

## 2023-06-30 NOTE — PHYSICAL EXAM
[Left] : left foot and ankle [Right] : right foot and ankle [NL (40)] : plantar flexion 40 degrees [NL 30)] : inversion 30 degrees [NL (20)] : eversion 20 degrees [5___] : eversion 5[unfilled]/5 [2+] : posterior tibialis pulse: 2+ [Normal] : saphenous nerve sensation normal [Mild] : mild swelling of dorsal foot [1+] : posterior tibialis pulse: 1+ [5th] : 5th [] : dorsal pain with resisted toe extension [Bilateral] : foot bilaterally [Weight -] : weightbearing [FreeTextEntry8] : Minimal tenderness 5th MTP joint.  [de-identified] : Mild hallux valgus on the RT.

## 2023-06-30 NOTE — HISTORY OF PRESENT ILLNESS
[7] : 7 [Walking] : walking [de-identified] : Pt. is a 78 year old female who presents for evaluation of both feet. Denies trauma. Symptoms since early May 2023. She has been under care of podiatry. Diagnosed with stress fractures in both feet. She reports she has not improved. RT foot pain chiefly lateral aspect, LT foot pain is dorsal. WB in flats, soft wraps applied by podiatrist. No previous injury/problem with either foot.  [] : no [FreeTextEntry1] : B/L feet

## 2023-07-24 ENCOUNTER — RX RENEWAL (OUTPATIENT)
Age: 78
End: 2023-07-24

## 2023-08-11 ENCOUNTER — APPOINTMENT (OUTPATIENT)
Dept: ORTHOPEDIC SURGERY | Facility: CLINIC | Age: 78
End: 2023-08-11

## 2023-08-14 ENCOUNTER — APPOINTMENT (OUTPATIENT)
Dept: ULTRASOUND IMAGING | Facility: CLINIC | Age: 78
End: 2023-08-14

## 2023-08-14 ENCOUNTER — OUTPATIENT (OUTPATIENT)
Dept: OUTPATIENT SERVICES | Facility: HOSPITAL | Age: 78
LOS: 1 days | End: 2023-08-14
Payer: MEDICARE

## 2023-08-14 ENCOUNTER — APPOINTMENT (OUTPATIENT)
Dept: MAMMOGRAPHY | Facility: CLINIC | Age: 78
End: 2023-08-14
Payer: MEDICARE

## 2023-08-14 DIAGNOSIS — Z90.49 ACQUIRED ABSENCE OF OTHER SPECIFIED PARTS OF DIGESTIVE TRACT: Chronic | ICD-10-CM

## 2023-08-14 DIAGNOSIS — Z00.00 ENCOUNTER FOR GENERAL ADULT MEDICAL EXAMINATION WITHOUT ABNORMAL FINDINGS: ICD-10-CM

## 2023-08-14 PROCEDURE — G0279: CPT

## 2023-08-14 PROCEDURE — G0279: CPT | Mod: 26

## 2023-08-14 PROCEDURE — 76641 ULTRASOUND BREAST COMPLETE: CPT

## 2023-08-14 PROCEDURE — 77066 DX MAMMO INCL CAD BI: CPT

## 2023-08-14 PROCEDURE — 77066 DX MAMMO INCL CAD BI: CPT | Mod: 26

## 2023-08-14 PROCEDURE — 76641 ULTRASOUND BREAST COMPLETE: CPT | Mod: 26,50,GY

## 2023-08-28 ENCOUNTER — APPOINTMENT (OUTPATIENT)
Dept: ENDOCRINOLOGY | Facility: CLINIC | Age: 78
End: 2023-08-28
Payer: MEDICARE

## 2023-08-28 PROCEDURE — 96372 THER/PROPH/DIAG INJ SC/IM: CPT

## 2023-08-28 RX ADMIN — DENOSUMAB 60 MG/ML: 60 INJECTION SUBCUTANEOUS at 00:00

## 2023-08-29 RX ORDER — DENOSUMAB 60 MG/ML
60 INJECTION SUBCUTANEOUS
Qty: 1 | Refills: 0 | Status: COMPLETED | OUTPATIENT
Start: 2023-08-28

## 2023-08-30 ENCOUNTER — APPOINTMENT (OUTPATIENT)
Dept: ORTHOPEDIC SURGERY | Facility: CLINIC | Age: 78
End: 2023-08-30
Payer: MEDICARE

## 2023-08-30 DIAGNOSIS — M17.11 UNILATERAL PRIMARY OSTEOARTHRITIS, RIGHT KNEE: ICD-10-CM

## 2023-08-30 PROCEDURE — 20610 DRAIN/INJ JOINT/BURSA W/O US: CPT | Mod: RT

## 2023-08-30 PROCEDURE — 99204 OFFICE O/P NEW MOD 45 MIN: CPT | Mod: 25

## 2023-08-30 PROCEDURE — 73562 X-RAY EXAM OF KNEE 3: CPT | Mod: RT

## 2023-08-30 NOTE — PHYSICAL EXAM
[de-identified] : Constitutional: Well-nourished, well-developed, No acute distress Respiratory:  Good respiratory effort, no SOB Lymphatic: No regional lymphadenopathy, no lymphedema Psychiatric: Pleasant and normal affect, alert and oriented x3 Skin: Clean dry and intact B/L LE Musculoskeletal: normal except where as noted in regional exam  Right Knee: APPEARANCE: no marked deformities, no swelling or malalignment POSITIVE TENDERNESS:  + crepitus of the anterior knee, and tenderness of patellar retinaculum NONTENDER: jt lines b/l, patellar & quadriceps tendons, MCL/LCL, ITB at the lateral femoral condyle & Gerdy's tubercle, pes bursa.  ROM: full & painless, although some discomfort in deep knee flexion RESISTIVE TESTING: + discomfort with knee ext from deep knee flexion (stretched position), painless knee flexion.  SPECIAL TESTS: stable v/v stress. painless grind. neg Lachman's. neg ant/post drawer. neg Freddy's.   [de-identified] : The following radiographs were ordered and read by me during this patient's visit. I reviewed each radiograph in detail with the patient and discussed the findings as highlighted below.   3 views of the right knee were obtained today that show degenerative changes and evidence of mild osteoarthritis.  No fracture, or dislocation seen at this time. There is no malalignment. No other obvious osseous abnormality. Otherwise unremarkable.

## 2023-08-30 NOTE — HISTORY OF PRESENT ILLNESS
[de-identified] : Patient is here for right knee pain. She reports having two stress fractures in bilateral feet in June for which she follows with an orthopedic doctor. She started to have right sided knee pain 2 weeks ago. Pain is localized to the medial knee, worse with walking or sitting for extended periods. Currently using Advil, which helps but pain returns if she stops the medication. Denies locking, catching of the knee. Denies any issues with the knee prior to this.   The patient's past medical history, past surgical history, medications and allergies were reviewed by me today and documented accordingly. In addition, the patient's family and social history, which were noncontributory to this visit, were reviewed also. Intake form was reviewed. The patient has no family history of arthritis.

## 2023-08-30 NOTE — DISCUSSION/SUMMARY
[de-identified] : Discussed findings of today's exam and possible causes of patient's pain.  Educated patient on their most probable diagnosis of chronic intermittent right knee pain with recent atraumatic exacerbation due to underlying mild osteoarthritis and concomitant chondromalacia.  Reviewed possible courses of treatment, and we collaboratively decided best course of treatment at this time will include conservative management.  Patient has already tried regular oral NSAIDs without significant long-term relief.  We discussed various treatment options as well as associated risk/benefits/alternatives and patient elected to proceed with cortisone injection today (see procedure note).  Informed the patient that the numbing medicine in today's injection will last for about 4-6 hours. The steroid that was injected will start to work in 1 to 2 days, peak at 1-2 weeks, and may last up to 1-2 months.  Follow up as needed.  Patient appreciates and agrees with current plan.  I work as part of an academic orthopedic group and routinely have a physician in training (resident / fellow) working with me.  Any part of the history and physical exam performed by the physician in training was either directly reviewed and/or replicated by myself.  Any procedure performed by the physician in training was performed under my direct supervision and with the consent of the patient.  This note was generated using dragon medical dictation software.  A reasonable effort has been made for proofreading its contents, but typos may still remain.  If there are any questions or points of clarification needed please notify my office.

## 2023-08-30 NOTE — PROCEDURE
[de-identified] : Injection: Right knee joint. Indication: Osteoarthritis.  A discussion was had with the patient regarding this procedure and all questions were answered. All risks, benefits and alternatives were discussed. These included but were not limited to bleeding, infection, allergic reaction and reaccumulation of fluid. A timeout was done to ensure correct side and patient agreed to the procedure.  A [place] russell was created on the skin utilizing a plastic needle cap to russell the anticipated point of entry.   Alcohol was used to clean the skin, and Betadine was used to sterilize and prep the area in the lateral joint line aspect of the knee. Ethyl chloride spray was then used as a topical anesthetic. A 22-gauge needle was used to inject 2cc of 0.25% bupivacaine without epinephrine and 1cc of 40mg/ml methylprednisolone into the knee. A sterile bandage was then applied. The patient tolerated the procedure well.

## 2023-10-23 ENCOUNTER — APPOINTMENT (OUTPATIENT)
Dept: ULTRASOUND IMAGING | Facility: CLINIC | Age: 78
End: 2023-10-23
Payer: MEDICARE

## 2023-10-23 ENCOUNTER — OUTPATIENT (OUTPATIENT)
Dept: OUTPATIENT SERVICES | Facility: HOSPITAL | Age: 78
LOS: 1 days | End: 2023-10-23
Payer: MEDICARE

## 2023-10-23 DIAGNOSIS — Z90.49 ACQUIRED ABSENCE OF OTHER SPECIFIED PARTS OF DIGESTIVE TRACT: Chronic | ICD-10-CM

## 2023-10-23 DIAGNOSIS — Z00.00 ENCOUNTER FOR GENERAL ADULT MEDICAL EXAMINATION WITHOUT ABNORMAL FINDINGS: ICD-10-CM

## 2023-10-23 PROCEDURE — 76856 US EXAM PELVIC COMPLETE: CPT | Mod: 26

## 2023-10-23 PROCEDURE — 76700 US EXAM ABDOM COMPLETE: CPT

## 2023-10-23 PROCEDURE — 76856 US EXAM PELVIC COMPLETE: CPT

## 2023-10-23 PROCEDURE — 76700 US EXAM ABDOM COMPLETE: CPT | Mod: 26

## 2023-11-15 ENCOUNTER — APPOINTMENT (OUTPATIENT)
Dept: SURGERY | Facility: CLINIC | Age: 78
End: 2023-11-15
Payer: MEDICARE

## 2023-11-15 PROCEDURE — 99213K: CUSTOM

## 2024-02-23 ENCOUNTER — APPOINTMENT (OUTPATIENT)
Dept: MRI IMAGING | Facility: CLINIC | Age: 79
End: 2024-02-23
Payer: MEDICARE

## 2024-02-23 ENCOUNTER — OUTPATIENT (OUTPATIENT)
Dept: OUTPATIENT SERVICES | Facility: HOSPITAL | Age: 79
LOS: 1 days | End: 2024-02-23
Payer: MEDICARE

## 2024-02-23 DIAGNOSIS — Z90.49 ACQUIRED ABSENCE OF OTHER SPECIFIED PARTS OF DIGESTIVE TRACT: Chronic | ICD-10-CM

## 2024-02-23 DIAGNOSIS — Z00.00 ENCOUNTER FOR GENERAL ADULT MEDICAL EXAMINATION WITHOUT ABNORMAL FINDINGS: ICD-10-CM

## 2024-02-23 PROCEDURE — 77049 MRI BREAST C-+ W/CAD BI: CPT | Mod: 26,MH

## 2024-02-23 PROCEDURE — A9585: CPT

## 2024-02-23 PROCEDURE — C8937: CPT

## 2024-02-23 PROCEDURE — C8908: CPT | Mod: MH

## 2024-03-05 ENCOUNTER — APPOINTMENT (OUTPATIENT)
Dept: ENDOCRINOLOGY | Facility: CLINIC | Age: 79
End: 2024-03-05
Payer: MEDICARE

## 2024-03-05 VITALS
HEIGHT: 62 IN | HEART RATE: 92 BPM | OXYGEN SATURATION: 97 % | BODY MASS INDEX: 24.84 KG/M2 | WEIGHT: 135 LBS | DIASTOLIC BLOOD PRESSURE: 70 MMHG | SYSTOLIC BLOOD PRESSURE: 122 MMHG

## 2024-03-05 DIAGNOSIS — M81.0 AGE-RELATED OSTEOPOROSIS W/OUT CURRENT PATHOLOGICAL FRACTURE: ICD-10-CM

## 2024-03-05 DIAGNOSIS — E04.0 NONTOXIC DIFFUSE GOITER: ICD-10-CM

## 2024-03-05 PROCEDURE — 99214 OFFICE O/P EST MOD 30 MIN: CPT | Mod: 25

## 2024-03-05 PROCEDURE — 96372 THER/PROPH/DIAG INJ SC/IM: CPT

## 2024-03-05 RX ORDER — DENOSUMAB 60 MG/ML
60 INJECTION SUBCUTANEOUS
Qty: 1 | Refills: 0 | Status: COMPLETED | OUTPATIENT
Start: 2024-03-05

## 2024-03-05 RX ADMIN — DENOSUMAB 60 MG/ML: 60 INJECTION SUBCUTANEOUS at 00:00

## 2024-03-05 NOTE — END OF VISIT
[FreeTextEntry3] :  This note was written by Shea Dacosta on (March 5, 2024) acting as a medical scribe for Dr. Diana This note was authored by the medical scribe for me. I have reviewed, edited, and revised the note as needed. I am in agreement with the exam findings, imaging findings, and treatment plan.  Td Diana MD

## 2024-03-05 NOTE — PHYSICAL EXAM
[Alert] : alert [Well Nourished] : well nourished [No Acute Distress] : no acute distress [Well Developed] : well developed [Normal Sclera/Conjunctiva] : normal sclera/conjunctiva [No Proptosis] : no proptosis [EOMI] : extra ocular movement intact [Normal Oropharynx] : the oropharynx was normal [Thyroid Not Enlarged] : the thyroid was not enlarged [No Thyroid Nodules] : no palpable thyroid nodules [No Respiratory Distress] : no respiratory distress [No Accessory Muscle Use] : no accessory muscle use [Clear to Auscultation] : lungs were clear to auscultation bilaterally [Normal S1, S2] : normal S1 and S2 [Normal Rate] : heart rate was normal [Regular Rhythm] : with a regular rhythm [No Edema] : no peripheral edema [Pedal Pulses Normal] : the pedal pulses are present [Not Tender] : non-tender [Normal Bowel Sounds] : normal bowel sounds [Soft] : abdomen soft [Not Distended] : not distended [Normal Anterior Cervical Nodes] : no anterior cervical lymphadenopathy [No Spinal Tenderness] : no spinal tenderness [Spine Straight] : spine straight [No Stigmata of Cushings Syndrome] : no stigmata of Cushings Syndrome [Normal Strength/Tone] : muscle strength and tone were normal [Normal Gait] : normal gait [No Rash] : no rash [Normal Reflexes] : deep tendon reflexes were 2+ and symmetric [No Tremors] : no tremors [Oriented x3] : oriented to person, place, and time [de-identified] : Missing right lower incisor [Acanthosis Nigricans] : no acanthosis nigricans [de-identified] : minimal goiter [de-identified] : feet wrapped in ace bandages from podiatry

## 2024-03-05 NOTE — REASON FOR VISIT
[Osteoporosis] : osteoporosis [Follow - Up] : a follow-up visit [Thyroid nodule/ MNG] : thyroid nodule/ MNG

## 2024-03-05 NOTE — HISTORY OF PRESENT ILLNESS
[Zoledronic Acid (Zometa)] : Zoledronic Acid [Prolia (Denosumab)] : Prolia (Denosumab) [FreeTextEntry1] :  Pt returns for a follow-up visit for osteoporosis. No interval health changes. No major surgeries, hospitalizations, fractures or changes in medication. Up to date with dentist. No major dental work planned.  hx Pt had 2 doses Reclast last 2012. No interval fx.  Changed to Prolia 2017. Tolerating well. No thigh pain, no interval fx. Last DDS within past 3 months. No ONJ.Since the last visit pt has no significant health change. No interval surgeries, fractures, health change, or change in medications.Pt reports having partial implants.    Active GERD, has recent EGD, not ideal for oral treatment.   Small goiter. Clinically euthyroid. Takes chewable calcium H/o of low Vit D currently on Vitamin D 2000 UI daily.  ill bladder Ca, pt  2020.

## 2024-03-05 NOTE — ASSESSMENT
[Denosumab Therapy] : Risks  and benefits of denosumab therapy were discussed with the patient including eczema, cellulitis, osteonecrosis of the jaw and atypical femur fractures [Bisphosphonate Therapy] : Risks and benefits of bisphosphonate therapy were  discussed with the patient including gastroesophageal irritation, osteonecrosis of the jaw, and atypical femur fractures, and acute phase reaction [Bisphosphonates] : The patient was instructed to take bisphosphonates on an empty stomach with a full glass of water,and wait at least 30 minutes before eating or lying down [FreeTextEntry1] : 79-year-old female with:  1. Postmenopausal osteoporosis: h/o low bone density. Last dose of IV Reclast 2012. BMD significantly decreased in femoral neck and total hip vs prior study. Options of therapy reviewed. Pt initially elected to take Reclast as she tolerated this well in the past. Referred to rheumatology but decided not to take Reclast on advice of DDS. Began Prolia January 2017. Tolerating well. No thigh pain, no interval fx. No ON . BMD July 21,2022 generally stable except for decrease in spine. However it appears that the previous value 2020 was probably an outlier in the bone density overall stable in the spine.   Bone density 2023 shows declining values in hip.  Increase in bone density in spine is difficult to explain. Patient advised of risk of fracture if she does not begin medical therapy for osteoporosis. Given her need for upcoming dental work she would be a good candidate for anabolic therapy first but she declines. Pt was on short-term Actonel 35 mg a week weekly with the expectation that she will stop the medication in advance of upcoming dental work. Pt resumed Prolia August 2023.  2. Small goiter on exam: pt clinically and chemically euthyroid. US shows no nodules. No local neck pain. No dysphagia or dysphonia. No raciness, shakiness, heat/cold intolerance, tiredness, or fatigue. No palpitations, tremors, or sudden weight gain/loss. TFTs Feb 2020 normal. TUS 3/2021 indicates heterogenous, enlarged, no nodules. Observe.  3. H/o low Vit D, on Vitamin D 2000 UI. plus 50k once weekly.  Call Dr. Clay Draper for labs   F/u in 6 months and repeat BMD

## 2024-04-10 ENCOUNTER — EMERGENCY (EMERGENCY)
Facility: HOSPITAL | Age: 79
LOS: 1 days | Discharge: HOME | End: 2024-04-10
Attending: EMERGENCY MEDICINE
Payer: MEDICARE

## 2024-04-10 VITALS
RESPIRATION RATE: 20 BRPM | TEMPERATURE: 98 F | HEART RATE: 83 BPM | SYSTOLIC BLOOD PRESSURE: 184 MMHG | DIASTOLIC BLOOD PRESSURE: 71 MMHG | HEIGHT: 62 IN | WEIGHT: 130.07 LBS | OXYGEN SATURATION: 97 %

## 2024-04-10 DIAGNOSIS — Z90.49 ACQUIRED ABSENCE OF OTHER SPECIFIED PARTS OF DIGESTIVE TRACT: Chronic | ICD-10-CM

## 2024-04-10 LAB
ALBUMIN SERPL ELPH-MCNC: 4.4 G/DL — SIGNIFICANT CHANGE UP (ref 3.3–5)
ALP SERPL-CCNC: 59 U/L — SIGNIFICANT CHANGE UP (ref 40–120)
ALT FLD-CCNC: 28 U/L — SIGNIFICANT CHANGE UP (ref 10–45)
AMMONIA BLD-MCNC: 29 UMOL/L — SIGNIFICANT CHANGE UP (ref 11–55)
ANION GAP SERPL CALC-SCNC: 13 MMOL/L — SIGNIFICANT CHANGE UP (ref 5–17)
APTT BLD: 33.4 SEC — SIGNIFICANT CHANGE UP (ref 24.5–35.6)
AST SERPL-CCNC: 38 U/L — SIGNIFICANT CHANGE UP (ref 10–40)
BASOPHILS # BLD AUTO: 0.04 K/UL — SIGNIFICANT CHANGE UP (ref 0–0.2)
BASOPHILS NFR BLD AUTO: 0.9 % — SIGNIFICANT CHANGE UP (ref 0–2)
BILIRUB SERPL-MCNC: 1 MG/DL — SIGNIFICANT CHANGE UP (ref 0.2–1.2)
BUN SERPL-MCNC: 14 MG/DL — SIGNIFICANT CHANGE UP (ref 7–23)
CALCIUM SERPL-MCNC: 9 MG/DL — SIGNIFICANT CHANGE UP (ref 8.4–10.5)
CHLORIDE SERPL-SCNC: 104 MMOL/L — SIGNIFICANT CHANGE UP (ref 96–108)
CK SERPL-CCNC: 64 U/L — SIGNIFICANT CHANGE UP (ref 25–170)
CO2 SERPL-SCNC: 18 MMOL/L — LOW (ref 22–31)
CREAT SERPL-MCNC: 0.67 MG/DL — SIGNIFICANT CHANGE UP (ref 0.5–1.3)
CRP SERPL-MCNC: <3 MG/L — SIGNIFICANT CHANGE UP (ref 0–4)
EGFR: 88 ML/MIN/1.73M2 — SIGNIFICANT CHANGE UP
EOSINOPHIL # BLD AUTO: 0.06 K/UL — SIGNIFICANT CHANGE UP (ref 0–0.5)
EOSINOPHIL NFR BLD AUTO: 1.4 % — SIGNIFICANT CHANGE UP (ref 0–6)
GLUCOSE SERPL-MCNC: 116 MG/DL — HIGH (ref 70–99)
HCT VFR BLD CALC: 39.1 % — SIGNIFICANT CHANGE UP (ref 34.5–45)
HGB BLD-MCNC: 12.4 G/DL — SIGNIFICANT CHANGE UP (ref 11.5–15.5)
IMM GRANULOCYTES NFR BLD AUTO: 0.5 % — SIGNIFICANT CHANGE UP (ref 0–0.9)
INR BLD: 1.06 RATIO — SIGNIFICANT CHANGE UP (ref 0.85–1.18)
LACTATE SERPL-SCNC: 1.8 MMOL/L — SIGNIFICANT CHANGE UP (ref 0.5–2)
LYMPHOCYTES # BLD AUTO: 1.21 K/UL — SIGNIFICANT CHANGE UP (ref 1–3.3)
LYMPHOCYTES # BLD AUTO: 28 % — SIGNIFICANT CHANGE UP (ref 13–44)
MCHC RBC-ENTMCNC: 29.2 PG — SIGNIFICANT CHANGE UP (ref 27–34)
MCHC RBC-ENTMCNC: 31.7 GM/DL — LOW (ref 32–36)
MCV RBC AUTO: 92.2 FL — SIGNIFICANT CHANGE UP (ref 80–100)
MONOCYTES # BLD AUTO: 0.32 K/UL — SIGNIFICANT CHANGE UP (ref 0–0.9)
MONOCYTES NFR BLD AUTO: 7.4 % — SIGNIFICANT CHANGE UP (ref 2–14)
NEUTROPHILS # BLD AUTO: 2.67 K/UL — SIGNIFICANT CHANGE UP (ref 1.8–7.4)
NEUTROPHILS NFR BLD AUTO: 61.8 % — SIGNIFICANT CHANGE UP (ref 43–77)
NRBC # BLD: 0 /100 WBCS — SIGNIFICANT CHANGE UP (ref 0–0)
PLATELET # BLD AUTO: 250 K/UL — SIGNIFICANT CHANGE UP (ref 150–400)
POTASSIUM SERPL-MCNC: 3.8 MMOL/L — SIGNIFICANT CHANGE UP (ref 3.5–5.3)
POTASSIUM SERPL-MCNC: 5.6 MMOL/L — HIGH (ref 3.5–5.3)
POTASSIUM SERPL-SCNC: 3.8 MMOL/L — SIGNIFICANT CHANGE UP (ref 3.5–5.3)
POTASSIUM SERPL-SCNC: 5.6 MMOL/L — HIGH (ref 3.5–5.3)
PROT SERPL-MCNC: 7.3 G/DL — SIGNIFICANT CHANGE UP (ref 6–8.3)
PROTHROM AB SERPL-ACNC: 11.1 SEC — SIGNIFICANT CHANGE UP (ref 9.5–13)
RBC # BLD: 4.24 M/UL — SIGNIFICANT CHANGE UP (ref 3.8–5.2)
RBC # FLD: 13.8 % — SIGNIFICANT CHANGE UP (ref 10.3–14.5)
SODIUM SERPL-SCNC: 135 MMOL/L — SIGNIFICANT CHANGE UP (ref 135–145)
TROPONIN T, HIGH SENSITIVITY RESULT: 9 NG/L — SIGNIFICANT CHANGE UP (ref 0–51)
WBC # BLD: 4.32 K/UL — SIGNIFICANT CHANGE UP (ref 3.8–10.5)
WBC # FLD AUTO: 4.32 K/UL — SIGNIFICANT CHANGE UP (ref 3.8–10.5)

## 2024-04-10 PROCEDURE — 70496 CT ANGIOGRAPHY HEAD: CPT | Mod: 26,MC

## 2024-04-10 PROCEDURE — 70450 CT HEAD/BRAIN W/O DYE: CPT | Mod: 26,59,MC

## 2024-04-10 PROCEDURE — 70498 CT ANGIOGRAPHY NECK: CPT | Mod: 26,MC

## 2024-04-10 PROCEDURE — 99223 1ST HOSP IP/OBS HIGH 75: CPT | Mod: FS

## 2024-04-10 PROCEDURE — 93010 ELECTROCARDIOGRAM REPORT: CPT

## 2024-04-10 RX ORDER — FAMOTIDINE 10 MG/ML
20 INJECTION INTRAVENOUS
Refills: 0 | Status: DISCONTINUED | OUTPATIENT
Start: 2024-04-10 | End: 2024-04-13

## 2024-04-10 RX ORDER — METOCLOPRAMIDE HCL 10 MG
10 TABLET ORAL
Refills: 0 | Status: DISCONTINUED | OUTPATIENT
Start: 2024-04-10 | End: 2024-04-13

## 2024-04-10 RX ORDER — SIMVASTATIN 20 MG/1
10 TABLET, FILM COATED ORAL AT BEDTIME
Refills: 0 | Status: DISCONTINUED | OUTPATIENT
Start: 2024-04-10 | End: 2024-04-13

## 2024-04-10 RX ADMIN — Medication 10 MILLIGRAM(S): at 20:34

## 2024-04-10 RX ADMIN — SIMVASTATIN 10 MILLIGRAM(S): 20 TABLET, FILM COATED ORAL at 21:31

## 2024-04-10 RX ADMIN — FAMOTIDINE 20 MILLIGRAM(S): 10 INJECTION INTRAVENOUS at 21:31

## 2024-04-10 NOTE — ED CDU PROVIDER INITIAL DAY NOTE - ATTENDING APP SHARED VISIT CONTRIBUTION OF CARE
attending Sushil: pt with pain to LUE and LLE. Evaluated by neurology in ED. Plan for CDU for telemetry monitoring, neuro checks, MRI, frequent reeval, vitals q 4hrs, neuro following

## 2024-04-10 NOTE — CONSULT NOTE ADULT - ATTENDING COMMENTS
Ms. Do is a 79 year old right handed woman with past medical history of Pseudobowel obstruction who came to [facility] ER due to the transient episode of left upper and lower extremities pain for 2-3 hours. The pain was burning in nature. Then completely resolved.    During my evaluation, she reported that intermittently get pain mainly at left thigh only. Pain predominately during movement of left leg and completely resolved during the rest.  No history of trauma or back pain. No bowel and bladder involvement. She able to stand up and able to walk without any difficulty.   Non focal exam.   Etiology of above symptoms is uncertain. Patient will benefit from MRI L spine to rule out treatable etiologies.     MRI brain and C spine did not showed any acute pathology.     Continue medical management, neuro- check and fall precaution.  GI and DVT prophylaxis.  I discussed the diagnosis and treatment plan with the patient.  All questions and concerns were addressed. The patient demonstrated good understanding of the treatment plan.  My cumulative time taking care of this patient is 80 minutes  If you have any further questions, please do not hesitate to contact our consult service.  Thank you for allowing us to participate in this patient care.

## 2024-04-10 NOTE — ED ADULT NURSE NOTE - OBJECTIVE STATEMENT
Pt came in c/o burning sensation from left neck to left arm and down the left leg. Pt went to urgent care and was sent in the ER for abnormal EKG. Pt is alert and orientedX4. Breathing easy and non labored. Pt is ambulatory. Pt is not diaphoretic. Pt's family member at bedside.

## 2024-04-10 NOTE — ED ADULT NURSE REASSESSMENT NOTE - NSFALLUNIVINTERV_ED_ALL_ED
Bed/Stretcher in lowest position, wheels locked, appropriate side rails in place/Call bell, personal items and telephone in reach/Instruct patient to call for assistance before getting out of bed/chair/stretcher/Non-slip footwear applied when patient is off stretcher/[place] to call system/Physically safe environment - no spills, clutter or unnecessary equipment/Purposeful proactive rounding/Room/bathroom lighting operational, light cord in reach

## 2024-04-10 NOTE — CONSULT NOTE ADULT - ASSESSMENT
79y (1945) with past medical history of pseudobowel obstruction presenting for left sided upper extremity pain that progressed to involve the lower limb. Patient was in her complete state of health yesterday before she went to sleep at 11 pm. She woke up this morning at 6:30 pm with her symptoms. Her exam is normal. Imaging so far is negative    Impression: left upper and lower pain less likely ischemic in etiology due to absent numbness or paresthesias, could be related to muscle cramps of metabolic etiology, if everything is negative, consider imaging neuraxis to check for any central etiology    plan:  [] obtain full metabolic panel  [] obtain CPK, UA, TSH, T3/T4, vitamin B1, B6, B12, folate, homocysteine, methylmalonic acid, lactate, creatnine kinase, ammonia, Cu, ceruloplasmin, SPEP, ESR, CRP, Zn  [] consider MRI brain with and without and MRI C T L spine with and without if all labs are negative    case to be seen with Dr. Ryder

## 2024-04-10 NOTE — CONSULT NOTE ADULT - SUBJECTIVE AND OBJECTIVE BOX
Neurology - Consult Note    -  Spectra: [phone] ([facility]), [phone] ([facility])  -    HPI: Patient LINDA GONZALEZ is a 79y (1945) with past medical history of pseudobowel obstruction presenting for left sided upper extremity pain that progressed to involve the lower limb. Patient was in her complete state of health yesterday before she went to sleep at 11 pm. She woke up this morning at 6:30 pm with her symptoms. She was experiencing the pain at the level of the arm medially as soon as she woke up. She then experienced pain in the lower limb at the level of the thigh that spread to her leg. she denied any trauma or recent infection but has experienced back pain at the lower middle area which she expressed happens sporadically. She denies any dizziness or paresthesias. She denies numbness. She denies any weakness. She only endorses mild headache described as pain that has affected her left forehead. Headache is 2/10 in intensity.  She otherwise had the episode for 3 hours and presented to the ED for evaluation at the time of which symptoms resolved.      Review of Systems:  INCOMPLETE   CONSTITUTIONAL: No fevers or chills  EYES AND ENT: No visual changes or no throat pain   NECK: No pain or stiffness  RESPIRATORY: No hemoptysis or shortness of breath  CARDIOVASCULAR: No chest pain or palpitations  GASTROINTESTINAL: No melena or hematochezia  GENITOURINARY: No dysuria or hematuria  NEUROLOGICAL: +As stated in HPI above  SKIN: No itching, burning, rashes, or lesions   All other review of systems is negative unless indicated above.    Allergies:  sulfa drugs (Hives)  penicillins (Unknown)      PMHx/PSHx/Family Hx: As above, otherwise see below   Chronic idiopathic pseudo-obstruction of intestine    Breast cancer    HLD (hyperlipidemia)    GERD (gastroesophageal reflux disease)    Osteoporosis        Social Hx:  No current use of tobacco, alcohol, or illicit drugs  Lives with ***    Medications:  MEDICATIONS  (STANDING):    MEDICATIONS  (PRN):      Vitals:  T(C): 36.4 (04-10-24 @ 10:11), Max: 36.4 (04-10-24 @ 10:11)  HR: 83 (04-10-24 @ 10:11) (83 - 83)  BP: 184/71 (04-10-24 @ 10:11) (184/71 - 184/71)  RR: 20 (04-10-24 @ 10:11) (20 - 20)  SpO2: 97% (04-10-24 @ 10:11) (97% - 97%)    Physical Examination: INCOMPLETE  General - NAD  Cardiovascular - Peripheral pulses palpable, no edema  Eyes - Fundoscopy with flat, sharp optic discs and no hemorrhage or exudates; Fundoscopy not well visualized; Fundoscopy not performed due to safety precautions in the setting of infection risk    Neurologic Exam:  Mental status - Awake, Alert, Oriented to person, place, and time. Speech fluent, repetition and naming intact. Follows simple and complex commands. Attention/concentration, recent and remote memory (including registration and recall), and fund of knowledge intact    Cranial nerves - PERRLA, VFF, EOMI, face sensation (V1-V3) intact b/l, facial strength intact without asymmetry b/l, hearing intact b/l, palate with symmetric elevation, trapezius OR sternocleidomastoid 5/5 strength b/l, tongue midline on protrusion with full lateral movement    Motor - Normal bulk and tone throughout. No pronator drift.  Strength testing            Deltoid      Biceps      Triceps     Wrist Extension    Wrist Flexion     Interossei         R            5                 5               5                     5                              5                        5                 5  L             5                 5               5                     5                              5                        5                 5              Hip Flexion    Hip Extension    Knee Flexion    Knee Extension    Dorsiflexion    Plantar Flexion  R              5                           5                       5                           5                            5                          5  L              5                           5                        5                           5                            5                          5    Sensation - Light touch/temperature OR pain/vibration intact throughout    DTR's -             Biceps      Triceps     Brachioradialis      Patellar    Ankle    Toes/plantar response  R             2+             2+                  2+                       2+            2+                 Down  L              2+             2+                 2+                        2+           2+                 Down    Coordination - Finger to Nose intact b/l. No tremors appreciated    Gait and station - Normal casual gait. Romberg (-)    Labs:          CAPILLARY BLOOD GLUCOSE              CSF:                  Radiology:     Neurology - Consult Note    -  Spectra: [phone] ([facility]), [phone] ([facility])  -    HPI: Patient LINDA GONZALEZ is a 79y (1945) with past medical history of pseudobowel obstruction presenting for left sided upper extremity pain that progressed to involve the lower limb. Patient was in her complete state of health yesterday before she went to sleep at 11 pm. She woke up this morning at 6:30 pm with her symptoms. She was experiencing the pain at the level of the arm medially as soon as she woke up. She then experienced pain in the lower limb at the level of the thigh that spread to her leg. she denied any trauma or recent infection but has experienced back pain at the lower middle area which she expressed happens sporadically. She denies any dizziness or paresthesias. She denies numbness. She denies any weakness. She only endorses mild headache described as pain that has affected her left forehead. Headache is 2/10 in intensity.  She otherwise had the episode for 3 hours and presented to the ED for evaluation at the time of which symptoms resolved.    Review of Systems:   NEUROLOGICAL: +As stated in HPI above  SKIN: No itching, burning, rashes, or lesions   All other review of systems is negative unless indicated above.    Allergies:  sulfa drugs (Hives)  penicillins (Unknown)    PMHx/PSHx/Family Hx: As above, otherwise see below   Chronic idiopathic pseudo-obstruction of intestine    Breast cancer    HLD (hyperlipidemia)    GERD (gastroesophageal reflux disease)    Osteoporosis    Social Hx:  No current use of tobacco, alcohol, or illicit drugs    Medications:  MEDICATIONS  (STANDING):    MEDICATIONS  (PRN):    Vitals:  T(C): 36.4 (04-10-24 @ 10:11), Max: 36.4 (04-10-24 @ 10:11)  HR: 83 (04-10-24 @ 10:11) (83 - 83)  BP: 184/71 (04-10-24 @ 10:11) (184/71 - 184/71)  RR: 20 (04-10-24 @ 10:11) (20 - 20)  SpO2: 97% (04-10-24 @ 10:11) (97% - 97%)    Physical Examination:   General - NAD    Neurologic Exam:  Mental status - Awake, Alert, Oriented to person, place, and time. Speech fluent, repetition and naming intact. Follows simple and complex commands.     Cranial nerves - PERRLA, VFF, EOMI, face sensation (V1-V3) intact b/l, facial strength intact without asymmetry b/l, hearing intact b/l, palate with symmetric elevation, sternocleidomastoid 5/5 strength b/l, tongue midline on protrusion with full lateral movement    Motor - Normal bulk and tone throughout. No pronator drift.  Strength testing            Deltoid      Biceps      Triceps                R            5                 5               5                     5                               L             5                 5               5                     5                                       Hip Flexion    Hip Extension    Knee Flexion    Knee Extension    Dorsiflexion    Plantar Flexion  R              5                           5                       5                           5                            5                          5  L              5                           5                        5                           5                            5                          5    Sensation - Light touch intact throughout    DTR's -             Biceps      Triceps     Brachioradialis      Patellar             Ankle    Toes/plantar response  R             2+             2+                  2+                       2+            0+                 Down  L              2+             2+                 2+                        2+           0+                 Down    Coordination - Finger to Nose intact b/l. No tremors appreciated. HTS intact bilaterally.     Gait and station - Normal casual gait. Romberg (-). Patient able to tandem.    Labs:          CAPILLARY BLOOD GLUCOSE    CSF:    Radiology:     Neurology - Consult Note    -  Spectra: [phone] ([facility]), [phone] ([facility])  -    HPI: Patient LINDA GONZALEZ is a 79y (1945) with past medical history of pseudobowel obstruction presenting for left sided upper extremity pain that progressed to involve the lower limb. Patient was in her complete state of health yesterday before she went to sleep at 11 pm. She woke up this morning at 6:30 pm with her symptoms. She was experiencing the pain at the level of the arm medially as soon as she woke up. She then experienced pain in the lower limb at the level of the thigh that spread to her leg. she denied any trauma or recent infection but has experienced back pain at the lower middle area which she expressed happens sporadically. She denies any dizziness or paresthesias. She denies numbness. She denies any weakness. She only endorses mild headache described as pain that has affected her left forehead. Headache is 2/10 in intensity.  She otherwise had the episode for 3 hours and presented to the ED for evaluation at the time of which symptoms resolved.    Review of Systems:   NEUROLOGICAL: +As stated in HPI above  SKIN: No itching, burning, rashes, or lesions   All other review of systems is negative unless indicated above.    Allergies:  sulfa drugs (Hives)  penicillins (Unknown)    PMHx/PSHx/Family Hx: As above, otherwise see below   Chronic idiopathic pseudo-obstruction of intestine    Breast cancer    HLD (hyperlipidemia)    GERD (gastroesophageal reflux disease)    Osteoporosis    Social Hx:  No current use of tobacco, alcohol, or illicit drugs    Medications:  MEDICATIONS  (STANDING):    MEDICATIONS  (PRN):    Vitals:  T(C): 36.4 (04-10-24 @ 10:11), Max: 36.4 (04-10-24 @ 10:11)  HR: 83 (04-10-24 @ 10:11) (83 - 83)  BP: 184/71 (04-10-24 @ 10:11) (184/71 - 184/71)  RR: 20 (04-10-24 @ 10:11) (20 - 20)  SpO2: 97% (04-10-24 @ 10:11) (97% - 97%)    Physical Examination:   General - NAD    Neurologic Exam:  Mental status - Awake, Alert, Oriented to person, place, and time. Speech fluent, repetition and naming intact. Follows simple and complex commands.     Cranial nerves - PERRLA, VFF, EOMI, face sensation (V1-V3) intact b/l, facial strength intact without asymmetry b/l, hearing intact b/l, palate with symmetric elevation, sternocleidomastoid 5/5 strength b/l, tongue midline on protrusion with full lateral movement    Motor - Normal bulk and tone throughout. No pronator drift.  Strength testing            Deltoid      Biceps      Triceps                R            5                 5               5                     5                               L             5                 5               5                     5                                       Hip Flexion    Hip Extension    Knee Flexion    Knee Extension    Dorsiflexion    Plantar Flexion  R              5                           5                       5                           5                            5                          5  L              5                           5                        5                           5                            5                          5    Sensation - Light touch and pinprick intact throughout    DTR's -             Biceps      Triceps     Brachioradialis      Patellar             Ankle    Toes/plantar response  R             2+             2+                  2+                       2+            0+                 Down  L              2+             2+                 2+                        2+           0+                 Down    Coordination - Finger to Nose intact b/l. No tremors appreciated. HTS intact bilaterally.     Gait and station - Normal casual gait. Romberg (-). Patient able to tandem.    Labs:          CAPILLARY BLOOD GLUCOSE    CSF:    Radiology:    < from: CT Angio Neck w/ IV Cont (04.10.24 @ 11:26) >  IMPRESSION:    CT brain:  No hydrocephalus, acute intracranial hemorrhage, mass effect, or brain   edema.    CTA brain:  No flow-limiting stenosis or vascular aneurysm. No AVM.    Venous system is well opacified, no evidence for venous sinus or cortical   vein thrombosis.    CTA neck:  No flow-limiting stenosis, evidence for arterial dissection, or vascular   aneurysm.    < end of copied text >   Neurology - Consult Note    -  Spectra: [phone] ([facility]), [phone] ([facility])  -    HPI: Patient LINDA GONZALEZ is a 79y (1945) with past medical history of pseudobowel obstruction presenting for left sided upper extremity pain that progressed to involve the lower limb. Patient was in her complete state of health yesterday before she went to sleep at 11 pm. She woke up this morning at 6:30 Am with her symptoms. She was experiencing the pain at the level of the arm medially as soon as she woke up. She then experienced pain in the lower limb at the level of the thigh that spread to her leg. she denied any trauma or recent infection but has experienced back pain at the lower middle area which she expressed happens sporadically. She denies any dizziness or paresthesias. She denies numbness. She denies any weakness. She only endorses mild headache described as pain that has affected her left forehead. Headache is 2/10 in intensity.  She otherwise had the episode for 3 hours and presented to the ED for evaluation at the time of which symptoms resolved.    Review of Systems:   NEUROLOGICAL: +As stated in HPI above  SKIN: No itching, burning, rashes, or lesions   All other review of systems is negative unless indicated above.    Allergies:  sulfa drugs (Hives)  penicillins (Unknown)    PMHx/PSHx/Family Hx: As above, otherwise see below   Chronic idiopathic pseudo-obstruction of intestine    Breast cancer    HLD (hyperlipidemia)    GERD (gastroesophageal reflux disease)    Osteoporosis    Social Hx:  No current use of tobacco, alcohol, or illicit drugs    Medications:  MEDICATIONS  (STANDING):    MEDICATIONS  (PRN):    Vitals:  T(C): 36.4 (04-10-24 @ 10:11), Max: 36.4 (04-10-24 @ 10:11)  HR: 83 (04-10-24 @ 10:11) (83 - 83)  BP: 184/71 (04-10-24 @ 10:11) (184/71 - 184/71)  RR: 20 (04-10-24 @ 10:11) (20 - 20)  SpO2: 97% (04-10-24 @ 10:11) (97% - 97%)    Physical Examination:   General - NAD    Neurologic Exam:  Mental status - Awake, Alert, Oriented to person, place, and time. Speech fluent, repetition and naming intact. Follows simple and complex commands.     Cranial nerves - PERRLA, VFF, EOMI, face sensation (V1-V3) intact b/l, facial strength intact without asymmetry b/l, hearing intact b/l, palate with symmetric elevation, sternocleidomastoid 5/5 strength b/l, tongue midline on protrusion with full lateral movement    Motor - Normal bulk and tone throughout. No pronator drift.  Strength testing            Deltoid      Biceps      Triceps                R            5                 5               5                     5                               L             5                 5               5                     5                                       Hip Flexion    Hip Extension    Knee Flexion    Knee Extension    Dorsiflexion    Plantar Flexion  R              5                           5                       5                           5                            5                          5  L              5                           5                        5                           5                            5                          5    Sensation - Light touch and pinprick intact throughout    DTR's -             Biceps      Triceps     Brachioradialis      Patellar             Ankle    Toes/plantar response  R             2+             2+                  2+                       2+            0+                 Down  L              2+             2+                 2+                        2+           0+                 Down    Coordination - Finger to Nose intact b/l. No tremors appreciated. HTS intact bilaterally.     Gait and station - Normal casual gait. Romberg (-). Patient able to tandem.    Labs:          CAPILLARY BLOOD GLUCOSE    CSF:    Radiology:    < from: CT Angio Neck w/ IV Cont (04.10.24 @ 11:26) >  IMPRESSION:    CT brain:  No hydrocephalus, acute intracranial hemorrhage, mass effect, or brain   edema.    CTA brain:  No flow-limiting stenosis or vascular aneurysm. No AVM.    Venous system is well opacified, no evidence for venous sinus or cortical   vein thrombosis.    CTA neck:  No flow-limiting stenosis, evidence for arterial dissection, or vascular   aneurysm.    < end of copied text >

## 2024-04-10 NOTE — ED PROVIDER NOTE - PHYSICAL EXAMINATION
CONSTITUTIONAL: Patient is awake, alert and oriented x 3. Patient is well appearing and in no acute distress  HEAD: NCAT  EYES: PERRL b/l, EOMI  NECK: supple, FROM  LUNGS: CTA b/l, no wheezing or rales   HEART: RRR.+S1S2 no murmurs  ABDOMEN: Soft, mild distention, nttp,  no rebound or guarding  EXTREMITY: No edema or calf tenderness b/l, No gross deformity or ttp of the UE/LE b/l, FROM upper and lower ext b/l  SKIN: No rash or lesions  NEURO: Cn3-12 grossly intact. Strength 5/5 UE/LE b/l. Slight decreased sensation RUE compared to LUE otherwise normal gross sensation UE/LE b/l

## 2024-04-10 NOTE — ED PROVIDER NOTE - PROGRESS NOTE DETAILS
Patient imaging without acute concerning findings. Labs reviewed as well. Neuro recommending MRI and additional lab work if initial work up non-actionable. Pt agreeable to CDU, and CDU PA aware as well   Susan Johansen PA-C

## 2024-04-10 NOTE — ED CDU PROVIDER INITIAL DAY NOTE - NS ED MD PROGRESS NOTE ADD
Problem: At Risk for Falls  Goal: # Patient does not fall  Outcome: Outcome Met, Continue evaluating goal progress toward completion  Goal: # Takes action to control fall-related risks  Outcome: Outcome Met, Continue evaluating goal progress toward completion  Goal: # Verbalizes understanding of fall risk/precautions  Description: Document education using the patient education activity  Outcome: Outcome Met, Continue evaluating goal progress toward completion     Problem: Pressure Injury, Risk for  Goal: # Skin remains intact  Outcome: Outcome Met, Continue evaluating goal progress toward completion  Goal: No new pressure injury (PI) development  Outcome: Outcome Met, Continue evaluating goal progress toward completion  Goal: # Verbalizes understanding of PI risk factors and prevention strategies  Description: Document education using the patient education activity. Outcome: Outcome Met, Continue evaluating goal progress toward completion  Note:   Patient able to shift weight and reposition independently. Uses pillows to relieve pressure. Agreeable to staff reminders to reposition. Add Progress Note...

## 2024-04-10 NOTE — ED ADULT TRIAGE NOTE - CHIEF COMPLAINT QUOTE
Pain in left side of neck, left arm, and down the left leg; sent from urgent care due to abnormal ECG. Denies chest pain.

## 2024-04-10 NOTE — ED CDU PROVIDER INITIAL DAY NOTE - PROGRESS NOTE DETAILS
CDU NOTE SALO Pereyra: spoke with Neuro to clarify MRI recs. MRI of spine would require longer length of stay and recommendation would be admission instead of cdu. as per Neurology Dr. Beka Hoover, pt does not need thoracic and lumbar MRIs in cdu, will need MRI brain and c-spine. both ordered.

## 2024-04-10 NOTE — ED PROVIDER NOTE - OBJECTIVE STATEMENT
79-year-old female with history of pseudointestinal obstruction presents to the emergency department complaining of pain in the left arm.  Patient reports that she woke up around 6:30 AM and shortly after that realized that she had a pain in her left arm in the bicep to the wrist which she describes as a sharp and burning sensation.  She reports that after she noticed this discomfort in her arm she also felt a slight similar pain near her collarbone followed by discomfort in the left leg.  She reports since symptoms have improved and have reoccurred intermittently.  She also endorses mild headache as well.  Patient has never had similar in the past.  She presented to urgent care prior to arrival to the emergency department and was advised to proceed to the ED.  Denies vision changes, numbness, weakness, falls, chest pain, shortness of breath, abdominal pain nausea or vomiting.

## 2024-04-10 NOTE — ED ADULT NURSE REASSESSMENT NOTE - NS ED NURSE REASSESS COMMENT FT1
Code stroke called and then cancelled at 1054 am as per Dr. Ling and neuro team.
Pt received from [facility] Balderas at 19:00hrs. Pt A&O x 4. Pt in CDU for Telemetry, neuro check, and MRI. Pt c/o mild pain lt groin radiating to lt thigh, refused pain medication. Pt denies any chest pain, SOB, dizziness or palpitations at this time. V/S stable, IV 20G,Rt wrist patent and free of signs of infiltration. Pt ambulated to BR independently. Safety & comfort measures maintained. Educated patient to call for assistance as needed. Call bell in reach. Will continue to monitor.

## 2024-04-10 NOTE — ED PROVIDER NOTE - ATTENDING APP SHARED VISIT CONTRIBUTION OF CARE
initially called as code stroke, later canceled after evaluation by neurology    attending Sushil: 79yF h/o pseudointestinal obstruction p/w pain to LUE and LLE since waking this morning at 6:30AM, LKN last night. Described as burning sensation. Denies facial symptoms. Assoc with mild headache. Exam as above. Will obtain labs, Ct imaging, pain control, neuro eval in ED, reassess

## 2024-04-11 ENCOUNTER — NON-APPOINTMENT (OUTPATIENT)
Age: 79
End: 2024-04-11

## 2024-04-11 VITALS
TEMPERATURE: 98 F | DIASTOLIC BLOOD PRESSURE: 68 MMHG | HEART RATE: 84 BPM | RESPIRATION RATE: 18 BRPM | SYSTOLIC BLOOD PRESSURE: 124 MMHG | OXYGEN SATURATION: 94 %

## 2024-04-11 LAB
CERULOPLASMIN SERPL-MCNC: 23 MG/DL — SIGNIFICANT CHANGE UP (ref 16–45)
CHOLEST SERPL-MCNC: 111 MG/DL — SIGNIFICANT CHANGE UP
ERYTHROCYTE [SEDIMENTATION RATE] IN BLOOD: 11 MM/HR — SIGNIFICANT CHANGE UP (ref 0–20)
FOLATE SERPL-MCNC: 12.8 NG/ML — SIGNIFICANT CHANGE UP
HCYS SERPL-MCNC: 9.5 UMOL/L — SIGNIFICANT CHANGE UP
HDLC SERPL-MCNC: 41 MG/DL — LOW
LIPID PNL WITH DIRECT LDL SERPL: 51 MG/DL — SIGNIFICANT CHANGE UP
NON HDL CHOLESTEROL: 70 MG/DL — SIGNIFICANT CHANGE UP
PROT SERPL-MCNC: 6.4 G/DL — SIGNIFICANT CHANGE UP (ref 6–8.3)
T3 SERPL-MCNC: 74 NG/DL — LOW (ref 80–200)
T4 AB SER-ACNC: 5.2 UG/DL — SIGNIFICANT CHANGE UP (ref 4.6–12)
T4 FREE SERPL-MCNC: 1 NG/DL — SIGNIFICANT CHANGE UP (ref 0.9–1.8)
TRIGL SERPL-MCNC: 105 MG/DL — SIGNIFICANT CHANGE UP
TSH SERPL-MCNC: 5.2 UIU/ML — HIGH (ref 0.27–4.2)
VIT B12 SERPL-MCNC: 806 PG/ML — SIGNIFICANT CHANGE UP (ref 232–1245)

## 2024-04-11 PROCEDURE — 84630 ASSAY OF ZINC: CPT

## 2024-04-11 PROCEDURE — 84436 ASSAY OF TOTAL THYROXINE: CPT

## 2024-04-11 PROCEDURE — 82525 ASSAY OF COPPER: CPT

## 2024-04-11 PROCEDURE — G0378: CPT

## 2024-04-11 PROCEDURE — 84425 ASSAY OF VITAMIN B-1: CPT

## 2024-04-11 PROCEDURE — 70551 MRI BRAIN STEM W/O DYE: CPT | Mod: 26,MC

## 2024-04-11 PROCEDURE — 99284 EMERGENCY DEPT VISIT MOD MDM: CPT | Mod: GC

## 2024-04-11 PROCEDURE — 84165 PROTEIN E-PHORESIS SERUM: CPT

## 2024-04-11 PROCEDURE — 70551 MRI BRAIN STEM W/O DYE: CPT | Mod: MC

## 2024-04-11 PROCEDURE — 85730 THROMBOPLASTIN TIME PARTIAL: CPT

## 2024-04-11 PROCEDURE — 84207 ASSAY OF VITAMIN B-6: CPT

## 2024-04-11 PROCEDURE — 82390 ASSAY OF CERULOPLASMIN: CPT

## 2024-04-11 PROCEDURE — 72141 MRI NECK SPINE W/O DYE: CPT | Mod: 26,MC

## 2024-04-11 PROCEDURE — 84155 ASSAY OF PROTEIN SERUM: CPT

## 2024-04-11 PROCEDURE — 70496 CT ANGIOGRAPHY HEAD: CPT | Mod: MC

## 2024-04-11 PROCEDURE — 80061 LIPID PANEL: CPT

## 2024-04-11 PROCEDURE — 70498 CT ANGIOGRAPHY NECK: CPT | Mod: MC

## 2024-04-11 PROCEDURE — 82607 VITAMIN B-12: CPT

## 2024-04-11 PROCEDURE — 36415 COLL VENOUS BLD VENIPUNCTURE: CPT

## 2024-04-11 PROCEDURE — 83605 ASSAY OF LACTIC ACID: CPT

## 2024-04-11 PROCEDURE — 84484 ASSAY OF TROPONIN QUANT: CPT

## 2024-04-11 PROCEDURE — 93005 ELECTROCARDIOGRAM TRACING: CPT

## 2024-04-11 PROCEDURE — 86140 C-REACTIVE PROTEIN: CPT

## 2024-04-11 PROCEDURE — 99238 HOSP IP/OBS DSCHRG MGMT 30/<: CPT

## 2024-04-11 PROCEDURE — 83090 ASSAY OF HOMOCYSTEINE: CPT

## 2024-04-11 PROCEDURE — 82140 ASSAY OF AMMONIA: CPT

## 2024-04-11 PROCEDURE — 84480 ASSAY TRIIODOTHYRONINE (T3): CPT

## 2024-04-11 PROCEDURE — 80053 COMPREHEN METABOLIC PANEL: CPT

## 2024-04-11 PROCEDURE — 82746 ASSAY OF FOLIC ACID SERUM: CPT

## 2024-04-11 PROCEDURE — 84439 ASSAY OF FREE THYROXINE: CPT

## 2024-04-11 PROCEDURE — 85610 PROTHROMBIN TIME: CPT

## 2024-04-11 PROCEDURE — 99285 EMERGENCY DEPT VISIT HI MDM: CPT | Mod: 25

## 2024-04-11 PROCEDURE — 84443 ASSAY THYROID STIM HORMONE: CPT

## 2024-04-11 PROCEDURE — 85025 COMPLETE CBC W/AUTO DIFF WBC: CPT

## 2024-04-11 PROCEDURE — 83036 HEMOGLOBIN GLYCOSYLATED A1C: CPT

## 2024-04-11 PROCEDURE — 85652 RBC SED RATE AUTOMATED: CPT

## 2024-04-11 PROCEDURE — 70450 CT HEAD/BRAIN W/O DYE: CPT | Mod: MC

## 2024-04-11 PROCEDURE — 84132 ASSAY OF SERUM POTASSIUM: CPT

## 2024-04-11 PROCEDURE — 82550 ASSAY OF CK (CPK): CPT

## 2024-04-11 PROCEDURE — 72141 MRI NECK SPINE W/O DYE: CPT | Mod: MC

## 2024-04-11 PROCEDURE — 83921 ORGANIC ACID SINGLE QUANT: CPT

## 2024-04-11 RX ORDER — ACETAMINOPHEN 500 MG
650 TABLET ORAL ONCE
Refills: 0 | Status: COMPLETED | OUTPATIENT
Start: 2024-04-11 | End: 2024-04-11

## 2024-04-11 RX ADMIN — Medication 650 MILLIGRAM(S): at 02:32

## 2024-04-11 RX ADMIN — Medication 10 MILLIGRAM(S): at 08:53

## 2024-04-11 RX ADMIN — FAMOTIDINE 20 MILLIGRAM(S): 10 INJECTION INTRAVENOUS at 08:54

## 2024-04-11 NOTE — ED CDU PROVIDER DISPOSITION NOTE - NSFOLLOWUPINSTRUCTIONS_ED_ALL_ED_FT
Hydrate.     We recommend you follow up with your primary care provider within the next 2-3 days, please bring all of your results with you. You can also follow up with **NEURO    Please return to the Emergency Department with new, worsening, or concerning symptoms, such as:  -Shortness of breath or trouble breathing  -Pressure, pain, tightness in chest  -Facial drooping, arm weakness, or speech difficulty   -Head injury or loss of consciousness   -Nonstop bleeding or an open wound     *More detailed information regarding your visit and discharge can be found by reviewing this packet Please follow-up with your primary doctor within the next 1 week for reevaluation.    Please note your thyroid hormone (TSH) was elevated here which could indicate an underactive thyroid.  Please have this testing repeated with your primary doctor as outpatient.    Recommend outpatient neurology follow-up with Dr. Brown for continued management/evaluation and further workup regarding your symptoms.  Please call to schedule appointment.    Adrian Brown  Pain Medicine  [address] [place] [address], [address]  [place] [address]  Phone: [phone]  Follow Up Time: 4-6 Days    It is recommended you have an outpatient MRI of your lumbar spine with/without IV contrast for further evaluation.  You may discuss this with your primary doctor to schedule or Dr. Brown at your visit.    Take Tylenol 650 mg every 6 hours as needed for pain.    Return the Emergency Department immediately if you develop any new/worsening symptoms including but not limited to weakness, inability to walk, speech/visual changes, severe headache, dizziness, chest pain or any other concerns.    *More detailed information regarding your visit and discharge can be found by reviewing this packet

## 2024-04-11 NOTE — ED CDU PROVIDER SUBSEQUENT DAY NOTE - HISTORY
No interval changes since initial CDU provider note. NAD VSS. no events on tele. Pending MRI, neurology linh- SALO Jane

## 2024-04-11 NOTE — ED CDU PROVIDER DISPOSITION NOTE - CLINICAL COURSE
79-year-old female with history of pseudointestinal obstruction presents to the emergency department complaining of pain in the left arm.  Patient reports that she woke up around 6:30 AM and shortly after that realized that she had a pain in her left arm in the bicep to the wrist which she describes as a sharp and burning sensation.  She reports that after she noticed this discomfort in her arm she also felt a slight similar pain near her collarbone followed by discomfort in the left leg.  She reports since symptoms have improved and have reoccurred intermittently.  She also endorses mild headache as well.  Patient has never had similar in the past.  She presented to urgent care prior to arrival to the emergency department and was advised to proceed to the ED.  Denies vision changes, numbness, weakness, falls, chest pain, shortness of breath, abdominal pain nausea or vomiting.  ED course: CT/CTA negative for acute pathology. Neurology recommended MRIs. Plan for imaging and neuro checks in CDU. 79-year-old female with history of pseudointestinal obstruction presents to the emergency department complaining of pain in the left arm.  Patient reports that she woke up around 6:30 AM and shortly after that realized that she had a pain in her left arm in the bicep to the wrist which she describes as a sharp and burning sensation.  She reports that after she noticed this discomfort in her arm she also felt a slight similar pain near her collarbone followed by discomfort in the left leg.  She reports since symptoms have improved and have reoccurred intermittently.  She also endorses mild headache as well.  Patient has never had similar in the past.  She presented to urgent care prior to arrival to the emergency department and was advised to proceed to the ED.  Denies vision changes, numbness, weakness, falls, chest pain, shortness of breath, abdominal pain nausea or vomiting.  ED course: CT/CTA negative for acute pathology. Neurology recommended MRIs. Plan for imaging and neuro checks in CDU. Patient did well in CDU overnight. Symptoms persisted but did not worsen. No new focal neurologic deficits. MRI is resulted, noted degenerative changes of cervical spine, no acute hemorrhage, infarct, mass or mass effect noted.  Patient was reevaluated by neurology team in morning with attending, cleared for discharge home from neurology standpoint, they recommended follow-up with Dr. Brown as outpatient for pain management, Tylenol as needed for pain at home, outpatient L-spine with/without IV contrast. Case discussed with ED attending Dr. Nathan who evaluated patient, stable for discharge home with the above plan and outpt f/u. Stable at discharge.

## 2024-04-11 NOTE — ED CDU PROVIDER DISPOSITION NOTE - PATIENT PORTAL LINK FT
You can access the FollowMyHealth Patient Portal offered by [facility] by registering at the following website: [email or web address]. By joining [facility]’s FollowMyHealth portal, you will also be able to view your health information using other applications (apps) compatible with our system.

## 2024-04-11 NOTE — ED CDU PROVIDER SUBSEQUENT DAY NOTE - ATTENDING APP SHARED VISIT CONTRIBUTION OF CARE
Attending Iris Nathan MD- I have personally performed a face to face diagnostic evaluation on this patient.  I have reviewed the ACP note and agree with the history, exam, and plan of care, except as noted. My exam and MDM are as follows:    I received signout on this patient at 0800–in short this is an 79 F with history of pseudo intestinal obstruction, breast CA presented to ED for left-sided burning pain; ED workup including CT CTA unremarkable and patient placed in CDU for MRI head and C-spine.  No events overnight, on my evaluation patient reports complete resolution of burning pain, only noting mild aching pain at left upper thigh.  Has been ambulatory to bathroom with normal work of breathing and speaking full sentences.  Tolerating p.o.  MRI without acute pathology, only noting degenerative changes throughout C-spine.     Neurology evaluated patient, recommending outpatient follow up for pain management and MRI lumbar spine as well as Tylenol prn for pain. Patient understands recommendations; stable for discharge.

## 2024-04-11 NOTE — ED CDU PROVIDER SUBSEQUENT DAY NOTE - PROGRESS NOTE DETAILS
MRI is resulted, noted degenerative changes of cervical spine, no acute hemorrhage, infarct, mass or mass effect noted.  Patient was reevaluated by neurology team in morning with attending, cleared for discharge home from neurology standpoint, they recommended follow-up with Dr. Brown as outpatient for pain management, Tylenol as needed for pain at home, outpatient L-spine with/without IV contrast. Case discussed with ED attending Dr. Nathan who evaluated patient, stable for discharge home with the above plan.  Patient ambulatory at bedside and feels comfortable with plan for outpatient follow-up.  Discussed all results with her at bedside, stable for discharge. - Michael Hernandez PA-C Patient seen at bedside in NAD.  VSS.  Patient resting comfortably, still having same L side burning pain. No HA, speech/visual changes, weakness, dizziness. Awaiting MRI. - Michael Hernandez PA-C

## 2024-04-12 LAB
% ALBUMIN: 61.7 % — SIGNIFICANT CHANGE UP
% ALPHA 1: 4.3 % — SIGNIFICANT CHANGE UP
% ALPHA 2: 10.5 % — SIGNIFICANT CHANGE UP
% BETA: 10 % — SIGNIFICANT CHANGE UP
% GAMMA: 13.5 % — SIGNIFICANT CHANGE UP
ALBUMIN SERPL ELPH-MCNC: 3.9 G/DL — SIGNIFICANT CHANGE UP (ref 3.6–5.5)
ALBUMIN/GLOB SERPL ELPH: 1.6 RATIO — SIGNIFICANT CHANGE UP
ALPHA1 GLOB SERPL ELPH-MCNC: 0.3 G/DL — SIGNIFICANT CHANGE UP (ref 0.1–0.4)
ALPHA2 GLOB SERPL ELPH-MCNC: 0.7 G/DL — SIGNIFICANT CHANGE UP (ref 0.5–1)
B-GLOBULIN SERPL ELPH-MCNC: 0.6 G/DL — SIGNIFICANT CHANGE UP (ref 0.5–1)
GAMMA GLOBULIN: 0.9 G/DL — SIGNIFICANT CHANGE UP (ref 0.6–1.6)
PROT PATTERN SERPL ELPH-IMP: SIGNIFICANT CHANGE UP
PROT SERPL-MCNC: 6.4 G/DL — SIGNIFICANT CHANGE UP (ref 6–8.3)

## 2024-04-14 LAB
PYRIDOXAL PHOS SERPL-MCNC: 3.1 UG/L — LOW (ref 3.4–65.2)
ZINC SERPL-MCNC: 66 UG/DL — SIGNIFICANT CHANGE UP (ref 44–115)

## 2024-04-15 LAB
COPPER SERPL-MCNC: 98 UG/DL — SIGNIFICANT CHANGE UP (ref 80–158)
VIT B1 SERPL-MCNC: 92.3 NMOL/L — SIGNIFICANT CHANGE UP (ref 66.5–200)

## 2024-04-18 LAB — METHYLMALONATE SERPL-SCNC: 156 NMOL/L — SIGNIFICANT CHANGE UP (ref 0–378)

## 2024-07-08 ENCOUNTER — RX RENEWAL (OUTPATIENT)
Age: 79
End: 2024-07-08

## 2024-07-19 ENCOUNTER — OUTPATIENT (OUTPATIENT)
Dept: OUTPATIENT SERVICES | Facility: HOSPITAL | Age: 79
LOS: 1 days | End: 2024-07-19
Payer: MEDICARE

## 2024-07-19 ENCOUNTER — APPOINTMENT (OUTPATIENT)
Dept: MRI IMAGING | Facility: CLINIC | Age: 79
End: 2024-07-19

## 2024-07-19 DIAGNOSIS — Z90.49 ACQUIRED ABSENCE OF OTHER SPECIFIED PARTS OF DIGESTIVE TRACT: Chronic | ICD-10-CM

## 2024-07-19 DIAGNOSIS — M79.605 PAIN IN LEFT LEG: ICD-10-CM

## 2024-07-19 PROCEDURE — 72148 MRI LUMBAR SPINE W/O DYE: CPT | Mod: MH

## 2024-07-19 PROCEDURE — 72148 MRI LUMBAR SPINE W/O DYE: CPT | Mod: 26,MH

## 2024-08-22 NOTE — REVIEW OF SYSTEMS
Patients last appointment 5/24/2024.  Patients next scheduled appointment   Future Appointments   Date Time Provider Department Center   8/30/2024  9:15 AM Ken Buchanan MD [facility]   9/3/2024  8:30 AM Rosalio Gupta APRN - [facility] E[facility] DEP      
[Negative] : Heme/Lymph

## 2024-09-06 ENCOUNTER — APPOINTMENT (OUTPATIENT)
Dept: MAMMOGRAPHY | Facility: CLINIC | Age: 79
End: 2024-09-06
Payer: MEDICARE

## 2024-09-06 ENCOUNTER — APPOINTMENT (OUTPATIENT)
Dept: ULTRASOUND IMAGING | Facility: CLINIC | Age: 79
End: 2024-09-06
Payer: MEDICARE

## 2024-09-06 ENCOUNTER — OUTPATIENT (OUTPATIENT)
Dept: OUTPATIENT SERVICES | Facility: HOSPITAL | Age: 79
LOS: 1 days | End: 2024-09-06
Payer: MEDICARE

## 2024-09-06 DIAGNOSIS — Z00.00 ENCOUNTER FOR GENERAL ADULT MEDICAL EXAMINATION WITHOUT ABNORMAL FINDINGS: ICD-10-CM

## 2024-09-06 DIAGNOSIS — Z90.49 ACQUIRED ABSENCE OF OTHER SPECIFIED PARTS OF DIGESTIVE TRACT: Chronic | ICD-10-CM

## 2024-09-06 PROCEDURE — 77067 SCR MAMMO BI INCL CAD: CPT

## 2024-09-06 PROCEDURE — 77067 SCR MAMMO BI INCL CAD: CPT | Mod: 26

## 2024-09-06 PROCEDURE — 77063 BREAST TOMOSYNTHESIS BI: CPT | Mod: 26

## 2024-09-06 PROCEDURE — 76641 ULTRASOUND BREAST COMPLETE: CPT | Mod: 26,50,3G

## 2024-09-06 PROCEDURE — 76641 ULTRASOUND BREAST COMPLETE: CPT

## 2024-09-06 PROCEDURE — 77063 BREAST TOMOSYNTHESIS BI: CPT

## 2024-09-12 ENCOUNTER — APPOINTMENT (OUTPATIENT)
Dept: ENDOCRINOLOGY | Facility: CLINIC | Age: 79
End: 2024-09-12
Payer: MEDICARE

## 2024-09-12 VITALS
DIASTOLIC BLOOD PRESSURE: 58 MMHG | SYSTOLIC BLOOD PRESSURE: 122 MMHG | HEART RATE: 78 BPM | WEIGHT: 134 LBS | BODY MASS INDEX: 24.35 KG/M2 | OXYGEN SATURATION: 98 % | HEIGHT: 62.25 IN

## 2024-09-12 DIAGNOSIS — M81.0 AGE-RELATED OSTEOPOROSIS W/OUT CURRENT PATHOLOGICAL FRACTURE: ICD-10-CM

## 2024-09-12 DIAGNOSIS — E04.0 NONTOXIC DIFFUSE GOITER: ICD-10-CM

## 2024-09-12 PROCEDURE — 77080 DXA BONE DENSITY AXIAL: CPT | Mod: GA

## 2024-09-12 PROCEDURE — ZZZZZ: CPT

## 2024-09-12 PROCEDURE — 96372 THER/PROPH/DIAG INJ SC/IM: CPT

## 2024-09-12 PROCEDURE — 99214 OFFICE O/P EST MOD 30 MIN: CPT | Mod: 25

## 2024-09-12 RX ORDER — DENOSUMAB 60 MG/ML
60 INJECTION SUBCUTANEOUS
Qty: 1 | Refills: 0 | Status: COMPLETED | OUTPATIENT
Start: 2024-09-12

## 2024-09-12 RX ADMIN — DENOSUMAB 60 MG/ML: 60 INJECTION SUBCUTANEOUS at 00:00

## 2024-09-12 NOTE — PHYSICAL EXAM
[Alert] : alert [Well Nourished] : well nourished [No Acute Distress] : no acute distress [Well Developed] : well developed [Normal Sclera/Conjunctiva] : normal sclera/conjunctiva [EOMI] : extra ocular movement intact [No Proptosis] : no proptosis [Normal Oropharynx] : the oropharynx was normal [No Thyroid Nodules] : no palpable thyroid nodules [No Respiratory Distress] : no respiratory distress [No Accessory Muscle Use] : no accessory muscle use [Clear to Auscultation] : lungs were clear to auscultation bilaterally [Normal S1, S2] : normal S1 and S2 [Normal Rate] : heart rate was normal [Regular Rhythm] : with a regular rhythm [No Edema] : no peripheral edema [Normal Bowel Sounds] : normal bowel sounds [Not Tender] : non-tender [Not Distended] : not distended [Soft] : abdomen soft [Normal Anterior Cervical Nodes] : no anterior cervical lymphadenopathy [No Spinal Tenderness] : no spinal tenderness [Spine Straight] : spine straight [No Stigmata of Cushings Syndrome] : no stigmata of Cushings Syndrome [Normal Gait] : normal gait [Normal Strength/Tone] : muscle strength and tone were normal [Normal Reflexes] : deep tendon reflexes were 2+ and symmetric [No Tremors] : no tremors [Oriented x3] : oriented to person, place, and time [de-identified] : Missing right lower incisor [de-identified] : minimal goiter [de-identified] : feet wrapped in ace bandages from podiatry

## 2024-09-12 NOTE — ASSESSMENT
[Denosumab Therapy] : Risks  and benefits of denosumab therapy were discussed with the patient including eczema, cellulitis, osteonecrosis of the jaw and atypical femur fractures [Bisphosphonate Therapy] : Risks and benefits of bisphosphonate therapy were  discussed with the patient including gastroesophageal irritation, osteonecrosis of the jaw, and atypical femur fractures, and acute phase reaction [Bisphosphonates] : The patient was instructed to take bisphosphonates on an empty stomach with a full glass of water,and wait at least 30 minutes before eating or lying down [FreeTextEntry1] : 79-year-old female with:  1. Postmenopausal osteoporosis: h/o low bone density. Last dose of IV Reclast 2012. BMD significantly decreased in femoral neck and total hip vs prior study. Options of therapy reviewed. Pt initially elected to take Reclast as she tolerated this well in the past. Referred to rheumatology but decided not to take Reclast on advice of DDS. Began Prolia January 2017. Tolerating well.  . BMD 07/2022 indicates worsened osteoporosis in the spine, stable osteoporosis in total hip, stable osteopenia in fem neck and prox. radius.   Pt missed dose of Prolia 01/2023 due to ongoing dental work. BMD 06/2023 indicates improved osteopenia in the spine (no obvious arthritic artifact), worsened osteoporosis in total hip and fem neck, stable osteopenia in prox. radius.   Patient advised of risk of fracture if she does not begin medical therapy for osteoporosis. Given her need for upcoming dental work she would be a good candidate for anabolic therapy first but she declines. Pt was on short-term Actonel 35 mg a week weekly with the expectation that she will stop the medication in advance of upcoming dental work. Pt resumed Prolia August 2023. BMD 09/2024 indicates stable osteopenia in spine and prox. radius, improved osteopenia in total hip and fem neck. BMD results reviewed w/pt. Continue Prolia, buy and bill.   2. Small goiter on exam: pt clinically and chemically euthyroid. US shows no nodules. No local neck pain. No dysphagia or dysphonia. No raciness, shakiness, heat/cold intolerance, tiredness, or fatigue. No palpitations, tremors, or sudden weight gain/loss. TFTs Feb 2020 normal. TUS 3/2021 indicates heterogenous, enlarged, no nodules. Observe.  3. H/o low Vit D, on Vitamin D 2000 UI. plus 50k once weekly.  Call Dr. Clay Draper for labs   Labs: July 2024  Creatinine: 0.90 Calcium: 9.0 TSH: 5.2   F/u in 6 months

## 2024-09-12 NOTE — PHYSICAL EXAM
[Alert] : alert [Well Nourished] : well nourished [No Acute Distress] : no acute distress [Well Developed] : well developed [Normal Sclera/Conjunctiva] : normal sclera/conjunctiva [EOMI] : extra ocular movement intact [No Proptosis] : no proptosis [Normal Oropharynx] : the oropharynx was normal [No Thyroid Nodules] : no palpable thyroid nodules [No Respiratory Distress] : no respiratory distress [No Accessory Muscle Use] : no accessory muscle use [Clear to Auscultation] : lungs were clear to auscultation bilaterally [Normal S1, S2] : normal S1 and S2 [Normal Rate] : heart rate was normal [Regular Rhythm] : with a regular rhythm [No Edema] : no peripheral edema [Normal Bowel Sounds] : normal bowel sounds [Not Tender] : non-tender [Not Distended] : not distended [Soft] : abdomen soft [Normal Anterior Cervical Nodes] : no anterior cervical lymphadenopathy [No Spinal Tenderness] : no spinal tenderness [Spine Straight] : spine straight [No Stigmata of Cushings Syndrome] : no stigmata of Cushings Syndrome [Normal Gait] : normal gait [Normal Strength/Tone] : muscle strength and tone were normal [Normal Reflexes] : deep tendon reflexes were 2+ and symmetric [No Tremors] : no tremors [Oriented x3] : oriented to person, place, and time [de-identified] : Missing right lower incisor [de-identified] : minimal goiter [de-identified] : feet wrapped in ace bandages from podiatry

## 2024-09-12 NOTE — HISTORY OF PRESENT ILLNESS
[Zoledronic Acid (Zometa)] : Zoledronic Acid [Prolia (Denosumab)] : Prolia (Denosumab) [FreeTextEntry1] :  Pt returns for a follow-up visit for osteoporosis. Pt was hospitalized in 2024 for numbness in her left side. Pt had a full workup which was negative.  Up to date with dentist. No major dental work planned.   H/o low bone density. Last dose of IV Reclast . BMD significantly decreased in femoral neck and total hip vs prior study. Options of therapy reviewed. Pt initially elected to take Reclast as she tolerated this well in the past. Referred to rheumatology but decided not to take Reclast on advice of DDS. Began Prolia 2017. Tolerating well. BMD 2018 indicates stable osteopenia in the spine (not accurate due to arthritis), stable osteoporosis in total hip, improved osteopenia in fem neck and stable osteopenia in prox. radius. BMD results reviewed w/pt. BMD 2020 indicates improved osteopenia in the spine and fem neck, stable osteopenia in total hip and prox. radius. BMD results reviewed w/pt. BMD 2022 indicates worsened osteoporosis in the spine, stable osteoporosis in total hip, stable osteopenia in fem neck and prox. radius. BMD results reviewed w/pt. Pt missed dose of Prolia 2023 due to ongoing dental work. BMD 2023 indicates improved osteopenia in the spine (no obvious arthritic artifact), worsened osteoporosis in total hip and fem neck, stable osteopenia in prox. radius. BMD results reviewed w/pt.  Patient advised of risk of fracture if she does not begin medical therapy for osteoporosis. Given her need for upcoming dental work she would be a good candidate for anabolic therapy first but she declines. Pt was on short-term Actonel 35 mg a week weekly with the expectation that she will stop the medication in advance of upcoming dental work. Pt resumed Prolia 2023. BMD 2024 indicates stable osteopenia in spine and prox. radius, improved osteopenia in total hip and fem neck. BMD results reviewed w/pt.   Small goiter on exam: pt clinically and chemically euthyroid. US shows no nodules. No local neck pain. No dysphagia or dysphonia. No raciness, shakiness, heat/cold intolerance, tiredness, or fatigue. No palpitations, tremors, or sudden weight gain/loss. TFTs 2020 normal. TUS 3/2021 indicates heterogenous, enlarged, no nodules. Observe.  Active GERD, has recent EGD, not ideal for oral treatment.  H/o of low Vit D currently on Vitamin D 2000 UI daily.  ill bladder Ca, pt  2020.

## 2024-09-12 NOTE — PROCEDURE
[FreeTextEntry1] : Bone Mineral Density: 09/12/2024 Indication: Comparison to 2023, assess response to medication Spine: -1.5, osteopenia, no significant change. Total hip: -2.3, osteopenia, +8.7%     Femoral neck: -2.0, osteopenia, +8.5%           Proximal radius: -2.2, osteopenia, no significant change  Bone mineral density: 06/21/2023 indication:compared to 2022 prior test showed rapid bone loss spine -1.5 osteopenia +7.0% no obvious arthritic artifact total hip -2.7 osteoporosis -4.2% femoral neck -2.4 osteoporosis -7.2% proximal radius -2.2 osteopenia no significant change  Bone Density July 21, 2022 Indication vs 2021 Asses response to medication  Spine-2.1 osteoporosis -4.8% Total Hip-2.5 osteoporosis , no significant change  Femoral Neck -2.0 osteopenia , no significant change  Proximal Radius -2.3 osteopenia , no significant change   Thyroid US - 03/12/2021 heterogenous, enlarged, no nodules  Bone mineral density: 02/24/2020  Indication: vs. 2018 Spine: -1.7 osteopenia, +5.2% Total hip: -2.4 osteopenia, no significant change Femoral neck: -1.9 osteopenia, +11.9% Proximal radius: -2.2 osteopenia, no significant change, improved vs. 2014 and 2016  Bone mineral density 2//9/18 indication:  assess response to medication prolia  spine -2.1 osteopenia but Not accurate because of arthritis no significant change  total hip -2.5 osteoporosis no significant change  femoral neck -2.4 osteopenia +3.7% proximal radius -2.3, osteopenia, no significant change   Bone mineral density August 17, 2016 2 year follow-up Spine -2.1, osteopenia, no significant change  Total hi[p -2.6, osteoporosis, -4.6%  Femoral neck -2.6, osteoporosis, -8.6% Proximal radius -2.5, osteoporosis, no significant change  thyroid US 5/22/15 small goiter, heterogenous, no nodules  bone mineral density performed May 20, 2014 Indication assess response to intravenous therapy Spine -2.2, osteopenia, no significant change versus 2013 Total hip -2.4, osteopenia, no significant change versus 2013 Femoral neck - 2.2, osteopenia, no significant change versus 2013 Proximal radius -2.8, osteoporosis, no significant change versus 2013

## 2024-09-12 NOTE — END OF VISIT
[FreeTextEntry3] :  This note was written by Shea Dacosta on (September 12, 2024) acting as a medical scribe for Dr. Diana This note was authored by the medical scribe for me. I have reviewed, edited, and revised the note as needed. I am in agreement with the exam findings, imaging findings, and treatment plan.  Td Diana MD

## 2024-10-17 NOTE — H&P ADULT - PROBLEM SELECTOR PROBLEM 3
Patient did not tolerate generic vyvanse 40 mg. Unable to afford vyvanse brand name.  Will decrease dose of generic vyvanse to 30 mg. Patient to follow up if does not tolerate.    Need for prophylactic measure Electrolyte abnormality

## 2024-11-13 ENCOUNTER — APPOINTMENT (OUTPATIENT)
Dept: SURGERY | Facility: CLINIC | Age: 79
End: 2024-11-13
Payer: MEDICARE

## 2024-11-13 PROCEDURE — 99213K: CUSTOM

## 2025-02-03 NOTE — PATIENT PROFILE ADULT - NSPRESCRALCSCORE_GEN_A_NUR_CAL
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      She's currently scheduled at 2:00, would you be ok with me double-booking the later PO or routine OB and blocking the 2:00?   4

## 2025-03-13 ENCOUNTER — APPOINTMENT (OUTPATIENT)
Dept: ENDOCRINOLOGY | Facility: CLINIC | Age: 80
End: 2025-03-13
Payer: MEDICARE

## 2025-03-13 DIAGNOSIS — M81.0 AGE-RELATED OSTEOPOROSIS W/OUT CURRENT PATHOLOGICAL FRACTURE: ICD-10-CM

## 2025-03-13 PROCEDURE — 96372 THER/PROPH/DIAG INJ SC/IM: CPT

## 2025-03-13 RX ORDER — DENOSUMAB 60 MG/ML
60 INJECTION SUBCUTANEOUS
Qty: 1 | Refills: 0 | Status: COMPLETED | OUTPATIENT
Start: 2025-03-11

## 2025-04-08 ENCOUNTER — OUTPATIENT (OUTPATIENT)
Dept: OUTPATIENT SERVICES | Facility: HOSPITAL | Age: 80
LOS: 1 days | End: 2025-04-08
Payer: MEDICARE

## 2025-04-08 ENCOUNTER — APPOINTMENT (OUTPATIENT)
Dept: MRI IMAGING | Facility: CLINIC | Age: 80
End: 2025-04-08
Payer: MEDICARE

## 2025-04-08 DIAGNOSIS — Z90.49 ACQUIRED ABSENCE OF OTHER SPECIFIED PARTS OF DIGESTIVE TRACT: Chronic | ICD-10-CM

## 2025-04-08 DIAGNOSIS — Z00.00 ENCOUNTER FOR GENERAL ADULT MEDICAL EXAMINATION WITHOUT ABNORMAL FINDINGS: ICD-10-CM

## 2025-04-08 PROCEDURE — C8908: CPT | Mod: MH

## 2025-04-08 PROCEDURE — C8937: CPT

## 2025-04-08 PROCEDURE — 77049 MRI BREAST C-+ W/CAD BI: CPT | Mod: 26

## 2025-04-08 PROCEDURE — A9585: CPT

## 2025-06-02 ENCOUNTER — INPATIENT (INPATIENT)
Facility: HOSPITAL | Age: 80
LOS: 6 days | Discharge: HOME | DRG: 812 | End: 2025-06-09
Attending: INTERNAL MEDICINE | Admitting: INTERNAL MEDICINE
Payer: MEDICARE

## 2025-06-02 VITALS
HEART RATE: 94 BPM | HEIGHT: 62 IN | RESPIRATION RATE: 15 BRPM | OXYGEN SATURATION: 99 % | TEMPERATURE: 98 F | DIASTOLIC BLOOD PRESSURE: 71 MMHG | SYSTOLIC BLOOD PRESSURE: 112 MMHG | WEIGHT: 125 LBS

## 2025-06-02 DIAGNOSIS — Z90.49 ACQUIRED ABSENCE OF OTHER SPECIFIED PARTS OF DIGESTIVE TRACT: Chronic | ICD-10-CM

## 2025-06-02 DIAGNOSIS — D62 ACUTE POSTHEMORRHAGIC ANEMIA: ICD-10-CM

## 2025-06-02 LAB
ALBUMIN SERPL ELPH-MCNC: 3.9 G/DL — SIGNIFICANT CHANGE UP (ref 3.3–5)
ALP SERPL-CCNC: 59 U/L — SIGNIFICANT CHANGE UP (ref 40–120)
ALT FLD-CCNC: 27 U/L — SIGNIFICANT CHANGE UP (ref 10–45)
ANION GAP SERPL CALC-SCNC: 13 MMOL/L — SIGNIFICANT CHANGE UP (ref 5–17)
APTT BLD: 37.2 SEC — HIGH (ref 26.1–36.8)
AST SERPL-CCNC: 25 U/L — SIGNIFICANT CHANGE UP (ref 10–40)
BASOPHILS # BLD AUTO: 0.03 K/UL — SIGNIFICANT CHANGE UP (ref 0–0.2)
BASOPHILS NFR BLD AUTO: 0.7 % — SIGNIFICANT CHANGE UP (ref 0–2)
BILIRUB SERPL-MCNC: 0.6 MG/DL — SIGNIFICANT CHANGE UP (ref 0.2–1.2)
BUN SERPL-MCNC: 16 MG/DL — SIGNIFICANT CHANGE UP (ref 7–23)
CALCIUM SERPL-MCNC: 8.3 MG/DL — LOW (ref 8.4–10.5)
CHLORIDE SERPL-SCNC: 104 MMOL/L — SIGNIFICANT CHANGE UP (ref 96–108)
CO2 SERPL-SCNC: 21 MMOL/L — LOW (ref 22–31)
CREAT SERPL-MCNC: 0.88 MG/DL — SIGNIFICANT CHANGE UP (ref 0.5–1.3)
EGFR: 66 ML/MIN/1.73M2 — SIGNIFICANT CHANGE UP
EGFR: 66 ML/MIN/1.73M2 — SIGNIFICANT CHANGE UP
EOSINOPHIL # BLD AUTO: 0.05 K/UL — SIGNIFICANT CHANGE UP (ref 0–0.5)
EOSINOPHIL NFR BLD AUTO: 1.2 % — SIGNIFICANT CHANGE UP (ref 0–6)
GAS PNL BLDV: SIGNIFICANT CHANGE UP
GLUCOSE SERPL-MCNC: 114 MG/DL — HIGH (ref 70–99)
HCT VFR BLD CALC: 27.2 % — LOW (ref 34.5–45)
HGB BLD-MCNC: 8.4 G/DL — LOW (ref 11.5–15.5)
IMM GRANULOCYTES NFR BLD AUTO: 0.7 % — SIGNIFICANT CHANGE UP (ref 0–0.9)
INR BLD: 1.13 RATIO — SIGNIFICANT CHANGE UP (ref 0.85–1.16)
LYMPHOCYTES # BLD AUTO: 1.05 K/UL — SIGNIFICANT CHANGE UP (ref 1–3.3)
LYMPHOCYTES # BLD AUTO: 24.2 % — SIGNIFICANT CHANGE UP (ref 13–44)
MCHC RBC-ENTMCNC: 27.6 PG — SIGNIFICANT CHANGE UP (ref 27–34)
MCHC RBC-ENTMCNC: 30.9 G/DL — LOW (ref 32–36)
MCV RBC AUTO: 89.5 FL — SIGNIFICANT CHANGE UP (ref 80–100)
MONOCYTES # BLD AUTO: 0.33 K/UL — SIGNIFICANT CHANGE UP (ref 0–0.9)
MONOCYTES NFR BLD AUTO: 7.6 % — SIGNIFICANT CHANGE UP (ref 2–14)
NEUTROPHILS # BLD AUTO: 2.85 K/UL — SIGNIFICANT CHANGE UP (ref 1.8–7.4)
NEUTROPHILS NFR BLD AUTO: 65.6 % — SIGNIFICANT CHANGE UP (ref 43–77)
NRBC BLD AUTO-RTO: 0 /100 WBCS — SIGNIFICANT CHANGE UP (ref 0–0)
PLATELET # BLD AUTO: 197 K/UL — SIGNIFICANT CHANGE UP (ref 150–400)
POTASSIUM SERPL-MCNC: 4.8 MMOL/L — SIGNIFICANT CHANGE UP (ref 3.5–5.3)
POTASSIUM SERPL-SCNC: 4.8 MMOL/L — SIGNIFICANT CHANGE UP (ref 3.5–5.3)
PROT SERPL-MCNC: 6.2 G/DL — SIGNIFICANT CHANGE UP (ref 6–8.3)
PROTHROM AB SERPL-ACNC: 12.9 SEC — SIGNIFICANT CHANGE UP (ref 9.9–13.4)
RBC # BLD: 3.04 M/UL — LOW (ref 3.8–5.2)
RBC # FLD: 15.6 % — HIGH (ref 10.3–14.5)
SODIUM SERPL-SCNC: 138 MMOL/L — SIGNIFICANT CHANGE UP (ref 135–145)
WBC # BLD: 4.34 K/UL — SIGNIFICANT CHANGE UP (ref 3.8–10.5)
WBC # FLD AUTO: 4.34 K/UL — SIGNIFICANT CHANGE UP (ref 3.8–10.5)

## 2025-06-02 PROCEDURE — 99285 EMERGENCY DEPT VISIT HI MDM: CPT | Mod: FS,GC

## 2025-06-02 PROCEDURE — 71046 X-RAY EXAM CHEST 2 VIEWS: CPT | Mod: 26

## 2025-06-02 PROCEDURE — 93010 ELECTROCARDIOGRAM REPORT: CPT

## 2025-06-02 PROCEDURE — 99222 1ST HOSP IP/OBS MODERATE 55: CPT | Mod: GC

## 2025-06-02 RX ORDER — IRON SUCROSE 20 MG/ML
300 INJECTION, SOLUTION INTRAVENOUS ONCE
Refills: 0 | Status: COMPLETED | OUTPATIENT
Start: 2025-06-02 | End: 2025-06-02

## 2025-06-02 RX ORDER — SODIUM CHLORIDE 9 G/1000ML
1000 INJECTION, SOLUTION INTRAVENOUS
Refills: 0 | Status: DISCONTINUED | OUTPATIENT
Start: 2025-06-02 | End: 2025-06-09

## 2025-06-02 RX ORDER — MELATONIN 5 MG
3 TABLET ORAL AT BEDTIME
Refills: 0 | Status: DISCONTINUED | OUTPATIENT
Start: 2025-06-02 | End: 2025-06-09

## 2025-06-02 RX ORDER — ATORVASTATIN CALCIUM 80 MG/1
10 TABLET, FILM COATED ORAL AT BEDTIME
Refills: 0 | Status: DISCONTINUED | OUTPATIENT
Start: 2025-06-02 | End: 2025-06-06

## 2025-06-02 RX ORDER — ONDANSETRON HCL/PF 4 MG/2 ML
4 VIAL (ML) INJECTION THREE TIMES A DAY
Refills: 0 | Status: DISCONTINUED | OUTPATIENT
Start: 2025-06-02 | End: 2025-06-09

## 2025-06-02 RX ADMIN — IRON SUCROSE 176.67 MILLIGRAM(S): 20 INJECTION, SOLUTION INTRAVENOUS at 16:24

## 2025-06-02 RX ADMIN — Medication 40 MILLIGRAM(S): at 16:05

## 2025-06-02 RX ADMIN — SODIUM CHLORIDE 70 MILLILITER(S): 9 INJECTION, SOLUTION INTRAVENOUS at 23:33

## 2025-06-02 NOTE — CONSULT NOTE ADULT - SUBJECTIVE AND OBJECTIVE BOX
Chief Complaint:  Patient is a 80y old  Female who presents with a chief complaint of     HPI:  80-year-old female PMHx GERD, HTN, remote hx PUD, chronic idiopathic small bowel pseudoobstruction (usually on PPI, reglan) of intestine presents ED complaining of black stools x3 days.  Patient states she had EGD this past Friday with her GI (Dr. Roscoe Lorenzo) and has a very narrowed proximal duodenum that the endoscope could not pass. The scope was done  because OP GI had gotten a CTAP for evaluation of weight loss. The CTAP had shown concern for duodenal stenosis with upstream dilation and so then OP GI pursued EGD. Does endorse some mild fatigue.  At this time denies chest pain, shortness of breath, dizziness, abdominal pain.  In the ED, the patient is awake,alert, and oriented x3. She is well appearing and in no acute distress. She is normotensive, HR in the 90s, afebrile, and satting 99% on room air. Abd is distended but non tender. Lungs are CTAB. No conjunctival pallor. Moist mucous membranes. H/H 8.4/27.2. Receiving IV iron, pantoprazole.    Allergies:  penicillins (Unknown)  sulfa drugs (Hives)    PMHX/PSHX:    Chronic idiopathic pseudo-obstruction of intestine    Breast cancer    HLD (hyperlipidemia)    GERD (gastroesophageal reflux disease)    Osteoporosis    S/P small bowel resection        Family history:    Denies family history of colon cancer/polyps, stomach cancer/polyps, pancreatic cancer/masses, liver cancer/disease, ovarian cancer and endometrial cancer.    Social History:     Tob: Denies  EtOH: Denies  Illicit Drugs: Denies    ROS:   General:  No wt loss, fevers, chills, night sweats, fatigue  Eyes:  Good vision, no reported pain  ENT:  No sore throat, pain, runny nose, dysphagia  CV:  No pain, palpitations, hypo/hypertension  Pulm:  No dyspnea, cough, tachypnea, wheezing  GI:  As per HPI  :  No pain, bleeding, incontinence, nocturia  Muscle:  No pain, weakness  Neuro:  No weakness, tingling, memory problems  Psych:  No fatigue, insomnia, mood problems, depression  Endocrine:  No polyuria, polydipsia, cold/heat intolerance  Heme:  No petechiae, ecchymosis, easy bruisability  Skin:  No rash, tattoos, scars, edema    PHYSICAL EXAM:   GENERAL:  No acute distress  HEENT:  Normocephalic/atraumatic  CHEST:  No accessory muscle use  HEART:  Regular rate and rhythm  ABDOMEN:  Soft, non-tender, +distended  SKIN:  No rash  NEURO:  Alert and oriented x 3    Vital Signs:  Vital Signs Last 24 Hrs  T(C): 36.6 (2025 13:14), Max: 36.6 (2025 13:14)  T(F): 97.9 (2025 13:14), Max: 97.9 (2025 13:14)  HR: 94 (2025 13:14) (94 - 94)  BP: 112/71 (2025 13:14) (112/71 - 112/71)  BP(mean): --  RR: 15 (2025 13:14) (15 - 15)  SpO2: 99% (2025 13:14) (99% - 99%)    Parameters below as of 2025 13:14  Patient On (Oxygen Delivery Method): room air      Daily Height in cm: 157.48 (2025 13:14)    Daily     LABS:                        8.4    4.34  )-----------( 197      ( 2025 14:40 )             27.2     Mean Cell Volume: 89.5 fl (25 @ 14:40)        138  |  104  |  16  ----------------------------<  114[H]  4.8   |  21[L]  |  0.88    Ca    8.3[L]      2025 14:40    TPro  6.2  /  Alb  3.9  /  TBili  0.6  /  DBili  x   /  AST  25  /  ALT  27  /  AlkPhos  59  06-02    LIVER FUNCTIONS - ( 2025 14:40 )  Alb: 3.9 g/dL / Pro: 6.2 g/dL / ALK PHOS: 59 U/L / ALT: 27 U/L / AST: 25 U/L / GGT: x           PT/INR - ( 2025 14:40 )   PT: 12.9 sec;   INR: 1.13 ratio         PTT - ( 2025 14:40 )  PTT:37.2 sec  Urinalysis Basic - ( 2025 14:40 )    Color: x / Appearance: x / SG: x / pH: x  Gluc: 114 mg/dL / Ketone: x  / Bili: x / Urobili: x   Blood: x / Protein: x / Nitrite: x   Leuk Esterase: x / RBC: x / WBC x   Sq Epi: x / Non Sq Epi: x / Bacteria: x                              8.4    4.34  )-----------( 197      ( 2025 14:40 )             27.2       Imagin/30 EGD - Dr. Roscoe Nova  Findings:    The lumen of the esophagus was moderately dilated.    There were esophageal mucosal changes classified as Muir's stage    C13-14 per [place] criteria present in the middle third of the esophagus    and in the lower third of the esophagus. The maximum longitudinal extent of these mucosal changes was 14 cm in length. This was biopsied less then 6 months ago thus repeat biopsies were not obtained. There were no    concerning findings (such as nod-ules, masses or ulcers) seen.    The entire examined stomach was normal.    No gross lesions were noted in the duodenal bulb. Duodenum was markedly dilated. There was solid and liquid debris present in the second por-    tion    of the duodenum precluding adequate visualization. Attempts to advanced the endoscopy resulted in looping in the dilated duodenal bulb. No gross stenosis was seen but exam was    quite limited.       Chief Complaint:  Patient is a 80y old  Female who presents with a chief complaint of     HPI:  80-year-old female PMHx breast Ca s/p RTX, GERD, HTN, remote hx PUD, remote hx SBO s/p SBR , chronic idiopathic small bowel pseudoobstruction (usually on PPI, reglan, pepcid, miralax) who presents to ED for a chief complaint of black stools x3 days with incr BM frequency and stools softer.  Patient states she had EGD this past  with her GI (Dr. Roscoe Lorenzo) and had a very narrowed proximal duodenum with debris in esophagus/stomach that the endoscope could not pass (impression from scope is below). The scope was done because OP GI had gotten a CTAP for evaluation of weight loss about 1.5 weeks ago. The CTAP had shown concern for duodenal narrowing with upstream dilation and so then  EGD was performed. Allisonn reports she went to ER at [facility] on Friday after her EGD for dizziness/fatigue and received IV fluids after which the dark stools started. Currently Patient reports mild fatigue and dizziness.  At this time denies chest pain, shortness of breath, dizziness, abdominal pain. Patient reports that her BM's are usually light brown and she goes BiD at baseline. She does not take iron at home. Reports abdomen has been distended for many years. She reports before Friday she had an EGD/Colonoscopy 6 months ago with Dr. Lorenzo as well which were grossly normal to her knowledge (no reports available). Denies n/v/f/c/ap. She feels hungry currently. Reports eating a lot on .    In the ED, the patient is awake, alert, and oriented x3. She is well appearing and in no acute distress. She is normotensive, HR in the 90s, afebrile, and satting 99% on room air. Abd is distended but non tender. Lungs are CTAB. No conjunctival pallor. Moist mucous membranes. H/H 8.4/27.2. Receiving IV iron, pantoprazole. NAN with chaperone reveals black stool (no red blood visualized).  Allergies:  penicillins (Unknown)  sulfa drugs (Hives)    PMHX/PSHX:    Chronic idiopathic pseudo-obstruction of intestine    Breast cancer    HLD (hyperlipidemia)    GERD (gastroesophageal reflux disease)    Osteoporosis    S/P small bowel resection         Family history:    Denies family history of colon cancer/polyps, stomach cancer/polyps, pancreatic cancer/masses, liver cancer/disease, ovarian cancer and endometrial cancer.    Social History:     Tob: Denies  EtOH: Denies  Illicit Drugs: Denies    ROS:   General:  No wt loss, fevers, chills, night sweats, fatigue  Eyes:  Good vision, no reported pain  ENT:  No sore throat, pain, runny nose, dysphagia  CV:  No pain, palpitations, hypo/hypertension  Pulm:  No dyspnea, cough, tachypnea, wheezing  GI:  As per HPI  :  No pain, bleeding, incontinence, nocturia  Muscle:  No pain, weakness  Neuro:  No weakness, tingling, memory problems  Psych:  No fatigue, insomnia, mood problems, depression  Endocrine:  No polyuria, polydipsia, cold/heat intolerance  Heme:  No petechiae, ecchymosis, easy bruisability  Skin:  No rash, tattoos, scars, edema    PHYSICAL EXAM:   GENERAL:  No acute distress  HEENT:  Normocephalic/atraumatic  CHEST:  No accessory muscle use  HEART:  Regular rate and rhythm  ABDOMEN:  Soft, non-tender, +distended  NAN with Chaperone 6/2 - Scant black stool visualized. No red blood.  SKIN:  No rash  NEURO:  Alert and oriented x 3    Vital Signs:  Vital Signs Last 24 Hrs  T(C): 36.6 (2025 13:14), Max: 36.6 (2025 13:14)  T(F): 97.9 (2025 13:14), Max: 97.9 (2025 13:14)  HR: 94 (2025 13:14) (94 - 94)  BP: 112/71 (2025 13:14) (112/71 - 112/71)  BP(mean): --  RR: 15 (2025 13:14) (15 - 15)  SpO2: 99% (2025 13:14) (99% - 99%)    Parameters below as of 2025 13:14  Patient On (Oxygen Delivery Method): room air      Daily Height in cm: 157.48 (2025 13:14)    Daily     LABS:                        8.4    4.34  )-----------( 197      ( 2025 14:40 )             27.2     Mean Cell Volume: 89.5 fl (25 @ 14:40)        138  |  104  |  16  ----------------------------<  114[H]  4.8   |  21[L]  |  0.88    Ca    8.3[L]      2025 14:40    TPro  6.2  /  Alb  3.9  /  TBili  0.6  /  DBili  x   /  AST  25  /  ALT  27  /  AlkPhos  59      LIVER FUNCTIONS - ( 2025 14:40 )  Alb: 3.9 g/dL / Pro: 6.2 g/dL / ALK PHOS: 59 U/L / ALT: 27 U/L / AST: 25 U/L / GGT: x           PT/INR - ( 2025 14:40 )   PT: 12.9 sec;   INR: 1.13 ratio         PTT - ( 2025 14:40 )  PTT:37.2 sec  Urinalysis Basic - ( 2025 14:40 )    Color: x / Appearance: x / SG: x / pH: x  Gluc: 114 mg/dL / Ketone: x  / Bili: x / Urobili: x   Blood: x / Protein: x / Nitrite: x   Leuk Esterase: x / RBC: x / WBC x   Sq Epi: x / Non Sq Epi: x / Bacteria: x                              8.4    4.34  )-----------( 197      ( 2025 14:40 )             27.2       Imagin/30 EGD - Dr. Roscoe Nova  Findings:    The lumen of the esophagus was moderately dilated.  There were esophageal mucosal changes classified as Muir's stage  C13-14 per [place] criteria present in the middle third of the esophagus  and in the lower third of the esophagus. The maximum longitudinal extent of these mucosal changes was 14 cm in length. This was biopsied less then 6 months ago thus repeat biopsies were not obtained. There were no  concerning findings (such as nod-ules, masses or ulcers) seen.  The entire examined stomach was normal.  No gross lesions were noted in the duodenal bulb. Duodenum was markedly dilated. There was solid and liquid debris present in the second por-tion  of the duodenum precluding adequate visualization. Attempts to advanced the endoscopy resulted in looping in the dilated duodenal bulb. No gross stenosis was seen but exam was quite limited.

## 2025-06-02 NOTE — H&P ADULT - NSHPPHYSICALEXAM_GEN_ALL_CORE
T(C): 36.9 (06-02-25 @ 19:21), Max: 36.9 (06-02-25 @ 18:30)  HR: 80 (06-02-25 @ 19:21) (80 - 94)  BP: 156/67 (06-02-25 @ 19:21) (112/71 - 173/70)  RR: 18 (06-02-25 @ 19:21) (15 - 20)  SpO2: 94% (06-02-25 @ 19:21) (94% - 99%)  GENERAL: NAD, lying in bed comfortably  HEAD:  Atraumatic, normocephalic  EYES: EOMI, PERRLA, conjunctiva and sclera clear  NECK: Supple, trachea midline, no JVD  HEART: Regular rate and rhythm, no murmurs, rubs, or gallops  LUNGS: Unlabored respirations.  Clear to auscultation bilaterally, no crackles, wheezing, or rhonchi  ABDOMEN: Soft, nontender, nondistended, +BS  EXTREMITIES: 2+ peripheral pulses bilaterally. No clubbing, cyanosis, or edema  NERVOUS SYSTEM:  A&Ox3, moving all extremities, no focal deficits   SKIN: No rashes or lesions

## 2025-06-02 NOTE — ED PROVIDER NOTE - RAPID ASSESSMENT
80-year-old female PMHx GERD, HTN, chronic idiopathic pseudoobstruction of intestine presents ED complaining of black stools since this morning.  Patient states she had endoscopy this past Friday with her GI (Dr. Roscoe Lorenzo) and states she was told "area between stomach and esophagus was very narrow, could not pass tube".  Additionally of note she had been fasting prior to endoscopy, morning of procedure did have 2 episodes of witnessed syncope without head trauma, did endorse preceding lightheadedness prior to both episodes.  Feeling well since endoscopy until today, noted black stools with her bowel movement which was new, called GI was advised to come to ED.  Does endorse some mild fatigue.  At this time denies chest pain, shortness of breath, dizziness, abdominal pain.    **Patient was rapidly assessed by me, Michael Hernandez PA-C. A limited history was obtained. The patient will be seen and further examined/worked up in the main Emergency Department and their care will be completed by the main ED team along with a thorough physical exam. The receiving Emergency Department team will follow up on labs, analgesia, any clinical imaging, and perform reassessment and disposition of the patient as clinically indicated. All decisions regarding the progression of care will be made at their discretion. The patient was counseled on the limited nature of this encounter and that their care will continue in the main Emergency Department.

## 2025-06-02 NOTE — H&P ADULT - HISTORY OF PRESENT ILLNESS
80-year-old f      h/o   breast Ca s/p  RT,    HTN,  remote hx PUD, remote hx SBO s/p SBR 1987,,  c/c  idiopathic small bowel pseudoobstruction         c/o   black stools x3 days ,  dizziness       egd,   past Friday 5/30 with her GI  DrPaul Lorenzo  /  [facility] ,  WITH   very narrowed proximal duodenum with debris in esophaguS   had  a CTAP for evaluation of weight loss about 1.5 weeks ago.,  duodenal narrowing with upstream dilation and so then 5/30      was  at [facility] on Friday after her EGD for dizziness/fatigue and received IV fluids after which the dark stools started.      has  mild fatigue and dizziness.  A  denies chest pain, shortness of breath, dizziness, abdominal pain.

## 2025-06-02 NOTE — ED CLERICAL - NS ED CLERK NOTE PRE-ARRIVAL INFORMATION; ADDITIONAL PRE-ARRIVAL INFORMATION
CC/Reason For referral: GI Bleed  Preferred Consultant(if applicable): GI Consult Patel Zepeda  Who admits for you (if needed):  Do you have documents you would like to fax over?  Would you still like to speak to an ED attending? NO

## 2025-06-02 NOTE — CONSULT NOTE ADULT - ASSESSMENT
80-year-old female PMHx breast Ca s/p RTX, GERD, HTN, remote hx PUD, remote hx SBO s/p SBR 1987, chronic idiopathic pseudoobstruction of small bowel (usually on PPI, reglan, pepcid, miralax) who presents to ED for a chief complaint of black stools x3 days with incr BM frequency and stools softer.  Patient states she had EGD this past Friday 5/30 with her GI (Dr. Roscoe Lorenzo) and had a very narrowed proximal duodenum with debris in esophagus/stomach that the endoscope could not pass.    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 Patient with baseline abdominal distension without pain. Noted with unintentional weight loss for about 1 month noted with concern for duodenal narrowing done on an outpatient CTAP 1.5 weeks ago and again with inability to pass endoscope past duodenal bulb with upstream dilation divya on EGD completed OP 5/30 w Dr. Roscoe Nova. Differential for dark stools could be PUD vs iatrogenic vs duodenal malig vs AVM vs MWT vs dieulafoy vs naye's lesions vs low suspicion for LGIB.    Recommendations:  -rec clears for now, but low threshold to stop and place NGT for decompression if develops nausea and/or vomiting  -obtain outside CTAP record from 1.5 weeks ago, EGD/colonoscopy reports, and path reports (Muir's biopsies 6mo ago) in past 1 year from Dr. Roscoe Nova  -NPO at midnight for possible EGD tomorrow pending above imaging/scope reports  -will require intubation for procedure given possible duodenal obstruction and abdo distension   -monitor BM's  -trend vitals, CBC, and monitor for clinical signs of bleeding  -maintain active type and screen  -transfusion goal to maintain hemoglobin >/= 7.0 and platelets >/= 50  -avoid NSAIDs  -PPI IV BID acceptable for now  -admit  -Anne as per primary team      All recommendations preliminary until note signed by service attending.    Thank you for involving us in the care of this patient. Please contact should any concern or questions arise.    Jonathon Ashton MD   Gastroenterology/Hepatology Fellow PGY-6  Available on [employer] Teams 7am - 5pm  [id number]

## 2025-06-02 NOTE — ED PROVIDER NOTE - ATTENDING CONTRIBUTION TO CARE
------------ATTENDING NOTE------------  pt w/ daughter c/o continued abdominal bloating, occasional mild crampy abdominal pain, sent to ED for concerns of worsening anemia, pt describing melena -- overall well appearing, HD stable, on PPI, no hematochezia / large red blood, no vomiting -- awaiting cw GI, close reassessments, will start w/ IV Fe -->  - Augie Gloria MD   ------------------------------------------------------

## 2025-06-02 NOTE — ED ADULT NURSE NOTE - OBJECTIVE STATEMENT
81 y/o F w/ PMH of GERD, HTN, chronic idiopathic pseudoobstruction of intestine presents to ED complaining of abnormal lab result. Pt reports she has been having black stools for the past 3 days. Pt had an endoscopy friday and reports they could not pass the tube. Since then, has been having the black stools. Pt spoke with GI and was sent to ED for evaluation. Upon arrival, pt is A&OX4, satting well on RA. Afebrile orally. Well appearing. Pt just feels "tired". Abdomen is soft, distended. Non tender. Denies headache, dizziness, vision changes, chest pain, shortness of breath, abdominal pain, nausea, vomiting, diarrhea, fevers, chills, dysuria, hematuria, recent illness travel or fall.

## 2025-06-02 NOTE — ED PROVIDER NOTE - PHYSICAL EXAMINATION
Constitutional: Well-appearing, well-nourished, comfortable appearing.   Head: Normocephalic, atraumatic.   Eyes: PERRL. EOMI. No conjunctival pallor.   ENT: Moist mucous membranes. Uvula midline. No pharyngeal erythema or exudates.  Neck: No LAD. Supple.  CVS: Regular rate, regular rhythm. Normal S1, S2. No murmurs, rubs, or gallops. No peripheral edema noted.   Respiratory: No respiratory distress. Clear to auscultation bilaterally. No wheezing, rales, or rhonchi.   Abdomen: Abd is soft and distended. No tenderness. No rebound, guarding, or rigidity.   MSK: No CVA tenderness bilaterally.   Neuro: Alert and oriented to person, place, and time. Follows commands. Moves all extremities.   Skin: Warm and dry. No rashes.   Psych: Normal affect, cooperative.

## 2025-06-02 NOTE — PATIENT PROFILE ADULT - FALL HARM RISK - FACTORS
IV and/or equipment tethered to patient/Weakness/Other Impaired gait/IV and/or equipment tethered to patient/Weakness/Other

## 2025-06-02 NOTE — ED PROVIDER NOTE - CLINICAL SUMMARY MEDICAL DECISION MAKING FREE TEXT BOX
see MD Note Fellow MD Reese Allen: 80-year-old female PMHx GERD, HTN, chronic idiopathic pseudoobstruction of intestine presents ED complaining of black stools since 3 nights ago.  Patient states she had endoscopy this past Friday with her GI (Dr. Roscoe Lorenzo) and states she was told "area between stomach and esophagus was very narrow, could not pass tube".  Additionally of note she had been fasting prior to endoscopy, morning of procedure did have 2 episodes of witnessed syncope without head trauma, did endorse preceding lightheadedness prior to both episodes.  Feeling well since endoscopy until today, noted black stools with her bowel movement which was new, called GI was advised to come to ED.  Does endorse some mild fatigue.  At this time denies chest pain, shortness of breath, dizziness, abdominal pain.    In the ED, the patient is awake,alert, and oriented x3. She is well appearing and in no acute distress. She is normotensive, HR in the 90s, afebrile, and satting 99% on room air. Abd is distended but non tender. Lungs are CTAB. No conjunctival pallor. Moist mucous membranes. H/H 8.4/27.2. Receiving IV iron, pantoprazole. GI is Dr. Lorenzo.

## 2025-06-02 NOTE — H&P ADULT - NSHPREVIEWOFSYSTEMS_GEN_ALL_CORE
REVIEW OF SYSTEMS:  CONSTITUTIONAL: No fever,  no  weight loss  ENT:  No  tinnitus,   no   vertigo  NECK: No pain or stiffness  RESPIRATORY: No cough, wheezing, chills or hemoptysis;    No Shortness of Breath  CARDIOVASCULAR: No chest pain, palpitations, dizziness  GASTROINTESTINAL: No abdominal or epigastric pain. No nausea, vomiting, or hematemesis; No diarrhea  + melena    GENITOURINARY: No dysuria, frequency, hematuria, or incontinence  NEUROLOGICAL: No headaches  SKIN: No itching,  no   rash  LYMPH Nodes: No enlarged glands  ENDOCRINE: No heat or cold intolerance  MUSCULOSKELETAL: No joint pain or swelling  PSYCHIATRIC: No depression, anxiety  HEME/LYMPH: No easy bruising, or bleeding gums  ALLERGY AND IMMUNOLOGIC: No hives or eczema

## 2025-06-02 NOTE — PATIENT PROFILE ADULT - FALL HARM RISK - HARM RISK INTERVENTIONS

## 2025-06-02 NOTE — H&P ADULT - NSHPLABSRESULTS_GEN_ALL_CORE
LABS:                        8.4    4.34  )-----------( 197      ( 02 Jun 2025 14:40 )             27.2     06-02    138  |  104  |  16  ----------------------------<  114[H]  4.8   |  21[L]  |  0.88    Ca    8.3[L]      02 Jun 2025 14:40    TPro  6.2  /  Alb  3.9  /  TBili  0.6  /  DBili  x   /  AST  25  /  ALT  27  /  AlkPhos  59  06-02    PT/INR - ( 02 Jun 2025 14:40 )   PT: 12.9 sec;   INR: 1.13 ratio         PTT - ( 02 Jun 2025 14:40 )  PTT:37.2 sec      Urinalysis Basic - ( 02 Jun 2025 14:40 )    Color: x / Appearance: x / SG: x / pH: x  Gluc: 114 mg/dL / Ketone: x  / Bili: x / Urobili: x   Blood: x / Protein: x / Nitrite: x   Leuk Esterase: x / RBC: x / WBC x   Sq Epi: x / Non Sq Epi: x / Bacteria: x          06-02 @ 14:34  5.4  19

## 2025-06-02 NOTE — H&P ADULT - ASSESSMENT
80-year-old f      h/o   breast Ca s/p  RT,    HTN,  remote hx PUD, remote hx SBO s/p SBR 1987,,  c/c  idiopathic small bowel pseudoobstruction         c/o   black stools x3 days ,  dizziness       egd,   past Friday 5/30 with her GI  Dr. Roscoe Lorenzo  /  [facility] , with  very narrowed proximal duodenum with debris in esophagus   had  a CTAP for evaluation of weight loss about 1.5 weeks ago.,  duodenal narrowing with upstream dilation and so then 5/30      was  at [facility] on Friday after her EGD for dizziness/fatigue and received IV fluids after which the dark stools started.      has  mild fatigue and dizziness.    denies chest pain, shortness of breath, dizziness, abdominal pain.     melena/  dizziness   hb 8/  [facility]/ [place]  gi eval    ct  a/p. pending   orthostatics    prior ct,  c/c pseudo obstruction, small intestine    HTN     dvt  ppx    keep  npo,  ct  a/p, still  pending/  iv  fkuids    [place]  gi/  egd  in am        <

## 2025-06-03 LAB
ANION GAP SERPL CALC-SCNC: 14 MMOL/L — SIGNIFICANT CHANGE UP (ref 5–17)
BUN SERPL-MCNC: 13 MG/DL — SIGNIFICANT CHANGE UP (ref 7–23)
CALCIUM SERPL-MCNC: 8 MG/DL — LOW (ref 8.4–10.5)
CHLORIDE SERPL-SCNC: 109 MMOL/L — HIGH (ref 96–108)
CO2 SERPL-SCNC: 20 MMOL/L — LOW (ref 22–31)
CREAT SERPL-MCNC: 0.74 MG/DL — SIGNIFICANT CHANGE UP (ref 0.5–1.3)
EGFR: 81 ML/MIN/1.73M2 — SIGNIFICANT CHANGE UP
EGFR: 81 ML/MIN/1.73M2 — SIGNIFICANT CHANGE UP
GLUCOSE SERPL-MCNC: 105 MG/DL — HIGH (ref 70–99)
HCT VFR BLD CALC: 30.8 % — LOW (ref 34.5–45)
HGB BLD-MCNC: 9.8 G/DL — LOW (ref 11.5–15.5)
MCHC RBC-ENTMCNC: 28 PG — SIGNIFICANT CHANGE UP (ref 27–34)
MCHC RBC-ENTMCNC: 31.8 G/DL — LOW (ref 32–36)
MCV RBC AUTO: 88 FL — SIGNIFICANT CHANGE UP (ref 80–100)
NRBC BLD AUTO-RTO: 0 /100 WBCS — SIGNIFICANT CHANGE UP (ref 0–0)
PLATELET # BLD AUTO: 206 K/UL — SIGNIFICANT CHANGE UP (ref 150–400)
POTASSIUM SERPL-MCNC: 4.2 MMOL/L — SIGNIFICANT CHANGE UP (ref 3.5–5.3)
POTASSIUM SERPL-SCNC: 4.2 MMOL/L — SIGNIFICANT CHANGE UP (ref 3.5–5.3)
RBC # BLD: 3.5 M/UL — LOW (ref 3.8–5.2)
RBC # FLD: 15.9 % — HIGH (ref 10.3–14.5)
SODIUM SERPL-SCNC: 143 MMOL/L — SIGNIFICANT CHANGE UP (ref 135–145)
WBC # BLD: 4.17 K/UL — SIGNIFICANT CHANGE UP (ref 3.8–10.5)
WBC # FLD AUTO: 4.17 K/UL — SIGNIFICANT CHANGE UP (ref 3.8–10.5)

## 2025-06-03 PROCEDURE — 99233 SBSQ HOSP IP/OBS HIGH 50: CPT | Mod: GC

## 2025-06-03 PROCEDURE — 74177 CT ABD & PELVIS W/CONTRAST: CPT | Mod: 26

## 2025-06-03 RX ORDER — MISOPROSTOL 200 UG/1
1 TABLET ORAL
Refills: 0 | DISCHARGE

## 2025-06-03 RX ADMIN — Medication 3 MILLIGRAM(S): at 22:09

## 2025-06-03 RX ADMIN — Medication 40 MILLIGRAM(S): at 11:30

## 2025-06-03 NOTE — PHYSICAL THERAPY INITIAL EVALUATION ADULT - ADDITIONAL COMMENTS
Pt lives alone in a private house with 13 steps to enter with left hand rail, (+) chair lift. Pt was completely independent with all functional mobility and ADLs with no assistive device. (+) grab bars in shower. Pt owns no DME besides chair lift.

## 2025-06-03 NOTE — PROGRESS NOTE ADULT - SUBJECTIVE AND OBJECTIVE BOX
Patient is a 80y Female     Patient is a 80y old  Female who presents with a chief complaint of dizziness/  melena (02 Jun 2025 20:10)      HPI:  80-year-old f      h/o   breast Ca s/p  RT,    HTN,  remote hx PUD, remote hx SBO s/p SBR 1987,,  c/c  idiopathic small bowel pseudoobstruction         c/o   black stools x3 days ,  dizziness       egd,   past Friday 5/30 with her GI  DrPaul Lorenzo  /  [facility] ,  WITH   very narrowed proximal duodenum with debris in esophaguS   had  a CTAP for evaluation of weight loss about 1.5 weeks ago.,  duodenal narrowing with upstream dilation and so then 5/30      was  at [facility] on Friday after her EGD for dizziness/fatigue and received IV fluids after which the dark stools started.      has  mild fatigue and dizziness.  A  denies chest pain, shortness of breath, dizziness, abdominal pain.       (02 Jun 2025 20:10)      PAST MEDICAL & SURGICAL HISTORY:  Chronic idiopathic pseudo-obstruction of intestine      Breast cancer      HLD (hyperlipidemia)      GERD (gastroesophageal reflux disease)      Osteoporosis      S/P small bowel resection          MEDICATIONS  (STANDING):  atorvastatin 10 milliGRAM(s) Oral at bedtime  dextrose 5% + sodium chloride 0.9%. 1000 milliLiter(s) (70 mL/Hr) IV Continuous <Continuous>  pantoprazole  Injectable 40 milliGRAM(s) IV Push daily      Allergies    penicillins (Unknown)  sulfa drugs (Hives)    Intolerances        SOCIAL HISTORY:  Denies ETOh,Smoking,     FAMILY HISTORY:      REVIEW OF SYSTEMS:    CONSTITUTIONAL: No weakness, fevers or chills  EYES/ENT: No visual changes;  No vertigo or throat pain   NECK: No pain or stiffness  RESPIRATORY: No cough, wheezing, hemoptysis; No shortness of breath  CARDIOVASCULAR: No chest pain or palpitations  GASTROINTESTINAL: No abdominal or epigastric pain. No nausea, vomiting, or hematemesis; No diarrhea or constipation. No melena or hematochezia.  GENITOURINARY: No dysuria, frequency or hematuria  NEUROLOGICAL: No numbness or weakness  SKIN: No itching, burning, rashes, or lesions   All other review of systems is negative unless indicated above.    VITAL:  T(C): , Max: 36.9 (06-02-25 @ 18:30)  T(F): , Max: 98.4 (06-02-25 @ 18:30)  HR: 68 (06-03-25 @ 04:42)  BP: 109/63 (06-03-25 @ 04:42)  BP(mean): 96 (06-02-25 @ 19:21)  RR: 18 (06-03-25 @ 04:42)  SpO2: 96% (06-03-25 @ 04:42)  Wt(kg): --    I and O's:    Height (cm): 157.5 (06-02 @ 13:14)  Weight (kg): 56.7 (06-02 @ 13:14)  BMI (kg/m2): 22.9 (06-02 @ 13:14)  BSA (m2): 1.57 (06-02 @ 13:14)    PHYSICAL EXAM:    Constitutional: NAD  HEENT: PERRLA,   Neck: No JVD  Respiratory: CTA B/L  Cardiovascular: S1 and S2  Gastrointestinal: BS+, soft, NT/ND  Extremities: No peripheral edema  Neurological: A/O x 3, no focal deficits  Psychiatric: Normal mood, normal affect  : No Acevedo  Skin: No rashes  Access: Not applicable  Back: No CVA tenderness    LABS:                        8.4    4.34  )-----------( 197      ( 02 Jun 2025 14:40 )             27.2     06-02    138  |  104  |  16  ----------------------------<  114[H]  4.8   |  21[L]  |  0.88    Ca    8.3[L]      02 Jun 2025 14:40    TPro  6.2  /  Alb  3.9  /  TBili  0.6  /  DBili  x   /  AST  25  /  ALT  27  /  AlkPhos  59  06-02          RADIOLOGY & ADDITIONAL STUDIES:

## 2025-06-03 NOTE — PHYSICAL THERAPY INITIAL EVALUATION ADULT - PERTINENT HX OF CURRENT PROBLEM, REHAB EVAL
80-year-old f h/o   breast Ca s/p  RT,    HTN,  remote hx PUD, remote hx SBO s/p SBR 1987,,  c/c  idiopathic small bowel pseudoobstruction c/o   black stools x3 days ,  dizziness egd,   past Friday 5/30 with her GI  DrPaul Lorenzo  /  [facility] ,  WITH   very narrowed proximal duodenum with debris in esophagus had  a CTAP for evaluation of weight loss about 1.5 weeks ago.,  duodenal narrowing with upstream dilation and so then 5/30. was  at [facility] on Friday after her EGD for dizziness/fatigue and received IV fluids after which the dark stools started.  has  mild fatigue and dizziness.  A  denies chest pain, shortness of breath, dizziness, abdominal pain. Hospital stay: 6/2 CXR: No focal consolidation. Left left sternoclavicular deformity which was likely present on a prior CT in retrospect from 4/13/2023

## 2025-06-03 NOTE — CHART NOTE - NSCHARTNOTEFT_GEN_A_CORE
Advanced Care Planning:  I have had goals of care discussion, advanced directive and code status with patient   and  in  coordinating   patient care.     all questions answered and expressed understanding of the plan.     pt  si  full code

## 2025-06-03 NOTE — PHARMACOTHERAPY INTERVENTION NOTE - COMMENTS
Performed medication reconciliation and home medication list updated in prescription writer/ outpatient medication review. Medications verified with patient and pharmacy.     Home medications:  ergocalciferol 1.25 mg (50,000 intl units) oral tablet: 1 tab(s) orally 2 times a week sunday and wednesday  methenamine hippurate 1 g oral tablet: 1 tab(s) orally once a day  MiraLax oral powder for reconstitution: 17 gram(s) orally once a day  miSOPROStol 200 mcg oral tablet: 1 tab(s) orally once a day  pantoprazole 40 mg oral delayed release tablet: 1 tab(s) orally once a day  Prolia 60 mg/mL subcutaneous solution: 60 milligram(s) subcutaneously every 6 months  Reglan 10 mg oral tablet: 2 tab(s) orally 2 times a day  simvastatin 10 mg oral tablet: 1 tab(s) orally once a day (at bedtime)    Sang Hernández PharmD  Transition of Care Pharmacist  Available on [employer] Teams (preferred)

## 2025-06-03 NOTE — PROGRESS NOTE ADULT - ASSESSMENT
80-year-old f      h/o   breast Ca s/p  RT,    HTN,  remote hx PUD, remote hx SBO s/p SBR 1987,,  c/c  idiopathic small bowel pseudoobstruction         c/o   black stools x3 days ,  dizziness       egd,   past Friday 5/30 with her GI  Dr. Roscoe Lorenzo  /  [facility] , with  very narrowed proximal duodenum with debris in esophagus   had  a CTAP for evaluation of weight loss about 1.5 weeks ago.,  duodenal narrowing with upstream dilation and so then 5/30      was  at [facility] on Friday after her EGD for dizziness/fatigue and received IV fluids after which the dark stools started.      has  mild fatigue and dizziness.    denies chest pain, shortness of breath, dizziness, abdominal pain.     melena/  dizziness   hb 8/  prbc/.  seen by [facility] on arrival    ct  a/p. pending   orthostatics    prior  ct,  c/c pseudo obstruction, small intestine    HTN     dvt  ppx    keep  npo,    iv  fkuids     per   house  gi/  egd  /   noted  note  by  matt hardin/  awaiting   call   back     await  egd /  hb is  9/30.  s/p  prbc     card matt peacock        <            80-year-old f      h/o   breast Ca s/p  RT,    HTN,  remote hx PUD, remote hx SBO s/p SBR 1987,,  c/c  idiopathic small bowel pseudoobstruction         c/o   black stools x3 days ,  dizziness       egd,   past Friday 5/30 with her GI  Dr. Roscoe Lorenzo  /  [facility] , with  very narrowed proximal duodenum with debris in esophagus   had  a CTAP for evaluation of weight loss about 1.5 weeks ago.,  duodenal narrowing with upstream dilation and so then 5/30      was  at [facility] on Friday after her EGD for dizziness/fatigue and received IV fluids after which the dark stools started.      has  mild fatigue and dizziness.    denies chest pain, shortness of breath, dizziness, abdominal pain.     melena/  dizziness   hb 8/  prbc/.  seen by [facility] gi on arrival    ct  a/p. pending   orthostatics    prior  ct,  c/c pseudo obstruction, small intestine    HTN     dvt  ppx    keep  npo,    iv  fkuids/  ct a/p     per   house  gi/  egd  /   noted  note  by  matt hardin/   rubytted  to  expedite   egd     await  egd /  hb is  9/30.  s/p  prbc     card matt peacock        <

## 2025-06-03 NOTE — PROGRESS NOTE ADULT - ASSESSMENT
80-year-old female PMHx breast Ca s/p RTX, GERD, HTN, remote hx PUD, remote hx SBO s/p SBR 1987, chronic idiopathic pseudoobstruction of small bowel (usually on PPI, reglan, pepcid, miralax) who presents to ED for a chief complaint of black stools x3 days with incr BM frequency and stools softer.  Patient states she had EGD this past Friday 5/30 with her GI (Dr. Roscoe Lorenzo) and had a very narrowed proximal duodenum with debris in esophagus/stomach that the endoscope could not pass.    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 Patient with baseline abdominal distension without pain. Noted with unintentional weight loss for about 1 month noted with concern for duodenal narrowing done on an outpatient CTAP 1.5 weeks ago and again with inability to pass endoscope past duodenal bulb with upstream dilation divya on EGD completed OP 5/30 w Dr. Roscoe Nova. Differential for dark stools could be PUD vs iatrogenic vs duodenal malig vs AVM vs MWT vs dieulafoy vs naye's lesions vs low suspicion for LGIB.    Recommendations:  -rec clears for now, but low threshold to stop and place NGT for decompression if develops nausea and/or vomiting  -NPO at midnight for possible EGD tomorrow (6/4)   -will require intubation for procedure given possible duodenal obstruction and abdo distension   -consider vascular evaluation of abnormal imaging findings on CTAP, unclear if contributing to current presentation  -monitor BM's  -trend vitals, CBC, and monitor for clinical signs of bleeding  -maintain active type and screen  -transfusion goal to maintain hemoglobin >/= 7.0 and platelets >/= 50  -avoid NSAIDs  -PPI IV BID acceptable for now  -admit  -Anne as per primary team      All recommendations preliminary until note signed by service attending.    Thank you for involving us in the care of this patient. Please contact should any concern or questions arise.    Jonathon Ashton MD   Gastroenterology/Hepatology Fellow PGY-6  Available on [employer] Teams 7am - 5pm  [id number]

## 2025-06-03 NOTE — PROGRESS NOTE ADULT - SUBJECTIVE AND OBJECTIVE BOX
date of service: 06-03-25 @ 08:49  [facility]  REVIEW OF SYSTEMS:  CONSTITUTIONAL: No fever,  no  weight loss  ENT:  No  tinnitus,   no   vertigo  NECK: No pain or stiffness  RESPIRATORY: No cough, wheezing, chills or hemoptysis;    No Shortness of Breath  CARDIOVASCULAR: No chest pain, palpitations, dizziness  GASTROINTESTINAL: No abdominal or epigastric pain. No nausea, vomiting, or hematemesis; No diarrhea  No melena or hematochezia.  GENITOURINARY: No dysuria, frequency, hematuria, or incontinence  NEUROLOGICAL: No headaches  SKIN: No itching,  no   rash  LYMPH Nodes: No enlarged glands  ENDOCRINE: No heat or cold intolerance  MUSCULOSKELETAL: No joint pain or swelling  PSYCHIATRIC: No depression, anxiety  HEME/LYMPH: No easy bruising, or bleeding gums  ALLERGY AND IMMUNOLOGIC: No hives or eczema	    MEDICATIONS  (STANDING):  atorvastatin 10 milliGRAM(s) Oral at bedtime  dextrose 5% + sodium chloride 0.9%. 1000 milliLiter(s) (70 mL/Hr) IV Continuous <Continuous>  pantoprazole  Injectable 40 milliGRAM(s) IV Push daily    MEDICATIONS  (PRN):  melatonin 3 milliGRAM(s) Oral at bedtime PRN Insomnia  ondansetron Injectable 4 milliGRAM(s) IV Push three times a day PRN Nausea and/or Vomiting      Vital Signs Last 24 Hrs  T(C): 36.4 (03 Jun 2025 08:37), Max: 36.9 (02 Jun 2025 18:30)  T(F): 97.6 (03 Jun 2025 08:37), Max: 98.4 (02 Jun 2025 18:30)  HR: 75 (03 Jun 2025 08:37) (68 - 94)  BP: 125/66 (03 Jun 2025 08:37) (102/60 - 173/70)  BP(mean): 96 (02 Jun 2025 19:21) (96 - 96)  RR: 18 (03 Jun 2025 08:37) (15 - 20)  SpO2: 95% (03 Jun 2025 08:37) (93% - 99%)    Parameters below as of 03 Jun 2025 08:37  Patient On (Oxygen Delivery Method): room air      CAPILLARY BLOOD GLUCOSE        I&O's Summary    03 Jun 2025 07:01  -  03 Jun 2025 08:49  --------------------------------------------------------  IN: 0 mL / OUT: 1 mL / NET: -1 mL          Appearance: Normal	  HEENT:   Normal oral mucosa, PERRL, EOMI	  Lymphatic: No lymphadenopathy  Cardiovascular: Normal S1 S2, No JVD  Respiratory: Lungs clear to auscultation	  Gastrointestinal:  Soft, Non-tender, + BS	  Skin: No rash, No ecchymoses	  Extremities:     LABS:                        9.8    4.17  )-----------( 206      ( 03 Jun 2025 07:28 )             30.8     06-03    143  |  109[H]  |  13  ----------------------------<  105[H]  4.2   |  20[L]  |  0.74    Ca    8.0[L]      03 Jun 2025 07:28    TPro  6.2  /  Alb  3.9  /  TBili  0.6  /  DBili  x   /  AST  25  /  ALT  27  /  AlkPhos  59  06-02    PT/INR - ( 02 Jun 2025 14:40 )   PT: 12.9 sec;   INR: 1.13 ratio         PTT - ( 02 Jun 2025 14:40 )  PTT:37.2 sec      Urinalysis Basic - ( 03 Jun 2025 07:28 )    Color: x / Appearance: x / SG: x / pH: x  Gluc: 105 mg/dL / Ketone: x  / Bili: x / Urobili: x   Blood: x / Protein: x / Nitrite: x   Leuk Esterase: x / RBC: x / WBC x   Sq Epi: x / Non Sq Epi: x / Bacteria: x          06-02 @ 14:34  5.4  19              Consultant(s) Notes Reviewed:      Care Discussed with Consultants/Other Providers:

## 2025-06-03 NOTE — PROGRESS NOTE ADULT - SUBJECTIVE AND OBJECTIVE BOX
Interval Events:   -NAEON  -CTAP today, details below  -patient prefers House GI continue to follow    Hospital Medications:  atorvastatin 10 milliGRAM(s) Oral at bedtime  dextrose 5% + sodium chloride 0.9%. 1000 milliLiter(s) IV Continuous <Continuous>  melatonin 3 milliGRAM(s) Oral at bedtime PRN  ondansetron Injectable 4 milliGRAM(s) IV Push three times a day PRN  pantoprazole  Injectable 40 milliGRAM(s) IV Push daily      PHYSICAL EXAM:   Vital Signs:  Vital Signs Last 24 Hrs  T(C): 36.5 (03 Jun 2025 20:04), Max: 36.6 (02 Jun 2025 22:27)  T(F): 97.7 (03 Jun 2025 20:04), Max: 97.9 (02 Jun 2025 22:27)  HR: 81 (03 Jun 2025 20:04) (68 - 84)  BP: 128/65 (03 Jun 2025 20:04) (102/60 - 141/77)  BP(mean): --  RR: 18 (03 Jun 2025 20:04) (18 - 18)  SpO2: 95% (03 Jun 2025 20:04) (93% - 98%)    Parameters below as of 03 Jun 2025 20:04  Patient On (Oxygen Delivery Method): room air      Daily     Daily     GENERAL:  No acute distress  HEENT:  Normocephalic/atraumatic  CHEST:  No accessory muscle use  HEART:  Regular rate and rhythm  ABDOMEN:  Soft, non-tender, +distended  NAN with Chaperone 6/2 - Scant black stool visualized. No red blood.  SKIN:  No rash  NEURO:  Alert and oriented x 3                          9.8    4.17  )-----------( 206      ( 03 Jun 2025 07:28 )             30.8     06-03    143  |  109[H]  |  13  ----------------------------<  105[H]  4.2   |  20[L]  |  0.74    Ca    8.0[L]      03 Jun 2025 07:28    TPro  6.2  /  Alb  3.9  /  TBili  0.6  /  DBili  x   /  AST  25  /  ALT  27  /  AlkPhos  59  06-02    LIVER FUNCTIONS - ( 02 Jun 2025 14:40 )  Alb: 3.9 g/dL / Pro: 6.2 g/dL / ALK PHOS: 59 U/L / ALT: 27 U/L / AST: 25 U/L / GGT: x             Interval Diagnostic Studies:   < from: CT Abdomen and Pelvis w/ IV Cont (06.03.25 @ 11:59) >  FINDINGS:  LOWER CHEST: Small sliding-type hiatal hernia with a patulous esophagus   again noted containing fluid and gas. Interval resolution of the prior   trace pleural effusions.    LIVER: There are several scattered benign appearing hepatic cysts   measuring up to 6.0 cm within segment IVb (3-42) and rim calcifications   within a segment 2 cyst near the dome (3-18). Probable mild hepatic   steatosis.  BILE DUCTS: Normal caliber.  GALLBLADDER: Within normal limits.  SPLEEN: Within normal limits.  PANCREAS: Within normal limits.  ADRENALS: Within normal limits.  KIDNEYS/URETERS: Duplicated bilateral renal collecting systems. No   urinary tract calculi or hydronephrosis identified. Small simple left   renalcyst.    BLADDER: Moderately distended bladder.  REPRODUCTIVE ORGANS: Status post hysterectomy.    BOWEL: Markedly dilated/patulous duodenum again noted with focal   narrowing as it courses anterior to the central varices/shunt between the   inferiormesenteric vein and IVC (3-68). Appendix is normal. The distal   ileum is markedly distended measuring up to 9.6 cm in diameter and   contains gas and liquid stool. There is also low-attenuation stool noted   throughout the entirety of the colon. There is diverticulosis of the left   knee: Without surrounding inflammatory changes.  PERITONEUM/RETROPERITONEUM: Within normal limits.  VESSELS: Atherosclerotic changes. No aortic aneurysm. There is a shunt   between the left inferior mesenteric vein and IVC.  LYMPH NODES: No lymphadenopathy.  ABDOMINAL WALL: Within normal limits.  BONES: There is a T11 vertebral body hemangioma. Mild degenerative   changes of the spine and hip joints.    IMPRESSION:  1. Persistent markedly dilated/patulous proximal duodenum, esophagus, and   distal ileum, similar to prior study suggestive of an underlying   functional etiology. No evidence of obstruction.  2. Low-attenuation stool is noted throughout the colon, which can be seen   with steatorrhea. Clinical correlation is suggested.  3. Incidental shunt between the inferior mesenteric vein and IVC. The   duodenal narrowing occurs as it drapes along the anterior aspect of the   shunt.    < end of copied text >

## 2025-06-03 NOTE — PROGRESS NOTE ADULT - ASSESSMENT
covering for sharmin  no acute issues  follow hgb  egd firday ? strciture food  on reglan at home,r ecommend 5 mg iv every 6 hourrs  ppi  possible egd tomrorow. covering for sharmin  no acute issues  follow hgb  egd firday ? strciture food  on reglan at home,r ecommend 5 mg iv every 6 hourrs  ppi  possible egd tomrorow.    pt needs ct scan (ordered in er to rule out obstruction  I agree with this recommendation, need to be done with oral contrast before egd is performed.  pt reports hospitalization last year for obscure gib    pt satates hx of [facility], she was cared for by dr. lawson before he moved to [place]  pt now under the care of dr. paz carreon at [facility]  d/w dr. finney yesterday who recomended considerat of 24 hour ngt decompression  will make further recommendation after ct, no overt obstruction on exam, but egd concerning  and risk for aspiration/ low yeild of egd with full stomach explained to pt.

## 2025-06-03 NOTE — PHYSICAL THERAPY INITIAL EVALUATION ADULT - ACTIVE RANGE OF MOTION EXAMINATION, REHAB EVAL
shashi. upper extremity Active ROM was WNL (within normal limits)/bilateral lower extremity Active ROM was WNL (within normal limits)

## 2025-06-03 NOTE — CHART NOTE - NSCHARTNOTEFT_GEN_A_CORE
Brief GI Note:    Patient to be followed by private GI group under Dr. Cortez and Dr. Zepeda going forward. House GI team to sign off, please follow private recs and contact aforementioned providers for GI recommendations.      Jonathon Ashton MD  PGY-6 GI/Hepatology  TEAMS

## 2025-06-04 LAB
HCT VFR BLD CALC: 28.3 % — LOW (ref 34.5–45)
HCT VFR BLD CALC: 30.5 % — LOW (ref 34.5–45)
HGB BLD-MCNC: 8.9 G/DL — LOW (ref 11.5–15.5)
HGB BLD-MCNC: 9.7 G/DL — LOW (ref 11.5–15.5)
MCHC RBC-ENTMCNC: 27.6 PG — SIGNIFICANT CHANGE UP (ref 27–34)
MCHC RBC-ENTMCNC: 28 PG — SIGNIFICANT CHANGE UP (ref 27–34)
MCHC RBC-ENTMCNC: 31.4 G/DL — LOW (ref 32–36)
MCHC RBC-ENTMCNC: 31.8 G/DL — LOW (ref 32–36)
MCV RBC AUTO: 87.6 FL — SIGNIFICANT CHANGE UP (ref 80–100)
MCV RBC AUTO: 88.2 FL — SIGNIFICANT CHANGE UP (ref 80–100)
NRBC BLD AUTO-RTO: 0 /100 WBCS — SIGNIFICANT CHANGE UP (ref 0–0)
NRBC BLD AUTO-RTO: 0 /100 WBCS — SIGNIFICANT CHANGE UP (ref 0–0)
PLATELET # BLD AUTO: 191 K/UL — SIGNIFICANT CHANGE UP (ref 150–400)
PLATELET # BLD AUTO: 194 K/UL — SIGNIFICANT CHANGE UP (ref 150–400)
RBC # BLD: 3.23 M/UL — LOW (ref 3.8–5.2)
RBC # BLD: 3.46 M/UL — LOW (ref 3.8–5.2)
RBC # FLD: 16.2 % — HIGH (ref 10.3–14.5)
RBC # FLD: 16.3 % — HIGH (ref 10.3–14.5)
WBC # BLD: 3.15 K/UL — LOW (ref 3.8–10.5)
WBC # BLD: 4.12 K/UL — SIGNIFICANT CHANGE UP (ref 3.8–10.5)
WBC # FLD AUTO: 3.15 K/UL — LOW (ref 3.8–10.5)
WBC # FLD AUTO: 4.12 K/UL — SIGNIFICANT CHANGE UP (ref 3.8–10.5)

## 2025-06-04 PROCEDURE — 44360 SMALL BOWEL ENDOSCOPY: CPT | Mod: GC

## 2025-06-04 PROCEDURE — 99223 1ST HOSP IP/OBS HIGH 75: CPT

## 2025-06-04 DEVICE — RETRIEVAL FOOD BOLUS ROTHNET: Type: IMPLANTABLE DEVICE | Status: FUNCTIONAL

## 2025-06-04 RX ADMIN — SODIUM CHLORIDE 70 MILLILITER(S): 9 INJECTION, SOLUTION INTRAVENOUS at 18:02

## 2025-06-04 RX ADMIN — Medication 40 MILLIGRAM(S): at 17:35

## 2025-06-04 NOTE — CHART NOTE - NSCHARTNOTEFT_GEN_A_CORE
Vascular surgery consult requested by Dr. Yo was called (CT a/p results) awaiting recommendations.     Susan Robertson NP, #[id number]

## 2025-06-04 NOTE — PROGRESS NOTE ADULT - SUBJECTIVE AND OBJECTIVE BOX
date of service: 06-04-25 @ 09:18  [facility]  REVIEW OF SYSTEMS:  CONSTITUTIONAL: No fever,  no  weight loss  ENT:  No  tinnitus,   no   vertigo  NECK: No pain or stiffness  RESPIRATORY: No cough, wheezing, chills or hemoptysis;    No Shortness of Breath  CARDIOVASCULAR: No chest pain, palpitations, dizziness  GASTROINTESTINAL: No abdominal or epigastric pain. No nausea, vomiting, or hematemesis; No diarrhea  No melena or hematochezia.  GENITOURINARY: No dysuria, frequency, hematuria, or incontinence  NEUROLOGICAL: No headaches  SKIN: No itching,  no   rash  LYMPH Nodes: No enlarged glands  ENDOCRINE: No heat or cold intolerance  MUSCULOSKELETAL: No joint pain or swelling  PSYCHIATRIC: No depression, anxiety  HEME/LYMPH: No easy bruising, or bleeding gums  ALLERGY AND IMMUNOLOGIC: No hives or eczema	    MEDICATIONS  (STANDING):  atorvastatin 10 milliGRAM(s) Oral at bedtime  dextrose 5% + sodium chloride 0.9%. 1000 milliLiter(s) (70 mL/Hr) IV Continuous <Continuous>  pantoprazole  Injectable 40 milliGRAM(s) IV Push daily    MEDICATIONS  (PRN):  melatonin 3 milliGRAM(s) Oral at bedtime PRN Insomnia  ondansetron Injectable 4 milliGRAM(s) IV Push three times a day PRN Nausea and/or Vomiting      Vital Signs Last 24 Hrs  T(C): 36.7 (04 Jun 2025 08:30), Max: 36.7 (04 Jun 2025 08:30)  T(F): 98 (04 Jun 2025 08:30), Max: 98 (04 Jun 2025 08:30)  HR: 76 (04 Jun 2025 08:30) (76 - 85)  BP: 113/58 (04 Jun 2025 08:30) (113/58 - 141/77)  BP(mean): --  RR: 18 (04 Jun 2025 08:30) (18 - 18)  SpO2: 97% (04 Jun 2025 08:30) (95% - 98%)    Parameters below as of 04 Jun 2025 08:30  Patient On (Oxygen Delivery Method): room air      CAPILLARY BLOOD GLUCOSE        I&O's Summary        Appearance: Normal	  HEENT:   Normal oral mucosa, PERRL, EOMI	  Lymphatic: No lymphadenopathy  Cardiovascular: Normal S1 S2, No JVD  Respiratory: Lungs clear to auscultation	  Gastrointestinal:  Soft, Non-tender, + BS	  Skin: No rash, No ecchymoses	  Extremities:     LABS:                        8.9    3.15  )-----------( 194      ( 04 Jun 2025 07:28 )             28.3     06-03    143  |  109[H]  |  13  ----------------------------<  105[H]  4.2   |  20[L]  |  0.74    Ca    8.0[L]      03 Jun 2025 07:28    TPro  6.2  /  Alb  3.9  /  TBili  0.6  /  DBili  x   /  AST  25  /  ALT  27  /  AlkPhos  59  06-02    PT/INR - ( 02 Jun 2025 14:40 )   PT: 12.9 sec;   INR: 1.13 ratio         PTT - ( 02 Jun 2025 14:40 )  PTT:37.2 sec      Urinalysis Basic - ( 03 Jun 2025 07:28 )    Color: x / Appearance: x / SG: x / pH: x  Gluc: 105 mg/dL / Ketone: x  / Bili: x / Urobili: x   Blood: x / Protein: x / Nitrite: x   Leuk Esterase: x / RBC: x / WBC x   Sq Epi: x / Non Sq Epi: x / Bacteria: x          06-02 @ 14:34  5.4  19              Consultant(s) Notes Reviewed:      Care Discussed with Consultants/Other Providers:

## 2025-06-04 NOTE — CONSULT NOTE ADULT - SUBJECTIVE AND OBJECTIVE BOX
GENERAL SURGERY CONSULT NOTE    HPI:    80-year-old female PMHx breast Ca s/p RTX, GERD, HTN, remote hx PUD, remote hx SBO s/p SBR 1987, chronic idiopathic small bowel pseudoobstruction (usually on PPI, reglan, pepcid, miralax) who presents to ED for a chief complaint of black stools x3 days with incr BM frequency and stools softer.  Patient states she had EGD this past Friday 5/30 with her GI (Dr. Roscoe Lorenzo) and had a very narrowed proximal duodenum with debris in esophagus/stomach that the endoscope could not pass (impression from scope is below). The scope was done because OP GI had gotten a CTAP for evaluation of weight loss about 1.5 weeks ago. The CTAP had shown concern for duodenal narrowing with upstream dilation and so then 5/30 EGD was performed. Allisonn reports she went to ER at [facility] on Friday after her EGD for dizziness/fatigue and received IV fluids after which the dark stools started. Currently Patient reports mild fatigue and dizziness.  At this time denies chest pain, shortness of breath, dizziness, abdominal pain. Patient reports that her BM's are usually light brown and she goes BiD at baseline. She does not take iron at home. Reports abdomen has been distended for many years. She reports before Friday she had an EGD/Colonoscopy 6 months ago with Dr. Lorenzo as well which were grossly normal to her knowledge (no reports available). Denies n/v/f/c/ap. She feels hungry currently. Reports eating a lot on Sunday. Vascular surgery consulted for CT findings of incidental shunt between the inferior mesenteric vein and IVC., with consequent duodenal narrowing as it drapes along the anterior aspect of the shunt.      PMH/PSH: Chronic idiopathic pseudo-obstruction of intestine    Breast cancer    HLD (hyperlipidemia)    GERD (gastroesophageal reflux disease)    Osteoporosis    S/P small bowel resection        MEDS:atorvastatin 10 milliGRAM(s) Oral at bedtime  dextrose 5% + sodium chloride 0.9%. 1000 milliLiter(s) (70 mL/Hr) IV Continuous <Continuous>  pantoprazole  Injectable 40 milliGRAM(s) IV Push daily  melatonin 3 milliGRAM(s) Oral at bedtime PRN Insomnia  ondansetron Injectable 4 milliGRAM(s) IV Push three times a day PRN Nausea and/or Vomiting      ALLERGIES: NKDA    REVIEW OF SYSTEMS: All ROS negative except as per HPI.  ____________________________________________    VITALS:T(C): 36.4, Max: 36.6 (06-03)  T(F): 97.6, Max: 97.9 (06-03)  HR: 77 (75 - 85)  BP: 116/61 (116/61 - 141/77)  BP(mean): --  RR: 18 (18 - 18)  SpO2: 95% (95% - 98%) room air         PHYSICAL EXAM:  General: AAOx3, no acute distress.  Respiratory: breathing comfortably, no increased WOB   Abdomen: soft, nontender, chronically distended   Extremities: Moves all four.   ____________________________________________    LABS:                      9.8  4.17 )-----------( 206    ( 03 Jun 2025 07:28 )             30.8  143  |  109[H]  |  13  ----------------------------<  105[H]    (06-03)  4.2   |  20[L]  |  0.74          Ca    8.0[L]      06-03  Mg    x  Phos  x        LIVER FUNCTIONS - ( 02 Jun 2025 14:40 )  Alb: 3.9 g/dL / Pro: 6.2 g/dL / ALK PHOS: 59 U/L / ALT: 27 U/L / AST: 25 U/L / GGT: x      PT/INR - ( 02 Jun 2025 14:40 )   PT: 12.9 sec;   INR: 1.13 ratio         PTT - ( 02 Jun 2025 14:40 )  PTT:37.2 sec  Urinalysis Basic - ( 03 Jun 2025 07:28 )    Color: x / Appearance: x / SG: x / pH: x  Gluc: 105 mg/dL / Ketone: x  / Bili: x / Urobili: x   Blood: x / Protein: x / Nitrite: x   Leuk Esterase: x / RBC: x / WBC x   Sq Epi: x / Non Sq Epi: x / Bacteria: x      ____________________________________________    RADIOLOGY & ADDITIONAL STUDIES: VASCULAR SURGERY CONSULT NOTE    HPI:    80-year-old female PMHx breast Ca s/p RTX, GERD, HTN, remote hx PUD, remote hx SBO s/p SBR 1987, chronic idiopathic small bowel pseudoobstruction (usually on PPI, reglan, pepcid, miralax) who presents to ED for a chief complaint of black stools x3 days with incr BM frequency and stools softer.  Patient states she had EGD this past Friday 5/30 with her GI (Dr. Roscoe Lorenzo) and had a very narrowed proximal duodenum with debris in esophagus/stomach that the endoscope could not pass (impression from scope is below). The scope was done because OP GI had gotten a CTAP for evaluation of weight loss about 1.5 weeks ago. The CTAP had shown concern for duodenal narrowing with upstream dilation and so then 5/30 EGD was performed. Allisonn reports she went to ER at [facility] on Friday after her EGD for dizziness/fatigue and received IV fluids after which the dark stools started. Currently Patient reports mild fatigue and dizziness.  At this time denies chest pain, shortness of breath, dizziness, abdominal pain. Patient reports that her BM's are usually light brown and she goes BiD at baseline. She does not take iron at home. Reports abdomen has been distended for many years. She reports before Friday she had an EGD/Colonoscopy 6 months ago with Dr. Lorenzo as well which were grossly normal to her knowledge (no reports available). Denies n/v/f/c/ap. She feels hungry currently. Reports eating a lot on Sunday. Vascular surgery consulted for CT findings of incidental shunt between the inferior mesenteric vein and IVC., with consequent duodenal narrowing as it drapes along the anterior aspect of the shunt.      PMH/PSH: Chronic idiopathic pseudo-obstruction of intestine    Breast cancer    HLD (hyperlipidemia)    GERD (gastroesophageal reflux disease)    Osteoporosis    S/P small bowel resection        MEDS:atorvastatin 10 milliGRAM(s) Oral at bedtime  dextrose 5% + sodium chloride 0.9%. 1000 milliLiter(s) (70 mL/Hr) IV Continuous <Continuous>  pantoprazole  Injectable 40 milliGRAM(s) IV Push daily  melatonin 3 milliGRAM(s) Oral at bedtime PRN Insomnia  ondansetron Injectable 4 milliGRAM(s) IV Push three times a day PRN Nausea and/or Vomiting      ALLERGIES: NKDA    REVIEW OF SYSTEMS: All ROS negative except as per HPI.  ____________________________________________    VITALS:T(C): 36.4, Max: 36.6 (06-03)  T(F): 97.6, Max: 97.9 (06-03)  HR: 77 (75 - 85)  BP: 116/61 (116/61 - 141/77)  BP(mean): --  RR: 18 (18 - 18)  SpO2: 95% (95% - 98%) room air         PHYSICAL EXAM:  General: AAOx3, no acute distress.  Respiratory: breathing comfortably, no increased WOB   Abdomen: soft, nontender, chronically distended   Extremities: Moves all four.   ____________________________________________    LABS:                      9.8  4.17 )-----------( 206    ( 03 Jun 2025 07:28 )             30.8  143  |  109[H]  |  13  ----------------------------<  105[H]    (06-03)  4.2   |  20[L]  |  0.74          Ca    8.0[L]      06-03  Mg    x  Phos  x        LIVER FUNCTIONS - ( 02 Jun 2025 14:40 )  Alb: 3.9 g/dL / Pro: 6.2 g/dL / ALK PHOS: 59 U/L / ALT: 27 U/L / AST: 25 U/L / GGT: x      PT/INR - ( 02 Jun 2025 14:40 )   PT: 12.9 sec;   INR: 1.13 ratio         PTT - ( 02 Jun 2025 14:40 )  PTT:37.2 sec  Urinalysis Basic - ( 03 Jun 2025 07:28 )    Color: x / Appearance: x / SG: x / pH: x  Gluc: 105 mg/dL / Ketone: x  / Bili: x / Urobili: x   Blood: x / Protein: x / Nitrite: x   Leuk Esterase: x / RBC: x / WBC x   Sq Epi: x / Non Sq Epi: x / Bacteria: x      ____________________________________________    RADIOLOGY & ADDITIONAL STUDIES:

## 2025-06-04 NOTE — CONSULT NOTE ADULT - SUBJECTIVE AND OBJECTIVE BOX
Date of Service, 06-04-25 @ 09:44  CHIEF COMPLAINT:Patient is a 80y old  Female who presents with a chief complaint of dizziness/  melena (04 Jun 2025 09:18)      HPI:  80-year-old female PMHx GERD, HTN, chronic idiopathic pseudoobstruction of intestine presents ED complaining of black stools since this morning.  Patient states she had endoscopy this past Friday with her GI (Dr. Roscoe Lorenzo) and states she was told "area between stomach and esophagus was very narrow, could not pass tube".  Additionally of note she had been fasting prior to endoscopy, morning of procedure did have 2 episodes of witnessed syncope without head trauma, did endorse preceding lightheadedness prior to both episodes.  Feeling well since endoscopy until today, noted black stools with her bowel movement which was new, called GI was advised to come to ED.  Does endorse some mild fatigue.  At this time denies chest pain, shortness of breath, dizziness, abdominal pain.      PAST MEDICAL & SURGICAL HISTORY:  Chronic idiopathic pseudo-obstruction of intestine  Breast cancer  HLD (hyperlipidemia)  GERD (gastroesophageal reflux disease)  Osteoporosis  S/P small bowel resection      MEDICATIONS  (STANDING):  atorvastatin 10 milliGRAM(s) Oral at bedtime  dextrose 5% + sodium chloride 0.9%. 1000 milliLiter(s) (70 mL/Hr) IV Continuous <Continuous>  pantoprazole  Injectable 40 milliGRAM(s) IV Push daily    MEDICATIONS  (PRN):  melatonin 3 milliGRAM(s) Oral at bedtime PRN Insomnia  ondansetron Injectable 4 milliGRAM(s) IV Push three times a day PRN Nausea and/or Vomiting      FAMILY HISTORY:      SOCIAL HISTORY:    [x ] Non-smoker  [ ] Smoker  [ ] Alcohol    Allergies    penicillins (Unknown)  sulfa drugs (Hives)    Intolerances    	    REVIEW OF SYSTEMS:  CONSTITUTIONAL: No fever, weight loss, or fatigue  EYES: No eye pain, visual disturbances, or discharge  ENT:  No difficulty hearing, tinnitus, vertigo; No sinus or throat pain  NECK: No pain or stiffness  RESPIRATORY: No cough, wheezing, chills or hemoptysis; +exertional  Shortness of Breath  CARDIOVASCULAR: No chest pain, palpitations, passing out, dizziness, or leg swelling  GASTROINTESTINAL: No abdominal or epigastric pain. No nausea, vomiting, or hematemesis; No diarrhea or constipation. No melena or hematochezia.  GENITOURINARY: No dysuria, frequency, hematuria, or incontinence  NEUROLOGICAL: No headaches, memory loss, loss of strength, numbness, or tremors  SKIN: No itching, burning, rashes, or lesions   LYMPH Nodes: No enlarged glands  ENDOCRINE: No heat or cold intolerance; No hair loss  MUSCULOSKELETAL: No joint pain or swelling; No muscle, back, or extremity pain  PSYCHIATRIC: No depression, anxiety, mood swings, or difficulty sleeping  HEME/LYMPH: No easy bruising, or bleeding gums  ALLERGY AND IMMUNOLOGIC: No hives or eczema	    [x ] All others negative	  [ ] Unable to obtain    PHYSICAL EXAM:  T(C): 36.7 (06-04-25 @ 09:15), Max: 36.7 (06-04-25 @ 08:30)  HR: 76 (06-04-25 @ 09:15) (76 - 85)  BP: 113/58 (06-04-25 @ 09:15) (113/58 - 141/77)  RR: 18 (06-04-25 @ 09:15) (18 - 18)  SpO2: 97% (06-04-25 @ 09:15) (95% - 98%)  Wt(kg): --  I&O's Summary      Appearance: Normal	  HEENT:   Normal oral mucosa, PERRL, EOMI	  Lymphatic: No lymphadenopathy  Cardiovascular: Normal S1 S2, No JVD, + murmurs, No edema  Respiratory: rhonchi  Psychiatry: A & O x 3, Mood & affect appropriate  Gastrointestinal:  Soft, Non-tender, + BS	  Skin: No rashes, No ecchymoses, No cyanosis	  Neurologic: Non-focal  Extremities: Normal range of motion, No clubbing, cyanosis or edema  Vascular: Peripheral pulses palpable 2+ bilaterally    TELEMETRY: 	    ECG:  	  RADIOLOGY:  OTHER: 	  	  LABS:	 	    CARDIAC MARKERS:                              8.9    3.15  )-----------( 194      ( 04 Jun 2025 07:28 )             28.3     06-03    143  |  109[H]  |  13  ----------------------------<  105[H]  4.2   |  20[L]  |  0.74    Ca    8.0[L]      03 Jun 2025 07:28    TPro  6.2  /  Alb  3.9  /  TBili  0.6  /  DBili  x   /  AST  25  /  ALT  27  /  AlkPhos  59  06-02    proBNP:   Lipid Profile:   HgA1c:   TSH:   PT/INR - ( 02 Jun 2025 14:40 )   PT: 12.9 sec;   INR: 1.13 ratio         PTT - ( 02 Jun 2025 14:40 )  PTT:37.2 sec    PREVIOUS DIAGNOSTIC TESTING:     < from: 12 Lead ECG (06.02.25 @ 15:35) >  Diagnosis Line NORMAL SINUS RHYTHM  NORMAL ECG  WHEN COMPARED WITH ECG OF 10-APR-2024 10:09,  NO SIGNIFICANT CHANGE WAS FOUND      < from: Nuclear Stress Test-Exercise (12.26.14 @ 12:46) >  * Exercise capacity: 8 METS, Average for age and gender.  * Chest Pain: No chest pain with exercise.  * Symptom: Fatigue.  * HR Response: Appropriate.  * BP Response: Appropriate.  * Heart Rhythm:Sinus Rhythm - 85 BPM.  * Baseline ECG: No significant ST abnormalities.  * ECG Changes: ST Depression: 1.5 mm horizontal in leads  V3-V6 started at 07:00 min of exercise at HR of 155 and  persisted 01:00 min into recovery.  * Arrhythmia: Single VPD occurred during recovery.  * Review of raw data shows: The study is of good technical  quality.  * The left ventricle was small in size. There is a small,  mild defect in the apex that is fixed and demonstrates  normal wall motion suggestive of breast/soft tissue  attenuation artifact.    < from: CT Abdomen and Pelvis w/ IV Cont (06.03.25 @ 11:59) >  1. Persistent markedly dilated/patulous proximal duodenum, esophagus, and   distal ileum, similar to prior study suggestive of an underlying   functional etiology. No evidence of obstruction.  2. Low-attenuation stool is noted throughout the colon, which can be seen   with steatorrhea. Clinical correlation is suggested.  3. Incidental shunt between the inferior mesenteric vein and IVC. The   duodenal narrowing occurs as it drapes along the anterior aspect of the   shunt.

## 2025-06-04 NOTE — PRE PROCEDURE NOTE - PRE PROCEDURE EVALUATION
Attending Physician: Milton Cornelius MD    Procedure: EGD    Indication for Procedure: Duodenal stricture  ________________________________________________________  PAST MEDICAL & SURGICAL HISTORY:  Chronic idiopathic pseudo-obstruction of intestine      Breast cancer      HLD (hyperlipidemia)      GERD (gastroesophageal reflux disease)      Osteoporosis      S/P small bowel resection        ALLERGIES:  penicillins (Unknown)  sulfa drugs (Hives)    HOME MEDICATIONS:  ergocalciferol 1.25 mg (50,000 intl units) oral tablet: 1 tab(s) orally 2 times a week sunday and wednesday  methenamine hippurate 1 g oral tablet: 1 tab(s) orally once a day  MiraLax oral powder for reconstitution: 17 gram(s) orally once a day  miSOPROStol 200 mcg oral tablet: 1 tab(s) orally once a day  pantoprazole 40 mg oral delayed release tablet: 1 tab(s) orally once a day  Prolia 60 mg/mL subcutaneous solution: 60 milligram(s) subcutaneously every 6 months  Reglan 10 mg oral tablet: 2 tab(s) orally 2 times a day  simvastatin 10 mg oral tablet: 1 tab(s) orally once a day (at bedtime)    AICD/PPM: [ ] yes   [ x] no    PERTINENT LAB DATA:                        8.9    3.15  )-----------( 194      ( 04 Jun 2025 07:28 )             28.3     06-03    143  |  109[H]  |  13  ----------------------------<  105[H]  4.2   |  20[L]  |  0.74    Ca    8.0[L]      03 Jun 2025 07:28    TPro  6.2  /  Alb  3.9  /  TBili  0.6  /  DBili  x   /  AST  25  /  ALT  27  /  AlkPhos  59  06-02    PT/INR - ( 02 Jun 2025 14:40 )   PT: 12.9 sec;   INR: 1.13 ratio         PTT - ( 02 Jun 2025 14:40 )  PTT:37.2 sec            PHYSICAL EXAMINATION:      Weight (kg): 56.7T(C): 36.7  HR: 76  BP: 113/58  RR: 18  SpO2: 97%    Constitutional: NAD  HEENT: PERRLA, EOMI,    Neck:  No JVD  Respiratory: CTAB/L  Cardiovascular: S1 and S2  Gastrointestinal: BS+, soft, NT/ND  Extremities: No peripheral edema  Neurological: A/O x 3, no focal deficits  Psychiatric: Normal mood, normal affect  Skin: No rashes    ASA Class: I [ ]  II [ ]  III [x ]  IV [ ]    COMMENTS:    The patient is a suitable candidate for the planned procedure unless box checked [ ]  No, explain:

## 2025-06-04 NOTE — CONSULT NOTE ADULT - ASSESSMENT
80-year-old female PMHx GERD, HTN, chronic idiopathic pseudoobstruction of intestine presents ED complaining of black stools since this morning.  Patient states she had endoscopy this past Friday with her GI (Dr. Roscoe Lorenzo) and states she was told "area between stomach and esophagus was very narrow, could not pass tube".  Additionally of note she had been fasting prior to endoscopy, morning of procedure did have 2 episodes of witnessed syncope without head trauma, did endorse preceding lightheadedness prior to both episodes.  Feeling well since endoscopy until today, noted black stools with her bowel movement which was new, called GI was advised to come to ED.  Does endorse some mild fatigue.  At this time denies chest pain, shortness of breath, dizziness, abdominal pain.  pt with exertional sob , no known hjx of CAD, will get reports from DR Draper  ct scan noted with evidence of shunt may explain sob  check echo  will discuss with  GI  repeat ecg  check orthosttaic bp

## 2025-06-04 NOTE — PROGRESS NOTE ADULT - ASSESSMENT
80-year-old f      h/o  right   breast Ca s/p  RT,  HTN,  remote hx PUD, remote hx SBO  s/p SBR 1987,,.  c/c  idiopathic small bowel pseudoobstruction         c/o   black stools x3 days ,  dizziness       egd,   past Friday 5/30 with her GI  DrPaul Lorenzo  /  [facility] , with  very narrowed proximal duodenum with debris in esophagus     had   CTAP for   weight loss,  on 5/30, . duodenal narrowing with upstream dilation     then,  was  at [facility] on Friday after  egd,  for dizziness/fatigue and received IV fluids after which the dark stools started. /  denies  cp/sob/  abd pain      melena/  dizziness     s/p  prbc/.  seen by [place]  gi on arrival     prior  ct,  c/c pseudo obstruction, small intestine .  and  has   c/c  abd  distension   HLD,  on lipitor     yesterday,  with loud  volume, pt  stated she has  fired  2 gi consultants  , and  both  RN  and  nurse   manager  were  present in room    CT   a/p,  c/c  patulous, dilated  esophagus and  ileum,  no obstruction,  shunt between inf mesenteric v  and IVC./  vasc  following        incidental findings   on ct.  likely  from prior . distant  bowel surgery     await  egd /    [place]  gi following  , hb  8.9    card d param k  madonna    dvt  ppx          rad< from: CT Abdomen and Pelvis w/ IV Cont (06.03.25 @ 11:59) >  IMPRESSION:  1. Persistent markedly dilated/patulous proximal duodenum, esophagus, and   distal ileum, similar to prior study suggestive of an underlying   functional etiology. No evidence of obstruction.  2. Low-attenuation stool is noted throughout the colon, which can be seen   with steatorrhea. Clinical correlation is suggested.  3. Incidental shunt between the inferior mesenteric vein and IVC. The   duodenal narrowing occurs as it drapes along the anterior aspect of the   shunt.  --- End of Report ---          <            80-year-old f      h/o  right   breast Ca s/p  RT,  HTN,  remote hx PUD, remote hx SBO  s/p SBR 1987,,.  c/c  idiopathic small bowel pseudoobstruction         c/o   black stools x3 days ,  dizziness       egd,   past Friday 5/30 with her GI  DrPaul Lorenzo  /  [facility] , with  very narrowed proximal duodenum with debris in esophagus     had   CTAP for   weight loss,  on 5/30, . duodenal narrowing with upstream dilation     then,  was  at [facility] on Friday after  egd,  for dizziness/fatigue and received IV fluids after which the dark stools started. /  denies  cp/sob/  abd pain      melena/  dizziness     s/p  prbc/.  seen by [place]  gi on arrival     prior  ct,  c/c pseudo obstruction, small intestine .  and  has   c/c  abd  distension   HLD,  on lipitor     yesterday,  with loud  volume, pt  stated she has  fired  2 gi consultants  , and  both  RN  and  nurse   manager  were  present in room    CT   a/p,  c/c  patulous, dilated  esophagus and  ileum,  no obstruction,  shunt between inf mesenteric v  and IVC.     vasc  following /  discussed  with vacs attending this  am, no  intervention  planned       incidental findings   on ct.  likely  from prior . distant  bowel surgery     await  egd /    [place]  gi following  , hb  8.9    may start   d/c plans  ,  when cleared   by gi,  after   egd     card matt peacock    dvt  ppx          rad< from: CT Abdomen and Pelvis w/ IV Cont (06.03.25 @ 11:59) >  IMPRESSION:  1. Persistent markedly dilated/patulous proximal duodenum, esophagus, and   distal ileum, similar to prior study suggestive of an underlying   functional etiology. No evidence of obstruction.  2. Low-attenuation stool is noted throughout the colon, which can be seen   with steatorrhea. Clinical correlation is suggested.  3. Incidental shunt between the inferior mesenteric vein and IVC. The   duodenal narrowing occurs as it drapes along the anterior aspect of the   shunt.  --- End of Report ---          <            80-year-old f      h/o  right   breast Ca s/p  RT,  HTN,  remote hx PUD, remote hx SBO  s/p SBR 1987,,.  c/c  idiopathic small bowel pseudoobstruction         c/o   black stools x3 days ,  dizziness       egd,   past Friday 5/30 with her GI  DrPaul Lorenzo  /  [facility] , with  very narrowed proximal duodenum with debris in esophagus     had   CTAP for   weight loss,  on 5/30, . duodenal narrowing with upstream dilation     then,  was  at [facility] on Friday after  egd,  for dizziness/fatigue and received IV fluids after which the dark stools started. /  denies  cp/sob/  abd pain      melena/  dizziness     s/p  prbc/.  seen by [place]  gi on arrival     prior  ct,  c/c pseudo obstruction, small intestine .  and  has   c/c  abd  distension   HLD,  on lipitor     yesterday,  with loud  volume, pt  stated she has  fired  2 gi consultants  , and  both  RN  and  nurse   manager  were  present in room    CT   a/p,  c/c  patulous, dilated  esophagus and  ileum,  no obstruction,  shunt between inf mesenteric v  and IVC.     vasc  following /  discussed  with vacs attending  dr oro, this  am, no  intervention  planned       incidental findings   on ct.  likely  from prior . distant  bowel surgery     has  exertional sob,   card  f/p.  ha s no chf    await  egd /    [place]  gi following  , hb  8.9    may start   d/c plans  ,  when cleared   by gi,  after   egd     card matt peacock    dvt  ppx          rad< from: CT Abdomen and Pelvis w/ IV Cont (06.03.25 @ 11:59) >  IMPRESSION:  1. Persistent markedly dilated/patulous proximal duodenum, esophagus, and   distal ileum, similar to prior study suggestive of an underlying   functional etiology. No evidence of obstruction.  2. Low-attenuation stool is noted throughout the colon, which can be seen   with steatorrhea. Clinical correlation is suggested.  3. Incidental shunt between the inferior mesenteric vein and IVC. The   duodenal narrowing occurs as it drapes along the anterior aspect of the   shunt.  --- End of Report ---          <

## 2025-06-04 NOTE — CONSULT NOTE ADULT - ASSESSMENT
80-year-old female PMHx breast Ca s/p RTX, GERD, HTN, remote hx PUD, remote hx SBO s/p SBR 1987, chronic idiopathic small bowel pseudoobstruction (usually on PPI, reglan, pepcid, miralax) who presented to [facility] for a chief complaint of black stools x3 days with incr BM frequency and stools softer.  Vascular surgery consulted for CT findings of incidental shunt between the inferior mesenteric vein and IVC., with consequent duodenal narrowing as it drapes along the anterior aspect of the shunt.    Recommendations  -Plan to be discussed this AM on morning rounds    Vascular Surgery [id number]

## 2025-06-05 LAB
HCT VFR BLD CALC: 29.2 % — LOW (ref 34.5–45)
HGB BLD-MCNC: 9.1 G/DL — LOW (ref 11.5–15.5)
MCHC RBC-ENTMCNC: 27.7 PG — SIGNIFICANT CHANGE UP (ref 27–34)
MCHC RBC-ENTMCNC: 31.2 G/DL — LOW (ref 32–36)
MCV RBC AUTO: 89 FL — SIGNIFICANT CHANGE UP (ref 80–100)
NRBC BLD AUTO-RTO: 0 /100 WBCS — SIGNIFICANT CHANGE UP (ref 0–0)
PLATELET # BLD AUTO: 214 K/UL — SIGNIFICANT CHANGE UP (ref 150–400)
RBC # BLD: 3.28 M/UL — LOW (ref 3.8–5.2)
RBC # FLD: 16.1 % — HIGH (ref 10.3–14.5)
WBC # BLD: 4.98 K/UL — SIGNIFICANT CHANGE UP (ref 3.8–10.5)
WBC # FLD AUTO: 4.98 K/UL — SIGNIFICANT CHANGE UP (ref 3.8–10.5)

## 2025-06-05 PROCEDURE — 74176 CT ABD & PELVIS W/O CONTRAST: CPT | Mod: 26

## 2025-06-05 PROCEDURE — 99232 SBSQ HOSP IP/OBS MODERATE 35: CPT | Mod: GC

## 2025-06-05 RX ADMIN — Medication 40 MILLIGRAM(S): at 11:35

## 2025-06-05 NOTE — PROGRESS NOTE ADULT - ASSESSMENT
80-year-old f      h/o  right   breast Ca s/p  RT,  HTN,  remote hx PUD, remote hx SBO  s/p SBR 1987,,.  c/c  idiopathic small bowel pseudoobstruction         c/o   black stools x3 days ,  dizziness       egd,   past Friday 5/30 with her GI  DrPaul Lorenzo  /  [facility] , with  very narrowed proximal duodenum with debris in esophagus     had   CTAP for   weight loss,  on 5/30, . duodenal narrowing with upstream dilation     then,  was  at [facility] on Friday after  egd,  for dizziness/fatigue and received IV fluids after which the dark stools started. /  denies  cp/sob/  abd pain      melena/  dizziness     s/p  prbc/.  seen by house  gi on arrival     prior  ct,  c/c pseudo obstruction, small intestine .  and  has   c/c  abd  distension   HLD,  on lipitor     yesterday,  with loud  volume, pt  stated she has  fired  2 gi consultants  , and  both  RN  and  nurse   manager  were  present in room    CT   a/p,  c/c  patulous, dilated  esophagus and  ileum,  no obstruction,  shunt between inf mesenteric v  and IVC.     vasc  following /  discussed  with vacs attending  dr oro, this  am, no  intervention  planned       incidental findings   on ct.  likely  from prior . distant  bowel surgery     has  exertional sob,   card  f/p.  ha s no chf      egd,  duodenal  structure,  with    food    ct  a/p  ,  with  po  contrast    card matt peacock    dvt  ppx          rad< from: CT Abdomen and Pelvis w/ IV Cont (06.03.25 @ 11:59) >  IMPRESSION:  1. Persistent markedly dilated/patulous proximal duodenum, esophagus, and   distal ileum, similar to prior study suggestive of an underlying   functional etiology. No evidence of obstruction.  2. Low-attenuation stool is noted throughout the colon, which can be seen   with steatorrhea. Clinical correlation is suggested.  3. Incidental shunt between the inferior mesenteric vein and IVC. The   duodenal narrowing occurs as it drapes along the anterior aspect of the   shunt.  --- End of Report ---          <

## 2025-06-05 NOTE — PROGRESS NOTE ADULT - SUBJECTIVE AND OBJECTIVE BOX
Patient is a 80y old  Female who presents with a chief complaint of dizziness/  melena (05 Jun 2025 07:42)      Interval Events:   - Patient feels otherwise well, somewhat bloated.     ROS:   A 12-point ROS was performed and negative except as noted in HPI.    Hospital Medications:  atorvastatin 10 milliGRAM(s) Oral at bedtime  dextrose 5% + sodium chloride 0.9%. 1000 milliLiter(s) IV Continuous <Continuous>  melatonin 3 milliGRAM(s) Oral at bedtime PRN  ondansetron Injectable 4 milliGRAM(s) IV Push three times a day PRN  pantoprazole  Injectable 40 milliGRAM(s) IV Push daily      PHYSICAL EXAM:   Vital Signs:  Vital Signs Last 24 Hrs  T(C): 36.5 (05 Jun 2025 08:50), Max: 36.7 (05 Jun 2025 04:37)  T(F): 97.7 (05 Jun 2025 08:50), Max: 98.1 (05 Jun 2025 04:37)  HR: 75 (05 Jun 2025 08:50) (73 - 94)  BP: 107/63 (05 Jun 2025 08:50) (105/61 - 169/66)  BP(mean): 96 (04 Jun 2025 14:21) (96 - 96)  RR: 18 (05 Jun 2025 08:50) (16 - 18)  SpO2: 94% (05 Jun 2025 08:50) (94% - 99%)    Parameters below as of 05 Jun 2025 08:50  Patient On (Oxygen Delivery Method): room air      Daily Height in cm: 157.48 (04 Jun 2025 14:21)    Daily     GENERAL: no acute distress  NEURO: alert  HEENT: anicteric sclera, no conjunctival pallor appreciated  CHEST: no respiratory distress, no accessory muscle use  CARDIAC: regular rate  ABDOMEN: mildly distended abd, no TTP, no rebound or guarding   EXTREMITIES: warm, well perfused, no edema  SKIN: no lesions noted    LABS: reviewed                        9.1    4.98  )-----------( 214      ( 05 Jun 2025 07:18 )             29.2               Interval Diagnostic Studies: see sunrise for full report

## 2025-06-05 NOTE — CONSULT NOTE ADULT - SUBJECTIVE AND OBJECTIVE BOX
Interventional Radiology    Evaluate for Procedure: Evaluate shunt between the inferior mesenteric vein and IVC.    HPI: 80-year-old female PMHx breast Ca s/p RTX, GERD, HTN, remote hx PUD, remote hx SBO s/p SBR , chronic idiopathic small bowel pseudoobstruction (usually on PPI, reglan, pepcid, miralax) who presents to ED for a chief complaint of black stools x3 days with incr BM frequency and stools softer.  Patient states she had EGD this past  with her GI (Dr. Roscoe Lorenzo) and had a very narrowed proximal duodenum with debris in esophagus/stomach that the endoscope could not pass (impression from scope is below). The scope was done because OP GI had gotten a CTAP for evaluation of weight loss about 1.5 weeks ago. The CTAP had shown concern for duodenal narrowing with upstream dilation and so then  EGD was performed. Allisonn reports she went to ER at [facility] on Friday after her EGD for dizziness/fatigue and received IV fluids after which the dark stools started. Currently Patient reports mild fatigue and dizziness.  At this time denies chest pain, shortness of breath, dizziness, abdominal pain. Patient reports that her BM's are usually light brown and she goes BiD at baseline. She does not take iron at home. Reports abdomen has been distended for many years. She reports before Friday she had an EGD/Colonoscopy 6 months ago with Dr. Lorenzo as well which were grossly normal to her knowledge (no reports available). Denies n/v/f/c/ap. She feels hungry currently. Reports eating a lot on . Vascular surgery consulted for CT findings of incidental shunt between the inferior mesenteric vein and IVC., with consequent duodenal narrowing as it drapes along the anterior aspect of the shunt. IR now consulted for evaluation of intervention.      Allergies: penicillins (Unknown)  sulfa drugs (Hives)    Medications (Abx/Cardiac/Anticoagulation/Blood Products)      Data:    T(C): 36.4  HR: 68  BP: 126/61  RR: 18  SpO2: 98%    -WBC 4.98 / HgB 9.1 / Hct 29.2 / Plt 214  -Na 143 / Cl 109 / BUN 13 / Glucose 105  -K 4.2 / CO2 20 / Cr 0.74  -ALT -- / Alk Phos -- / T.Bili --  -INR 1.13 / PTT 37.2    Radiology: < from: CT Abdomen and Pelvis w/ IV Cont (25 @ 11:59) >  IMPRESSION:  1. Persistent markedly dilated/patulous proximal duodenum, esophagus, and   distal ileum, similar to prior study suggestive of an underlying   functional etiology. No evidence of obstruction.  2. Low-attenuation stool is noted throughout the colon, which can be seen   with steatorrhea. Clinical correlation is suggested.  3. Incidental shunt between the inferior mesenteric vein and IVC. The   duodenal narrowing occurs as it drapes along the anterior aspect of the   shunt.    --- End of Report ---    < end of copied text >      Assessment/Plan: 80-year-old female PMHx breast Ca s/p RTX, GERD, HTN, remote hx PUD, remote hx SBO s/p SBR , chronic idiopathic small bowel pseudoobstruction (usually on PPI, reglan, pepcid, miralax) who presents to ED for a chief complaint of black stools x3 days with incr BM frequency and stools softer.  Patient states she had EGD this past  with her GI (Dr. Roscoe Lorenzo) and had a very narrowed proximal duodenum with debris in esophagus/stomach that the endoscope could not pass (impression from scope is below). The scope was done because OP GI had gotten a CTAP for evaluation of weight loss about 1.5 weeks ago. The CTAP had shown concern for duodenal narrowing with upstream dilation and so then  EGD was performed. Patietn reports she went to ER at [facility] on Friday after her EGD for dizziness/fatigue and received IV fluids after which the dark stools started. Currently Patient reports mild fatigue and dizziness.  At this time denies chest pain, shortness of breath, dizziness, abdominal pain. Patient reports that her BM's are usually light brown and she goes BiD at baseline. She does not take iron at home. Reports abdomen has been distended for many years. She reports before Friday she had an EGD/Colonoscopy 6 months ago with Dr. Lorenzo as well which were grossly normal to her knowledge (no reports available). Denies n/v/f/c/ap. She feels hungry currently. Reports eating a lot on . Vascular surgery consulted for CT findings of incidental shunt between the inferior mesenteric vein and IVC., with consequent duodenal narrowing as it drapes along the anterior aspect of the shunt. IR now consulted for evaluation of intervention.    -Imaging and case reviewed with Dr. Tejeda.  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   -No role for IR intervention.   -Above d/w primary team  -Continue current management per primary team      Any questions or concerns regarding above please reach out to IR:   -Available on [employer] teams  -During working hours (7a-5p): call -[phone]  -Emergent issues after 5pm: page: [phone]  -Non-emergent consults: Please place a [place] order "IR Consult" with an appropriate callback number  -Scheduling questions: [phone]  -Clinic/Outpatient [phone]

## 2025-06-05 NOTE — PROGRESS NOTE ADULT - SUBJECTIVE AND OBJECTIVE BOX
Date of Service: 06-05-25 @ 07:41           CARDIOLOGY     PROGRESS  NOTE   ________________________________________________  CHIEF COMPLAINT:Patient is a 80y old  Female who presents with a chief complaint of dizziness/  melena (04 Jun 2025 09:43)  no complain  	  REVIEW OF SYSTEMS:  CONSTITUTIONAL: No fever, weight loss, or fatigue  EYES: No eye pain, visual disturbances, or discharge  ENT:  No difficulty hearing, tinnitus, vertigo; No sinus or throat pain  NECK: No pain or stiffness  RESPIRATORY: No cough, wheezing, chills or hemoptysis; No Shortness of Breath  CARDIOVASCULAR: No chest pain, palpitations, passing out, dizziness, or leg swelling  GASTROINTESTINAL: No abdominal or epigastric pain. No nausea, vomiting, or hematemesis; No diarrhea or constipation. No melena or hematochezia.  GENITOURINARY: No dysuria, frequency, hematuria, or incontinence  NEUROLOGICAL: No headaches, memory loss, loss of strength, numbness, or tremors  SKIN: No itching, burning, rashes, or lesions   LYMPH Nodes: No enlarged glands  ENDOCRINE: No heat or cold intolerance; No hair loss  MUSCULOSKELETAL: No joint pain or swelling; No muscle, back, or extremity pain  PSYCHIATRIC: No depression, anxiety, mood swings, or difficulty sleeping  HEME/LYMPH: No easy bruising, or bleeding gums  ALLERGY AND IMMUNOLOGIC: No hives or eczema	    x[ ] All others negative	  [ ] Unable to obtain    PHYSICAL EXAM:  T(C): 36.7 (06-05-25 @ 04:37), Max: 36.7 (06-04-25 @ 08:30)  HR: 73 (06-05-25 @ 04:37) (73 - 94)  BP: 105/61 (06-05-25 @ 04:37) (105/61 - 169/66)  RR: 18 (06-05-25 @ 04:37) (16 - 18)  SpO2: 95% (06-05-25 @ 04:37) (95% - 99%)  Wt(kg): --  I&O's Summary    04 Jun 2025 07:01  -  05 Jun 2025 07:00  --------------------------------------------------------  IN: 560 mL / OUT: 0 mL / NET: 560 mL        Appearance: Normal	  HEENT:   Normal oral mucosa, PERRL, EOMI	  Lymphatic: No lymphadenopathy  Cardiovascular: Normal S1 S2, No JVD, + murmurs, No edema  Respiratory: rhonchi  Psychiatry: A & O x 3, Mood & affect appropriate  Gastrointestinal:  Soft, + mildly tender, + BS	  Skin: No rashes, No ecchymoses, No cyanosis	  Neurologic: Non-focal  Extremities:  No clubbing, cyanosis or edema  Vascular: Peripheral pulses palpable 2+ bilaterally    MEDICATIONS  (STANDING):  atorvastatin 10 milliGRAM(s) Oral at bedtime  dextrose 5% + sodium chloride 0.9%. 1000 milliLiter(s) (70 mL/Hr) IV Continuous <Continuous>  pantoprazole  Injectable 40 milliGRAM(s) IV Push daily      TELEMETRY: 	    ECG:  	  RADIOLOGY:  OTHER: 	  	  LABS:	 	    CARDIAC MARKERS:                          9.7    4.12  )-----------( 191      ( 04 Jun 2025 13:01 )             30.5       proBNP:   Lipid Profile:   HgA1c:   TSH:       < from: 12 Lead ECG (06.02.25 @ 15:35) >  Ventricular Rate 73 BPM    Atrial Rate 73 BPM    P-R Interval 170 ms    QRS Duration 78 ms    Q-T Interval 366 ms    QTC Calculation(Bazett) 403 ms    P Axis 57 degrees    R Axis 21 degrees    T Axis 44 degrees    Diagnosis Line NORMAL SINUS RHYTHM  NORMAL ECG  WHEN COMPARED WITH ECG OF 10-APR-2024 10:09,  NO SIGNIFICANT CHANGE WAS FOUND    -rec clears for now, but low threshold to stop and place NGT for decompression if develops nausea and/or vomiting  -NPO at midnight for possible EGD tomorrow (6/4)   -will require intubation for procedure given possible duodenal obstruction and abdo distension   -consider vascular evaluation of abnormal imaging findings on CTAP, unclear if contributing to current presentation  -monitor BM's  -trend vitals, CBC, and monitor for clinical signs of bleeding  -maintain active type and screen  -transfusion goal to maintain hemoglobin >/= 7.0 and platelets >/= 50  -avoid NSAIDs      < from: Upper Endoscopy (06.04.25 @ 13:44) >  Impression:          - Dilation in the entire esophagus.                       - Erythematous mucosa in thestomach.                       - Normal duodenal bulb.                       - One non-bleeding duodenal ulcer with no stigmata of bleeding.                       - Significantly dilated 2nd portion of the duodenum with a significant amount                of food product unable to be cleared endoscopically despite multiple                        attempts. Unable to visualize more distal stenosis.                       - No specimens collected.  Recommendation:      - Keep NPO.            - Recommend upper GI series or CT with oral contrast to evaluate stenosis as                        it was unable to be visualized endoscopically                       - Pantoprazole 40 mg IV daily.      Assessment and plan  ---------------------------  80-year-old female PMHx GERD, HTN, chronic idiopathic pseudoobstruction of intestine presents ED complaining of black stools since this morning.  Patient states she had endoscopy this past Friday with her GI (Dr. Roscoe Lorenzo) and states she was told "area between stomach and esophagus was very narrow, could not pass tube".  Additionally of note she had been fasting prior to endoscopy, morning of procedure did have 2 episodes of witnessed syncope without head trauma, did endorse preceding lightheadedness prior to both episodes.  Feeling well since endoscopy until today, noted black stools with her bowel movement which was new, called GI was advised to come to ED.  Does endorse some mild fatigue.  At this time denies chest pain, shortness of breath, dizziness, abdominal pain.  pt with exertional sob , no known hx of CAD, will get reports from DR Draper  ct scan noted with evidence of shunt may explain sob  check echo  will discuss with  GI  repeat ecg  check orthostatic bp  egd noted   will ask  Dr Loredo to get the copy of her cardiac testing  EGD noted   dvt prophylaxis

## 2025-06-05 NOTE — PROGRESS NOTE ADULT - SUBJECTIVE AND OBJECTIVE BOX
date of service: 06-05-25 @ 07:43  afberile  REVIEW OF SYSTEMS:  CONSTITUTIONAL: No fever,  no  weight loss  ENT:  No  tinnitus,   no   vertigo  NECK: No pain or stiffness  RESPIRATORY: No cough, wheezing, chills or hemoptysis;    No Shortness of Breath  CARDIOVASCULAR: No chest pain, palpitations, dizziness  GASTROINTESTINAL: No abdominal or epigastric pain. No nausea, vomiting, or hematemesis; No diarrhea  No melena or hematochezia.  GENITOURINARY: No dysuria, frequency, hematuria, or incontinence  NEUROLOGICAL: No headaches  SKIN: No itching,  no   rash  LYMPH Nodes: No enlarged glands  ENDOCRINE: No heat or cold intolerance  MUSCULOSKELETAL: No joint pain or swelling  PSYCHIATRIC: No depression, anxiety  HEME/LYMPH: No easy bruising, or bleeding gums  ALLERGY AND IMMUNOLOGIC: No hives or eczema	    MEDICATIONS  (STANDING):  atorvastatin 10 milliGRAM(s) Oral at bedtime  dextrose 5% + sodium chloride 0.9%. 1000 milliLiter(s) (70 mL/Hr) IV Continuous <Continuous>  pantoprazole  Injectable 40 milliGRAM(s) IV Push daily    MEDICATIONS  (PRN):  melatonin 3 milliGRAM(s) Oral at bedtime PRN Insomnia  ondansetron Injectable 4 milliGRAM(s) IV Push three times a day PRN Nausea and/or Vomiting      Vital Signs Last 24 Hrs  T(C): 36.7 (05 Jun 2025 04:37), Max: 36.7 (04 Jun 2025 08:30)  T(F): 98.1 (05 Jun 2025 04:37), Max: 98.1 (05 Jun 2025 04:37)  HR: 73 (05 Jun 2025 04:37) (73 - 94)  BP: 105/61 (05 Jun 2025 04:37) (105/61 - 169/66)  BP(mean): 96 (04 Jun 2025 14:21) (96 - 96)  RR: 18 (05 Jun 2025 04:37) (16 - 18)  SpO2: 95% (05 Jun 2025 04:37) (95% - 99%)    Parameters below as of 05 Jun 2025 04:37  Patient On (Oxygen Delivery Method): room air      CAPILLARY BLOOD GLUCOSE        I&O's Summary    04 Jun 2025 07:01  -  05 Jun 2025 07:00  --------------------------------------------------------  IN: 560 mL / OUT: 0 mL / NET: 560 mL          Appearance: Normal	  HEENT:   Normal oral mucosa, PERRL, EOMI	  Lymphatic: No lymphadenopathy  Cardiovascular: Normal S1 S2, No JVD  Respiratory: Lungs clear to auscultation	  Gastrointestinal:  Soft, Non-tender, + BS	  Skin: No rash, No ecchymoses	  Extremities:     LABS:                        9.7    4.12  )-----------( 191      ( 04 Jun 2025 13:01 )             30.5                                   Consultant(s) Notes Reviewed:      Care Discussed with Consultants/Other Providers:

## 2025-06-05 NOTE — PROGRESS NOTE ADULT - ASSESSMENT
80-year-old female PMHx breast Ca s/p RTX, GERD, HTN, remote hx PUD, remote hx SBO s/p SBR 1987, chronic idiopathic pseudoobstruction of small bowel (usually on PPI, reglan, pepcid, miralax) who presents to ED for a chief complaint of black stools x3 days with incr BM frequency and stools softer.  Patient states she had EGD this past Friday 5/30 with her GI (Dr. Roscoe Lorenzo) and had a very narrowed proximal duodenum with debris in esophagus/stomach that the endoscope could not pass.    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 Nova 5/30 w/ narrowed prox duodenum w/ debris in esophagus/stomach that the endoscope could not pass  - CT w/ IV contrast demonstrating markedly dilated/patulous proximal duodenum, esophagus, distal ileum w/ incidental shunt between the IVC and inferior mesenteric vein w/ duodenal narrowing as it drapes long anterior aspect of the shunt  - EGD 6/4 demonstrating patulous esophagus as well as dilated 2nd portion of the duodenum w/ significant food and bile - as such unable to visualize duodenal narrowing despite multiple maneuvers including food removal w/ mejia net and excavator. Nonbleeding duodenal ulcer - possible source of recent GI bleeding, though no blood throughout upper GI tract.     Ddx for patient's duodenal narrowing would include CIPO vs compression 2/2 venous shunt between IVC and inferior mesenteric vein vs duodenal stricture vs less likely duodenal mass (though not seen on CT)    Recommendations:  - Recommend urgent CT A/P w/ oral contrast or GI series to evaluate area of stenosis and r/o obstruction  - If no evidence of obstruction can advance to clear liquid diet  - If no obstruction will plan on repeating EGD after 3-4 days of clear liquid diet to make sure that there is minimal or no food products present in upper GI tract to interfere w/ visualization  - Can continue home Miralax and Reglan if w/o obstruction.    - Pantoprazole 40 mg IV QD     Note incomplete until finalized by attending signature/attestation.    Tito Marshall  GI/Hepatology Fellow, PGY-4    MONDAY-FRIDAY 8AM-5PM:  Please message via [facility] [employer] or email [email or web address]     On Weekends/Holidays (All day) and Weekdays after 5 PM to 8 AM  For nonurgent consults please email:  Please email [email or web address]  For urgent consults:  Please contact on call GI team. See Amion schedule ([facility]), [employer] paging system ([facility]), or call hospital  ([facility])

## 2025-06-06 ENCOUNTER — RESULT REVIEW (OUTPATIENT)
Age: 80
End: 2025-06-06

## 2025-06-06 LAB
ANION GAP SERPL CALC-SCNC: 12 MMOL/L — SIGNIFICANT CHANGE UP (ref 5–17)
ANION GAP SERPL CALC-SCNC: 13 MMOL/L — SIGNIFICANT CHANGE UP (ref 5–17)
ANION GAP SERPL CALC-SCNC: 14 MMOL/L — SIGNIFICANT CHANGE UP (ref 5–17)
ANION GAP SERPL CALC-SCNC: 14 MMOL/L — SIGNIFICANT CHANGE UP (ref 5–17)
BUN SERPL-MCNC: 8 MG/DL — SIGNIFICANT CHANGE UP (ref 7–23)
CALCIUM SERPL-MCNC: 6.2 MG/DL — CRITICAL LOW (ref 8.4–10.5)
CALCIUM SERPL-MCNC: 6.7 MG/DL — LOW (ref 8.4–10.5)
CALCIUM SERPL-MCNC: 6.8 MG/DL — LOW (ref 8.4–10.5)
CALCIUM SERPL-MCNC: 7.7 MG/DL — LOW (ref 8.4–10.5)
CHLORIDE SERPL-SCNC: 111 MMOL/L — HIGH (ref 96–108)
CHLORIDE SERPL-SCNC: 112 MMOL/L — HIGH (ref 96–108)
CHLORIDE SERPL-SCNC: 112 MMOL/L — HIGH (ref 96–108)
CHLORIDE SERPL-SCNC: 114 MMOL/L — HIGH (ref 96–108)
CO2 SERPL-SCNC: 17 MMOL/L — LOW (ref 22–31)
CO2 SERPL-SCNC: 17 MMOL/L — LOW (ref 22–31)
CO2 SERPL-SCNC: 18 MMOL/L — LOW (ref 22–31)
CO2 SERPL-SCNC: 19 MMOL/L — LOW (ref 22–31)
CREAT SERPL-MCNC: 0.62 MG/DL — SIGNIFICANT CHANGE UP (ref 0.5–1.3)
CREAT SERPL-MCNC: 0.64 MG/DL — SIGNIFICANT CHANGE UP (ref 0.5–1.3)
CREAT SERPL-MCNC: 0.64 MG/DL — SIGNIFICANT CHANGE UP (ref 0.5–1.3)
CREAT SERPL-MCNC: 0.66 MG/DL — SIGNIFICANT CHANGE UP (ref 0.5–1.3)
EGFR: 88 ML/MIN/1.73M2 — SIGNIFICANT CHANGE UP
EGFR: 88 ML/MIN/1.73M2 — SIGNIFICANT CHANGE UP
EGFR: 89 ML/MIN/1.73M2 — SIGNIFICANT CHANGE UP
GLUCOSE SERPL-MCNC: 101 MG/DL — HIGH (ref 70–99)
GLUCOSE SERPL-MCNC: 123 MG/DL — HIGH (ref 70–99)
GLUCOSE SERPL-MCNC: 93 MG/DL — SIGNIFICANT CHANGE UP (ref 70–99)
GLUCOSE SERPL-MCNC: 97 MG/DL — SIGNIFICANT CHANGE UP (ref 70–99)
HCT VFR BLD CALC: 27.7 % — LOW (ref 34.5–45)
HGB BLD-MCNC: 8.9 G/DL — LOW (ref 11.5–15.5)
MAGNESIUM SERPL-MCNC: 1.7 MG/DL — SIGNIFICANT CHANGE UP (ref 1.6–2.6)
MCHC RBC-ENTMCNC: 29 PG — SIGNIFICANT CHANGE UP (ref 27–34)
MCHC RBC-ENTMCNC: 32.1 G/DL — SIGNIFICANT CHANGE UP (ref 32–36)
MCV RBC AUTO: 90.2 FL — SIGNIFICANT CHANGE UP (ref 80–100)
NRBC BLD AUTO-RTO: 0 /100 WBCS — SIGNIFICANT CHANGE UP (ref 0–0)
PHOSPHATE SERPL-MCNC: 1.9 MG/DL — LOW (ref 2.5–4.5)
PLATELET # BLD AUTO: 192 K/UL — SIGNIFICANT CHANGE UP (ref 150–400)
POTASSIUM SERPL-MCNC: 3.4 MMOL/L — LOW (ref 3.5–5.3)
POTASSIUM SERPL-MCNC: 3.5 MMOL/L — SIGNIFICANT CHANGE UP (ref 3.5–5.3)
POTASSIUM SERPL-MCNC: 3.6 MMOL/L — SIGNIFICANT CHANGE UP (ref 3.5–5.3)
POTASSIUM SERPL-MCNC: 3.7 MMOL/L — SIGNIFICANT CHANGE UP (ref 3.5–5.3)
POTASSIUM SERPL-SCNC: 3.4 MMOL/L — LOW (ref 3.5–5.3)
POTASSIUM SERPL-SCNC: 3.5 MMOL/L — SIGNIFICANT CHANGE UP (ref 3.5–5.3)
POTASSIUM SERPL-SCNC: 3.6 MMOL/L — SIGNIFICANT CHANGE UP (ref 3.5–5.3)
POTASSIUM SERPL-SCNC: 3.7 MMOL/L — SIGNIFICANT CHANGE UP (ref 3.5–5.3)
RBC # BLD: 3.07 M/UL — LOW (ref 3.8–5.2)
RBC # FLD: 16.6 % — HIGH (ref 10.3–14.5)
SODIUM SERPL-SCNC: 142 MMOL/L — SIGNIFICANT CHANGE UP (ref 135–145)
SODIUM SERPL-SCNC: 143 MMOL/L — SIGNIFICANT CHANGE UP (ref 135–145)
SODIUM SERPL-SCNC: 143 MMOL/L — SIGNIFICANT CHANGE UP (ref 135–145)
SODIUM SERPL-SCNC: 145 MMOL/L — SIGNIFICANT CHANGE UP (ref 135–145)
WBC # BLD: 3.36 K/UL — LOW (ref 3.8–10.5)
WBC # FLD AUTO: 3.36 K/UL — LOW (ref 3.8–10.5)

## 2025-06-06 PROCEDURE — 93356 MYOCRD STRAIN IMG SPCKL TRCK: CPT

## 2025-06-06 PROCEDURE — 93306 TTE W/DOPPLER COMPLETE: CPT | Mod: 26

## 2025-06-06 RX ORDER — CALCIUM CARBONATE 750 MG/1
1 TABLET ORAL DAILY
Refills: 0 | Status: DISCONTINUED | OUTPATIENT
Start: 2025-06-07 | End: 2025-06-07

## 2025-06-06 RX ORDER — CALCIUM GLUCONATE 20 MG/ML
2 INJECTION, SOLUTION INTRAVENOUS ONCE
Refills: 0 | Status: COMPLETED | OUTPATIENT
Start: 2025-06-06 | End: 2025-06-06

## 2025-06-06 RX ORDER — POLYETHYLENE GLYCOL 3350 17 G/17G
17 POWDER, FOR SOLUTION ORAL
Refills: 0 | Status: DISCONTINUED | OUTPATIENT
Start: 2025-06-06 | End: 2025-06-09

## 2025-06-06 RX ORDER — CALCIUM CARBONATE 750 MG/1
1 TABLET ORAL ONCE
Refills: 0 | Status: COMPLETED | OUTPATIENT
Start: 2025-06-06 | End: 2025-06-06

## 2025-06-06 RX ORDER — METOCLOPRAMIDE HCL 10 MG
5 TABLET ORAL ONCE
Refills: 0 | Status: COMPLETED | OUTPATIENT
Start: 2025-06-06 | End: 2025-06-06

## 2025-06-06 RX ADMIN — Medication 40 MILLIGRAM(S): at 11:32

## 2025-06-06 RX ADMIN — Medication 3 MILLIGRAM(S): at 22:19

## 2025-06-06 RX ADMIN — Medication 5 MILLIGRAM(S): at 13:09

## 2025-06-06 RX ADMIN — SODIUM CHLORIDE 70 MILLILITER(S): 9 INJECTION, SOLUTION INTRAVENOUS at 05:35

## 2025-06-06 RX ADMIN — Medication 10 MILLIGRAM(S): at 21:59

## 2025-06-06 RX ADMIN — CALCIUM GLUCONATE 200 GRAM(S): 20 INJECTION, SOLUTION INTRAVENOUS at 15:22

## 2025-06-06 NOTE — PROGRESS NOTE ADULT - SUBJECTIVE AND OBJECTIVE BOX
Date of Service: 06-06-25 @ 09:27           CARDIOLOGY     PROGRESS  NOTE   ________________________________________________  CHIEF COMPLAINT:Patient is a 80y old  Female who presents with a chief complaint of dizziness/  melena (05 Jun 2025 17:12)  no complain  	  REVIEW OF SYSTEMS:  CONSTITUTIONAL: No fever, weight loss, or fatigue  EYES: No eye pain, visual disturbances, or discharge  ENT:  No difficulty hearing, tinnitus, vertigo; No sinus or throat pain  NECK: No pain or stiffness  RESPIRATORY: No cough, wheezing, chills or hemoptysis; No Shortness of Breath  CARDIOVASCULAR: No chest pain, palpitations, passing out, dizziness, or leg swelling  GASTROINTESTINAL: + abdominal or epigastric pain. No nausea, vomiting, or hematemesis; No diarrhea or constipation. No melena or hematochezia.  GENITOURINARY: No dysuria, frequency, hematuria, or incontinence  NEUROLOGICAL: No headaches, memory loss, loss of strength, numbness, or tremors  SKIN: No itching, burning, rashes, or lesions   LYMPH Nodes: No enlarged glands  ENDOCRINE: No heat or cold intolerance; No hair loss  MUSCULOSKELETAL: No joint pain or swelling; No muscle, back, or extremity pain  PSYCHIATRIC: No depression, anxiety, mood swings, or difficulty sleeping  HEME/LYMPH: No easy bruising, or bleeding gums  ALLERGY AND IMMUNOLOGIC: No hives or eczema	    [x ] All others negative	  [ ] Unable to obtain    PHYSICAL EXAM:  T(C): 36.5 (06-06-25 @ 04:59), Max: 36.5 (06-05-25 @ 19:44)  HR: 76 (06-06-25 @ 04:59) (68 - 76)  BP: 127/62 (06-06-25 @ 04:59) (126/61 - 137/68)  RR: 18 (06-06-25 @ 04:59) (18 - 18)  SpO2: 96% (06-06-25 @ 04:59) (96% - 98%)  Wt(kg): --  I&O's Summary    05 Jun 2025 07:01  -  06 Jun 2025 07:00  --------------------------------------------------------  IN: 2070 mL / OUT: 0 mL / NET: 2070 mL        Appearance: Normal	  HEENT:   Normal oral mucosa, PERRL, EOMI	  Lymphatic: No lymphadenopathy  Cardiovascular: Normal S1 S2, No JVD, + murmurs, No edema  Respiratory: rhonchi  Psychiatry: A & O x 3, Mood & affect appropriate  Gastrointestinal:  Soft, Non-tender, + BS	  Skin: No rashes, No ecchymoses, No cyanosis	  Extremities: Normal range of motion, No clubbing, cyanosis or edema  Vascular: Peripheral pulses palpable 2+ bilaterally    MEDICATIONS  (STANDING):  atorvastatin 10 milliGRAM(s) Oral at bedtime  dextrose 5% + sodium chloride 0.9%. 1000 milliLiter(s) (70 mL/Hr) IV Continuous <Continuous>  pantoprazole  Injectable 40 milliGRAM(s) IV Push daily      TELEMETRY: 	    ECG:  	  RADIOLOGY:  OTHER: 	  	  LABS:	 	    CARDIAC MARKERS:                          8.9    3.36  )-----------( 192      ( 06 Jun 2025 07:23 )             27.7     06-06    145  |  114[H]  |  8   ----------------------------<  101[H]  3.4[L]   |  18[L]  |  0.64    Ca    6.2[LL]      06 Jun 2025 07:25      proBNP:   Lipid Profile:   HgA1c:   TSH:     - Recommend urgent CT A/P w/ oral contrast or GI series to evaluate area of stenosis and r/o obstruction  - If no evidence of obstruction can advance to clear liquid diet  - If no obstruction will plan on repeating EGD after 3-4 days of clear liquid diet to make sure that there is minimal or no food products present in upper GI tract to interfere w/ visualization  - Can continue home Miralax and Reglan if w/o obstruction.    - Pantoprazole 40 mg IV QD     < from: CT Abdomen and Pelvis w/ Oral Cont (06.05.25 @ 17:14) >  Preliminary  impression:  Lower chest:  Stable small hiatal hernia or an patulous esophagus.  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  Stable dilated distal ileum with gas and stool.      Assessment and plan  ---------------------------  80-year-old female PMHx GERD, HTN, chronic idiopathic pseudoobstruction of intestine presents ED complaining of black stools since this morning.  Patient states she had endoscopy this past Friday with her GI (Dr. Roscoe Lorenzo) and states she was told "area between stomach and esophagus was very narrow, could not pass tube".  Additionally of note she had been fasting prior to endoscopy, morning of procedure did have 2 episodes of witnessed syncope without head trauma, did endorse preceding lightheadedness prior to both episodes.  Feeling well since endoscopy until today, noted black stools with her bowel movement which was new, called GI was advised to come to ED.  Does endorse some mild fatigue.  At this time denies chest pain, shortness of breath, dizziness, abdominal pain.  pt with exertional sob , no known hx of CAD, will get reports from DR Draper  ct scan noted with evidence of shunt may explain sob  check echo  will discuss with  GI  repeat ecg  check orthostatic bp  egd noted   will ask  Dr Loredo to get the copy of her cardiac testing  EGD noted   dvt prophylaxis  ct scan noted with sig cardiomegaly, will check echo  gi ?surgery comments     	         Date of Service: 06-06-25 @ 09:27           CARDIOLOGY     PROGRESS  NOTE   ________________________________________________  CHIEF COMPLAINT:Patient is a 80y old  Female who presents with a chief complaint of dizziness/  melena (05 Jun 2025 17:12)  no complain  	  REVIEW OF SYSTEMS:  CONSTITUTIONAL: No fever, weight loss, or fatigue  EYES: No eye pain, visual disturbances, or discharge  ENT:  No difficulty hearing, tinnitus, vertigo; No sinus or throat pain  NECK: No pain or stiffness  RESPIRATORY: No cough, wheezing, chills or hemoptysis; No Shortness of Breath  CARDIOVASCULAR: No chest pain, palpitations, passing out, dizziness, or leg swelling  GASTROINTESTINAL: + abdominal or epigastric pain. No nausea, vomiting, or hematemesis; No diarrhea or constipation. No melena or hematochezia.  GENITOURINARY: No dysuria, frequency, hematuria, or incontinence  NEUROLOGICAL: No headaches, memory loss, loss of strength, numbness, or tremors  SKIN: No itching, burning, rashes, or lesions   LYMPH Nodes: No enlarged glands  ENDOCRINE: No heat or cold intolerance; No hair loss  MUSCULOSKELETAL: No joint pain or swelling; No muscle, back, or extremity pain  PSYCHIATRIC: No depression, anxiety, mood swings, or difficulty sleeping  HEME/LYMPH: No easy bruising, or bleeding gums  ALLERGY AND IMMUNOLOGIC: No hives or eczema	    [x ] All others negative	  [ ] Unable to obtain    PHYSICAL EXAM:  T(C): 36.5 (06-06-25 @ 04:59), Max: 36.5 (06-05-25 @ 19:44)  HR: 76 (06-06-25 @ 04:59) (68 - 76)  BP: 127/62 (06-06-25 @ 04:59) (126/61 - 137/68)  RR: 18 (06-06-25 @ 04:59) (18 - 18)  SpO2: 96% (06-06-25 @ 04:59) (96% - 98%)  Wt(kg): --  I&O's Summary    05 Jun 2025 07:01  -  06 Jun 2025 07:00  --------------------------------------------------------  IN: 2070 mL / OUT: 0 mL / NET: 2070 mL        Appearance: Normal	  HEENT:   Normal oral mucosa, PERRL, EOMI	  Lymphatic: No lymphadenopathy  Cardiovascular: Normal S1 S2, No JVD, + murmurs, No edema  Respiratory: rhonchi  Psychiatry: A & O x 3, Mood & affect appropriate  Gastrointestinal:   +mildly distended, + BS	  Skin: No rashes, No ecchymoses, No cyanosis	  Extremities: Normal range of motion, No clubbing, cyanosis or edema  Vascular: Peripheral pulses palpable 2+ bilaterally    MEDICATIONS  (STANDING):  atorvastatin 10 milliGRAM(s) Oral at bedtime  dextrose 5% + sodium chloride 0.9%. 1000 milliLiter(s) (70 mL/Hr) IV Continuous <Continuous>  pantoprazole  Injectable 40 milliGRAM(s) IV Push daily      TELEMETRY: 	    ECG:  	  RADIOLOGY:  OTHER: 	  	  LABS:	 	    CARDIAC MARKERS:                          8.9    3.36  )-----------( 192      ( 06 Jun 2025 07:23 )             27.7     06-06    145  |  114[H]  |  8   ----------------------------<  101[H]  3.4[L]   |  18[L]  |  0.64    Ca    6.2[LL]      06 Jun 2025 07:25      proBNP:   Lipid Profile:   HgA1c:   TSH:     - Recommend urgent CT A/P w/ oral contrast or GI series to evaluate area of stenosis and r/o obstruction  - If no evidence of obstruction can advance to clear liquid diet  - If no obstruction will plan on repeating EGD after 3-4 days of clear liquid diet to make sure that there is minimal or no food products present in upper GI tract to interfere w/ visualization  - Can continue home Miralax and Reglan if w/o obstruction.    - Pantoprazole 40 mg IV QD     < from: CT Abdomen and Pelvis w/ Oral Cont (06.05.25 @ 17:14) >  Preliminary  impression:  Lower chest:  Stable small hiatal hernia or an patulous esophagus.  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  Stable dilated distal ileum with gas and stool.      Assessment and plan  ---------------------------  80-year-old female PMHx GERD, HTN, chronic idiopathic pseudoobstruction of intestine presents ED complaining of black stools since this morning.  Patient states she had endoscopy this past Friday with her GI (Dr. Roscoe Lorenzo) and states she was told "area between stomach and esophagus was very narrow, could not pass tube".  Additionally of note she had been fasting prior to endoscopy, morning of procedure did have 2 episodes of witnessed syncope without head trauma, did endorse preceding lightheadedness prior to both episodes.  Feeling well since endoscopy until today, noted black stools with her bowel movement which was new, called GI was advised to come to ED.  Does endorse some mild fatigue.  At this time denies chest pain, shortness of breath, dizziness, abdominal pain.  pt with exertional sob , no known hx of CAD, will get reports from DR Draper  ct scan noted with evidence of shunt may explain sob  check echo  will discuss with  GI  repeat ecg  check orthostatic bp  egd noted   will ask  Dr Loredo to get the copy of her cardiac testing  EGD noted   dvt prophylaxis  ct scan noted with sig cardiomegaly, will check echo  gi ?surgery comments

## 2025-06-06 NOTE — PROVIDER CONTACT NOTE (OTHER) - ACTION/TREATMENT ORDERED:
ACP provided education to pt with RN at bedside. ACP explained risks to patient. pt wants to speak with Dr. Amaro and her endocrinologist tomorrow morning.

## 2025-06-06 NOTE — PROGRESS NOTE ADULT - ASSESSMENT
80-year-old f      h/o  right   breast Ca s/p  RT,  HTN,  remote hx PUD, remote hx SBO  s/p SBR 1987,,.  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 Roscoe Lorenzo  /  [facility] , with  very narrowed proximal duodenum with debris in esophagus     had   CT  a/p  for   weight loss,  on 5/30, . duodenal narrowing with upstream dilation     then,  was  at [facility] on Friday after  egd,  for dizziness/fatigue and received IV fluids after which the dark stools started. /  denies  cp/sob/  abd pain      melena/  dizziness     s/p  prbc/.  seen by house  gi on arrival     prior  ct,  c/c pseudo obstruction, small intestine .  and  has   c/c  abd  distension   HLD,  on lipitor     yesterday,  with loud  volume, pt  stated she has  fired  2 gi consultants  , and  both  RN  and  nurse   manager  were  present in room    CT   a/p,  c/c  patulous, dilated  esophagus and  ileum,  no obstruction,  shunt between inf mesenteric v  and IVC.     vasc  following /  discussed  with vacs attending  dr oro, this  am, no  intervention  planned       incidental findings   on ct.  likely  from prior . distant  bowel surgery     has  exertional sob,   card  f/p.  ha s no chf      egd,  duodenal  structure,  with    food    ct  a/p  ,  with  po  contrast, prelim report  noted    per  gi, rpt  egd  on monday/  keep on clears, pt rather  frustrated  with diet, etc    card matt peacock    dvt  ppx          rad< from: CT Abdomen and Pelvis w/ IV Cont (06.03.25 @ 11:59) >  IMPRESSION:  1. Persistent markedly dilated/patulous proximal duodenum, esophagus, and   distal ileum, similar to prior study suggestive of an underlying   functional etiology. No evidence of obstruction.  2. Low-attenuation stool is noted throughout the colon, which can be seen   with steatorrhea. Clinical correlation is suggested.  3. Incidental shunt between the inferior mesenteric vein and IVC. The   duodenal narrowing occurs as it drapes along the anterior aspect of the   shunt.  --- End of Report ---          <            80-year-old f      h/o  right   breast Ca s/p  RT,  HTN,  remote hx PUD, remote hx SBO  s/p SBR 1987,,.  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 Roscoe Lorenzo  /  [facility] , with  very narrowed proximal duodenum with debris in esophagus     had   CT  a/p  for   weight loss,  on 5/30, . duodenal narrowing with upstream dilation     then,  was  at [facility] on Friday after  egd,  for dizziness/fatigue and received IV fluids after which the dark stools started. /  denies  cp/sob/  abd pain      melena/  dizziness     s/p  prbc/.  seen by house  gi on arrival     prior  ct,  c/c pseudo obstruction, small intestine .  and  has   c/c  abd  distension   HLD,  on lipitor     yesterday,  with loud  volume, pt  stated she has  fired  2 gi consultants  , and  both  RN  and  nurse   manager  were  present in room    CT   a/p,  c/c  patulous, dilated  esophagus and  ileum,  no obstruction,  shunt between inf mesenteric v  and IVC.     vasc  following /  discussed  with vacs attending  dr oro, this  am, no  intervention  planned       incidental findings   on ct.  likely  from prior . distant  bowel surgery     has  exertional sob,   card  f/p.  ha s no chf      egd,  duodenal  structure,  with    food    ct  a/p  ,  with  po  contrast, prelim report  noted    per  gi, rpt  egd  on monday/  keep on clears, pt rather  frustrated  with diet, etc/  /  pe r np,  pt on reglan, pe r gi    card d r k  madonna    dvt  ppx          rad< from: CT Abdomen and Pelvis w/ IV Cont (06.03.25 @ 11:59) >  IMPRESSION:  1. Persistent markedly dilated/patulous proximal duodenum, esophagus, and   distal ileum, similar to prior study suggestive of an underlying   functional etiology. No evidence of obstruction.  2. Low-attenuation stool is noted throughout the colon, which can be seen   with steatorrhea. Clinical correlation is suggested.  3. Incidental shunt between the inferior mesenteric vein and IVC. The   duodenal narrowing occurs as it drapes along the anterior aspect of the   shunt.  --- End of Report ---          <

## 2025-06-06 NOTE — PROGRESS NOTE ADULT - SUBJECTIVE AND OBJECTIVE BOX
date of service: 06-06-25 @ 11:46  [facility]  REVIEW OF SYSTEMS:  CONSTITUTIONAL: No fever,  no  weight loss  ENT:  No  tinnitus,   no   vertigo  NECK: No pain or stiffness  RESPIRATORY: No cough, wheezing, chills or hemoptysis;    No Shortness of Breath  CARDIOVASCULAR: No chest pain, palpitations, dizziness  GASTROINTESTINAL: No abdominal or epigastric pain. No nausea, vomiting, or hematemesis; No diarrhea  No melena or hematochezia.  GENITOURINARY: No dysuria, frequency, hematuria, or incontinence  NEUROLOGICAL: No headaches  SKIN: No itching,  no   rash  LYMPH Nodes: No enlarged glands  ENDOCRINE: No heat or cold intolerance  MUSCULOSKELETAL: No joint pain or swelling  PSYCHIATRIC: No depression, anxiety  HEME/LYMPH: No easy bruising, or bleeding gums  ALLERGY AND IMMUNOLOGIC: No hives or eczema	    MEDICATIONS  (STANDING):  atorvastatin 10 milliGRAM(s) Oral at bedtime  dextrose 5% + sodium chloride 0.9%. 1000 milliLiter(s) (70 mL/Hr) IV Continuous <Continuous>  pantoprazole  Injectable 40 milliGRAM(s) IV Push daily  polyethylene glycol 3350 17 Gram(s) Oral two times a day    MEDICATIONS  (PRN):  melatonin 3 milliGRAM(s) Oral at bedtime PRN Insomnia  ondansetron Injectable 4 milliGRAM(s) IV Push three times a day PRN Nausea and/or Vomiting      Vital Signs Last 24 Hrs  T(C): 36.7 (06 Jun 2025 10:59), Max: 36.7 (06 Jun 2025 10:59)  T(F): 98 (06 Jun 2025 10:59), Max: 98 (06 Jun 2025 10:59)  HR: 74 (06 Jun 2025 10:59) (68 - 76)  BP: 104/60 (06 Jun 2025 10:59) (104/60 - 137/68)  BP(mean): --  RR: 18 (06 Jun 2025 10:59) (18 - 18)  SpO2: 97% (06 Jun 2025 10:59) (96% - 98%)    Parameters below as of 06 Jun 2025 10:59  Patient On (Oxygen Delivery Method): room air      CAPILLARY BLOOD GLUCOSE        I&O's Summary    05 Jun 2025 07:01  -  06 Jun 2025 07:00  --------------------------------------------------------  IN: 2070 mL / OUT: 0 mL / NET: 2070 mL    06 Jun 2025 07:01  -  06 Jun 2025 11:46  --------------------------------------------------------  IN: 360 mL / OUT: 0 mL / NET: 360 mL          Appearance: Normal	  HEENT:   Normal oral mucosa, PERRL, EOMI	  Lymphatic: No lymphadenopathy  Cardiovascular: Normal S1 S2, No JVD  Respiratory: Lungs clear to auscultation	  Gastrointestinal:  Soft, Non-tender, + BS	  Skin: No rash, No ecchymoses	  Extremities:     LABS:                        8.9    3.36  )-----------( 192      ( 06 Jun 2025 07:23 )             27.7     06-06    145  |  114[H]  |  8   ----------------------------<  101[H]  3.4[L]   |  18[L]  |  0.64    Ca    6.2[LL]      06 Jun 2025 07:25            Urinalysis Basic - ( 06 Jun 2025 07:25 )    Color: x / Appearance: x / SG: x / pH: x  Gluc: 101 mg/dL / Ketone: x  / Bili: x / Urobili: x   Blood: x / Protein: x / Nitrite: x   Leuk Esterase: x / RBC: x / WBC x   Sq Epi: x / Non Sq Epi: x / Bacteria: x                      Consultant(s) Notes Reviewed:      Care Discussed with Consultants/Other Providers:

## 2025-06-06 NOTE — CHART NOTE - NSCHARTNOTEFT_GEN_A_CORE
Informed by Endocrinologist, Dr. Garrett Curry pt to be transferred to telemetry for cardiac monitoring in the setting of hypocalcemia.    HPI: 80-year-old female PMHx GERD, HTN, chronic idiopathic pseudoobstruction of intestine presents ED complaining of black stools since this morning.  Patient states she had endoscopy this past Friday with her GI (Dr. Roscoe Lorenzo) and states she was told "area between stomach and esophagus was very narrow, could not pass tube".  Additionally of note she had been fasting prior to endoscopy, morning of procedure did have 2 episodes of witnessed syncope without head trauma, did endorse preceding lightheadedness prior to both episodes.  Feeling well since endoscopy until today, noted black stools with her bowel movement which was new, called GI was advised to come to ED.  Pt admitted for GI bleed with history of chronic idiopathic pseudoobstruction of intestine, GI following, s/p 1 unit PRBC and s/p  EGD  dilated duodenum with food unable to clear endoscopically. Patient with hypocalcemia - Ca 6.2 today, Endo consulted recommended transfer to tele, IV 2mg Ca Gluconate given  and continue with po Calcium daily with  BMP Q 6-8 hrs.    Vital Signs Last 24 Hrs  T(C): 36.4 (06 Jun 2025 20:39), Max: 36.7 (06 Jun 2025 10:59)  T(F): 97.5 (06 Jun 2025 20:39), Max: 98 (06 Jun 2025 10:59)  HR: 76 (06 Jun 2025 20:39) (72 - 76)  BP: 115/67 (06 Jun 2025 20:39) (104/60 - 127/62)  BP(mean): --  RR: 18 (06 Jun 2025 20:39) (18 - 18)  SpO2: 96% (06 Jun 2025 20:39) (96% - 100%)    Parameters below as of 06 Jun 2025 20:39  Patient On (Oxygen Delivery Method): room air      Calcium: 6.7 mg/dL (06.06.25 @ 13:47)   Calcium: 6.8 mg/dL (06.06.25 @ 13:47)   Calcium: 6.2 mg/dL (06.06.25 @ 07:25)   Calcium: 8.0 mg/dL (06.03.25 @ 07:28)   Calcium: 8.3 mg/dL (06.02.25 @ 14:40)   Calcium: 9.0 mg/dL (04.10.24 @ 11:21)   Calcium: 7.7 mg/dL      Pt received IV Calcium gluconate this evening and ordered to start oral Oscal tonight then daily with breakfast. Patient refusing oral Oscal, verbalizing she's had oral Calcium in the past and it makes her bloated and constipated. Informed we can give her anti-gas and medication for constipation, pt still refused.  Also refused transfer to telemetry floor, explained the importances of cardiac monitoring in the setting of hypocalcemia and the risk of dangerous arrhythmias potentially not being detected, pt still continued to refuse.  She wants to speak with her Endocrinologist and Cardiologist Dr. Amaro tomorrow.  Messaged Endocrinologist Dr. Garrett Curry pt refusing oral Calcium carbonate and telemetry transfer.  Will hold of serial BMP Q 6-8 hours for now unless patient agreeable to supplementation.  BMP done this evening after receiving IV Calcium gluconate with noted improvement of serum level to 7.7  BMP in AM ordered for now, Endo and Cards to follow up.  RN in room during discussion with patient    Bhaskar Peñaloza, [place]  [id number]

## 2025-06-06 NOTE — CHART NOTE - NSCHARTNOTEFT_GEN_A_CORE
LINDA GONZALEZ  [id number]  [facility] [address]    The patient is a 80yFemale with PMH of breast CA, HTN, PUD, SBO, osteoporosis on Prolia, a/w black stools and dizziness. Endocrinology consulted for hypocalcemia.    #Hypocalcemia  DDx includes recent Prolia, vitamin D deficiency, malabsorption, diarrhea, hypoparathyroidism  - please send basic workup including CMP, PTH, vitamin D, TSH, magnesium, phos  - start calcium carbonate 1250mg daily for now. give with a meal  - trend BMP q8h  - give calcium guconate 1g IV as needed for corrected calcium <7 or symptomatic hypocalcemia  - monitor on telemetry    Full consult to follow in the AM.    Garrett Curry MD  Endocrine Fellow  For nonurgent matters, please email [email or web address]. For urgent matters only, please call answering service at [phone] (weekdays), [phone] (nights/weekends). LINDA GONZALEZ  [id number]  [facility] [address]    The patient is a 80yFemale with PMH of breast CA, HTN, PUD, SBO, osteoporosis on Prolia, a/w black stools and dizziness. Endocrinology consulted for hypocalcemia.    #Hypocalcemia  DDx includes recent Prolia, vitamin D deficiency, malabsorption, diarrhea, hypoparathyroidism  - please send basic workup including CMP, PTH, vitamin D, TSH, magnesium, phos  - start calcium carbonate 1250mg daily for now. give with a meal  - trend BMP q6-8h for now  - give calcium guconate 1g IV as needed for corrected calcium <7 or symptomatic hypocalcemia  - monitor on telemetry    Full consult to follow in the AM.    Garrett Curry MD  Endocrine Fellow  For nonurgent matters, please email [email or web address] or [email or web address]. For urgent matters only, please call answering service at [phone] (weekdays), [phone] (nights/weekends). LINDA GONZALEZ  [id number]  [facility] [address]    The patient is a 80yFemale with PMH of breast CA, HTN, PUD, SBO, osteoporosis on Prolia, a/w black stools and dizziness. Endocrinology consulted for hypocalcemia.    #Hypocalcemia  DDx includes recent Prolia, vitamin D deficiency, malabsorption, diarrhea, hypoparathyroidism  - please send basic workup including CMP, PTH, vitamin D, TSH, magnesium, phos  - start calcium carbonate 1250mg daily for now. give with a meal  - trend BMP q6-8h for now  - give calcium guconate 1g IV as needed for corrected calcium <7 or symptomatic hypocalcemia  - monitor on telemetry    Full consult to follow in the AM. D/w ACP.    Garrett Curry MD  Endocrine Fellow  For nonurgent matters, please email [email or web address] or [email or web address]. For urgent matters only, please call answering service at [phone] (weekdays), [phone] (nights/weekends).

## 2025-06-06 NOTE — CHART NOTE - NSCHARTNOTEFT_GEN_A_CORE
GASTROENTEROLOGY:    CT A/p w/ oral contrast reviewed w/ passage of contrast past duodenum. No evidence of duodenal obstruction however w/ likely stenosis.    Recommendations:  - Will plan for repeat EGD on Monday 6/9 after patient has had multiple days of clear liquid diet to avoid food products obscuring visualization  - Keep on clear liquid diet  - NPO after midnight on 6/8 for EGD  - CBC, CMP at 4 AM 6/9.    Tiot Marshall  GI/Hepatology Fellow, PGY-4    MONDAY-FRIDAY 8AM-5PM:  Please message via GuideSpark or email [email or web address]     On Weekends/Holidays (All day) and Weekdays after 5 PM to 8 AM  For nonurgent consults please email:  Please email [email or web address] OR [email or web address]  For urgent consults:  Please contact on call GI team. See Amion schedule ([facility]), Flit paging system ([facility]), or call hospital  ([facility]) GASTROENTEROLOGY:    CT A/p w/ oral contrast reviewed w/ passage of contrast past duodenum. No evidence of duodenal obstruction however w/ likely stenosis.    Recommendations:  - Will plan for repeat EGD on Monday 6/9 after patient has had multiple days of clear liquid diet to avoid food products obscuring visualization  - Miralax QD  - Can trial reglan 4 mg given that this is her home regimen  - Keep on clear liquid diet  - NPO after midnight on 6/8 for EGD  - CBC, CMP at 4 AM 6/9.    Discussed plan in detail w/ patient at bedside who is in agreement w/ plan.     Tito Marshall  GI/Hepatology Fellow, PGY-4    MONDAY-FRIDAY 8AM-5PM:  Please message via Viki or email [email or web address]     On Weekends/Holidays (All day) and Weekdays after 5 PM to 8 AM  For nonurgent consults please email:  Please email [email or web address]  For urgent consults:  Please contact on call GI team. See Amion schedule ([facility]), Ischemix paging system ([facility]), or call hospital  ([facility])

## 2025-06-07 DIAGNOSIS — R19.7 DIARRHEA, UNSPECIFIED: ICD-10-CM

## 2025-06-07 DIAGNOSIS — E83.51 HYPOCALCEMIA: ICD-10-CM

## 2025-06-07 DIAGNOSIS — M81.0 AGE-RELATED OSTEOPOROSIS WITHOUT CURRENT PATHOLOGICAL FRACTURE: ICD-10-CM

## 2025-06-07 LAB
24R-OH-CALCIDIOL SERPL-MCNC: 31 NG/ML — SIGNIFICANT CHANGE UP
ALBUMIN SERPL ELPH-MCNC: 3.6 G/DL — SIGNIFICANT CHANGE UP (ref 3.3–5)
ALP SERPL-CCNC: 54 U/L — SIGNIFICANT CHANGE UP (ref 40–120)
ALT FLD-CCNC: 19 U/L — SIGNIFICANT CHANGE UP (ref 10–45)
ANION GAP SERPL CALC-SCNC: 12 MMOL/L — SIGNIFICANT CHANGE UP (ref 5–17)
ANION GAP SERPL CALC-SCNC: 12 MMOL/L — SIGNIFICANT CHANGE UP (ref 5–17)
AST SERPL-CCNC: 24 U/L — SIGNIFICANT CHANGE UP (ref 10–40)
BILIRUB SERPL-MCNC: 0.8 MG/DL — SIGNIFICANT CHANGE UP (ref 0.2–1.2)
BUN SERPL-MCNC: 6 MG/DL — LOW (ref 7–23)
BUN SERPL-MCNC: 7 MG/DL — SIGNIFICANT CHANGE UP (ref 7–23)
CALCIUM SERPL-MCNC: 7.2 MG/DL — LOW (ref 8.4–10.5)
CALCIUM SERPL-MCNC: 8 MG/DL — LOW (ref 8.4–10.5)
CHLORIDE SERPL-SCNC: 109 MMOL/L — HIGH (ref 96–108)
CHLORIDE SERPL-SCNC: 110 MMOL/L — HIGH (ref 96–108)
CO2 SERPL-SCNC: 21 MMOL/L — LOW (ref 22–31)
CO2 SERPL-SCNC: 21 MMOL/L — LOW (ref 22–31)
CREAT SERPL-MCNC: 0.6 MG/DL — SIGNIFICANT CHANGE UP (ref 0.5–1.3)
CREAT SERPL-MCNC: 0.63 MG/DL — SIGNIFICANT CHANGE UP (ref 0.5–1.3)
EGFR: 89 ML/MIN/1.73M2 — SIGNIFICANT CHANGE UP
EGFR: 89 ML/MIN/1.73M2 — SIGNIFICANT CHANGE UP
EGFR: 90 ML/MIN/1.73M2 — SIGNIFICANT CHANGE UP
EGFR: 90 ML/MIN/1.73M2 — SIGNIFICANT CHANGE UP
GLUCOSE BLDC GLUCOMTR-MCNC: 127 MG/DL — HIGH (ref 70–99)
GLUCOSE SERPL-MCNC: 106 MG/DL — HIGH (ref 70–99)
GLUCOSE SERPL-MCNC: 87 MG/DL — SIGNIFICANT CHANGE UP (ref 70–99)
HCT VFR BLD CALC: 30.6 % — LOW (ref 34.5–45)
HGB BLD-MCNC: 9.6 G/DL — LOW (ref 11.5–15.5)
MAGNESIUM SERPL-MCNC: 1.6 MG/DL — SIGNIFICANT CHANGE UP (ref 1.6–2.6)
MCHC RBC-ENTMCNC: 28 PG — SIGNIFICANT CHANGE UP (ref 27–34)
MCHC RBC-ENTMCNC: 31.4 G/DL — LOW (ref 32–36)
MCV RBC AUTO: 89.2 FL — SIGNIFICANT CHANGE UP (ref 80–100)
NRBC BLD AUTO-RTO: 0 /100 WBCS — SIGNIFICANT CHANGE UP (ref 0–0)
PHOSPHATE SERPL-MCNC: 2 MG/DL — LOW (ref 2.5–4.5)
PLATELET # BLD AUTO: 224 K/UL — SIGNIFICANT CHANGE UP (ref 150–400)
POTASSIUM SERPL-MCNC: 3.4 MMOL/L — LOW (ref 3.5–5.3)
POTASSIUM SERPL-MCNC: 3.4 MMOL/L — LOW (ref 3.5–5.3)
POTASSIUM SERPL-SCNC: 3.4 MMOL/L — LOW (ref 3.5–5.3)
POTASSIUM SERPL-SCNC: 3.4 MMOL/L — LOW (ref 3.5–5.3)
PROT SERPL-MCNC: 5.8 G/DL — LOW (ref 6–8.3)
PTH-INTACT FLD-MCNC: 202 PG/ML — HIGH (ref 15–65)
RBC # BLD: 3.43 M/UL — LOW (ref 3.8–5.2)
RBC # FLD: 16.6 % — HIGH (ref 10.3–14.5)
SODIUM SERPL-SCNC: 142 MMOL/L — SIGNIFICANT CHANGE UP (ref 135–145)
SODIUM SERPL-SCNC: 143 MMOL/L — SIGNIFICANT CHANGE UP (ref 135–145)
T4 FREE SERPL-MCNC: 1.1 NG/DL — SIGNIFICANT CHANGE UP (ref 0.9–1.8)
TSH SERPL-MCNC: 5.26 UIU/ML — HIGH (ref 0.27–4.2)
WBC # BLD: 2.57 K/UL — LOW (ref 3.8–10.5)
WBC # FLD AUTO: 2.57 K/UL — LOW (ref 3.8–10.5)

## 2025-06-07 PROCEDURE — 93010 ELECTROCARDIOGRAM REPORT: CPT

## 2025-06-07 PROCEDURE — 99223 1ST HOSP IP/OBS HIGH 75: CPT

## 2025-06-07 RX ORDER — ERGOCALCIFEROL 1.25 MG/1
50000 CAPSULE ORAL
Refills: 0 | Status: DISCONTINUED | OUTPATIENT
Start: 2025-06-07 | End: 2025-06-09

## 2025-06-07 RX ORDER — CALCIUM CARBONATE 750 MG/1
1 TABLET ORAL
Refills: 0 | Status: DISCONTINUED | OUTPATIENT
Start: 2025-06-07 | End: 2025-06-09

## 2025-06-07 RX ORDER — CALCIUM GLUCONATE 20 MG/ML
1 INJECTION, SOLUTION INTRAVENOUS ONCE
Refills: 0 | Status: COMPLETED | OUTPATIENT
Start: 2025-06-07 | End: 2025-06-07

## 2025-06-07 RX ORDER — MAGNESIUM SULFATE 500 MG/ML
2 SYRINGE (ML) INJECTION ONCE
Refills: 0 | Status: COMPLETED | OUTPATIENT
Start: 2025-06-07 | End: 2025-06-07

## 2025-06-07 RX ADMIN — CALCIUM GLUCONATE 100 GRAM(S): 20 INJECTION, SOLUTION INTRAVENOUS at 09:08

## 2025-06-07 RX ADMIN — SODIUM CHLORIDE 70 MILLILITER(S): 9 INJECTION, SOLUTION INTRAVENOUS at 21:18

## 2025-06-07 RX ADMIN — SODIUM CHLORIDE 70 MILLILITER(S): 9 INJECTION, SOLUTION INTRAVENOUS at 18:38

## 2025-06-07 RX ADMIN — Medication 3 MILLIGRAM(S): at 21:17

## 2025-06-07 RX ADMIN — Medication 10 MILLIGRAM(S): at 21:17

## 2025-06-07 RX ADMIN — CALCIUM CARBONATE 1 TABLET(S): 750 TABLET ORAL at 11:04

## 2025-06-07 RX ADMIN — Medication 40 MILLIGRAM(S): at 12:38

## 2025-06-07 RX ADMIN — Medication 25 GRAM(S): at 12:38

## 2025-06-07 NOTE — ADVANCED PRACTICE NURSE CONSULT - REASON FOR CONSULT
Vascular Access Team    Evaluation for: MIDLINE placement at bedside   Indication for MIDLINE: IV Access  Requested by name: Laz Phillip (06-Jun-2025 19:07)    Allergy to CHG/Heparin.Lidocaine: PCNs, sulfa drugs    Platelets(>20): 224  INR(<3): 1.13  eGFR(>40): 89  Pending blood cultures: N/A  IR or Nephrology or ID clearance needed: no    Anticoagulants: no   UE DVT: no  Mastectomy: no  Fistula: no    Plan: Bedside midline order evaluated.   Please call VAT RN at [phone] with any questions.

## 2025-06-07 NOTE — PROGRESS NOTE ADULT - SUBJECTIVE AND OBJECTIVE BOX
date of service: 06-07-25 @ 07:46  [facility]  REVIEW OF SYSTEMS:  CONSTITUTIONAL: No fever,  no  weight loss  ENT:  No  tinnitus,   no   vertigo  NECK: No pain or stiffness  RESPIRATORY: No cough, wheezing, chills or hemoptysis;    No Shortness of Breath  CARDIOVASCULAR: No chest pain, palpitations, dizziness  GASTROINTESTINAL: No abdominal or epigastric pain. No nausea, vomiting, or hematemesis; No diarrhea  No melena or hematochezia.  GENITOURINARY: No dysuria, frequency, hematuria, or incontinence  NEUROLOGICAL: No headaches  SKIN: No itching,  no   rash  LYMPH Nodes: No enlarged glands  ENDOCRINE: No heat or cold intolerance  MUSCULOSKELETAL: No joint pain or swelling  PSYCHIATRIC: No depression, anxiety  HEME/LYMPH: No easy bruising, or bleeding gums  ALLERGY AND IMMUNOLOGIC: No hives or eczema	    MEDICATIONS  (STANDING):  calcium carbonate   1250 mG (OsCal) 1 Tablet(s) Oral daily  calcium gluconate IVPB 1 Gram(s) IV Intermittent once  dextrose 5% + sodium chloride 0.9%. 1000 milliLiter(s) (70 mL/Hr) IV Continuous <Continuous>  pantoprazole  Injectable 40 milliGRAM(s) IV Push daily  polyethylene glycol 3350 17 Gram(s) Oral two times a day  simvastatin 10 milliGRAM(s) Oral at bedtime    MEDICATIONS  (PRN):  melatonin 3 milliGRAM(s) Oral at bedtime PRN Insomnia  ondansetron Injectable 4 milliGRAM(s) IV Push three times a day PRN Nausea and/or Vomiting      Vital Signs Last 24 Hrs  T(C): 36.8 (07 Jun 2025 04:43), Max: 36.8 (07 Jun 2025 04:43)  T(F): 98.2 (07 Jun 2025 04:43), Max: 98.2 (07 Jun 2025 04:43)  HR: 76 (07 Jun 2025 04:43) (72 - 76)  BP: 108/62 (07 Jun 2025 04:43) (104/60 - 115/67)  BP(mean): --  RR: 18 (07 Jun 2025 04:43) (18 - 18)  SpO2: 96% (07 Jun 2025 04:43) (96% - 100%)    Parameters below as of 07 Jun 2025 04:43  Patient On (Oxygen Delivery Method): room air      CAPILLARY BLOOD GLUCOSE        I&O's Summary    06 Jun 2025 07:01  -  07 Jun 2025 07:00  --------------------------------------------------------  IN: 720 mL / OUT: 0 mL / NET: 720 mL          Appearance: Normal	  HEENT:   Normal oral mucosa, PERRL, EOMI	  Lymphatic: No lymphadenopathy  Cardiovascular: Normal S1 S2, No JVD  Respiratory: Lungs clear to auscultation	  Gastrointestinal:  Soft, Non-tender, + BS	  Skin: No rash, No ecchymoses	  Extremities:     LABS:                        8.9    3.36  )-----------( 192      ( 06 Jun 2025 07:23 )             27.7     06-06    142  |  111[H]  |  8   ----------------------------<  123[H]  3.5   |  19[L]  |  0.62    Ca    7.7[L]      06 Jun 2025 19:49  Phos  1.9     06-06  Mg     1.7     06-06            Urinalysis Basic - ( 06 Jun 2025 19:49 )    Color: x / Appearance: x / SG: x / pH: x  Gluc: 123 mg/dL / Ketone: x  / Bili: x / Urobili: x   Blood: x / Protein: x / Nitrite: x   Leuk Esterase: x / RBC: x / WBC x   Sq Epi: x / Non Sq Epi: x / Bacteria: x                      Consultant(s) Notes Reviewed:      Care Discussed with Consultants/Other Providers:

## 2025-06-07 NOTE — CONSULT NOTE ADULT - CONSULT REASON
dark stools
fistulae/ shunt
Evaluate shunt between the inferior mesenteric vein and IVC.
Hypocalcemia
abnormal vascular variant on CT

## 2025-06-07 NOTE — ADVANCED PRACTICE NURSE CONSULT - REASON FOR CONSULT
Midline Catheter Insertion Note- Placed 6/7/25    Catheter type: 4F  : Bard  Power Injectable: Yes  Ref#: [id number]  Procedure assisted by: cruz marsh RN    Time out was preformed, confirming the patient's first and last name, date of birth, procedure, and correct site prior to state of procedure.  Patient was placed with HOB 30 degrees. Patient placement site was prepped with chlorhexidine solution, then draped using maximum sterile barrier protection. Using the Bard Site Rite 8, the catheter was placed using the Modified Seldinger Technique. The area was injected with 2 ml of 1% lidocaine. Using the ultrasound, the catheter was introduced. Strict adherence to outline aseptic technique including handwashing, glove and gown, utilizing mask and cap, plus draping the patient with a sterile drape was observed. Upon completion of line placement, the insertion site was covered with a sterile occlusive CHG dressing. Pt tolerated procedure well.   All materials used for catheter insertion, including the intact guide wires, were accounted for at the end of the procedure. Patient educated on midline care.     Number of attempts:1  Complications/Comments: no    Anatomical Site of insertion: left basilic vein  Catheter size/length: 4fr 20cm   US guided Bard SL Power MIDLINE placed

## 2025-06-07 NOTE — PROGRESS NOTE ADULT - ASSESSMENT
80-year-old f      h/o  right   breast Ca s/p  RT,  HTN,  remote hx PUD, remote hx SBO  s/p SBR 1987,,.  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 Roscoe Lorenzo  /  [facility] , with  very narrowed proximal duodenum with debris in esophagus     had   CT  a/p  for   weight loss,  on 5/30, . duodenal narrowing with upstream dilation     then,  was  at [facility] on Friday after  egd,  for dizziness/fatigue and received IV fluids after which the dark stools started. /  denies  cp/sob/  abd pain      melena/  dizziness     s/p  prbc/.  seen by house  gi on arrival     prior  ct,  c/c pseudo obstruction, small intestine .  and  has   c/c  abd  distension   HLD,  on lipitor     yesterday,  with loud  volume, pt  stated she has  fired  2 gi consultants  , and  both  RN  and  nurse   manager  were  present in room    CT   a/p,  c/c  patulous, dilated  esophagus and  ileum,  no obstruction,  shunt between inf mesenteric v  and IVC.     vasc  following /  discussed  with vacs attending  dr oro, this  am, no  intervention  planned       incidental findings   on ct.  likely  from prior . distant  bowel surgery     has  exertional sob,   card  f/p.  ha s no chf      egd,  duodenal  structure,  with    food    ct  a/p  ,  with  po  contrast, prelim report  noted    per  gi, rpt  egd  on monday/  keep on clears,    hypocalcemia,  seen by  matthew ch, ?  etiology    card matt peacock    dvt  ppx          rad< from: CT Abdomen and Pelvis w/ IV Cont (06.03.25 @ 11:59) >  IMPRESSION:  1. Persistent markedly dilated/patulous proximal duodenum, esophagus, and   distal ileum, similar to prior study suggestive of an underlying   functional etiology. No evidence of obstruction.  2. Low-attenuation stool is noted throughout the colon, which can be seen   with steatorrhea. Clinical correlation is suggested.  3. Incidental shunt between the inferior mesenteric vein and IVC. The   duodenal narrowing occurs as it drapes along the anterior aspect of the   shunt.  --- End of Report ---          <

## 2025-06-07 NOTE — CONSULT NOTE ADULT - ATTENDING COMMENTS
Agree with above. Patient with reported debris filled stomach on EGD outpatient, unable to see duodenum. CT reportedly shows possible obstruction as outpatient - if unable to obtain records, repeat CT A/P to evaluate for site of obstruction before potential repeat EGD under general anesthesia.
imaging reviewed, case discussed w providers  pt w imv to ivc shunt  I do not think this can cause duodenal obstruction    rec dw IR, hepatology if needs treatment
The patient is a 80yFemale with PMH of breast CA, HTN, PUD, SBO, osteoporosis on Prolia, a/w black stools and dizziness. Endocrinology consulted for hypocalcemia.    Patient with hypocalcemia with appropriately elevated PTH and otherwise normal vitD level. Recent prolia use could elevate PTH, but given normal vitD and renal function, not likely the etiology. Patient with diarrhea and not been tolerating calcium carbonate supplement for which could cause GI loss of calcium. Will start Tums to replete calcium and supportive measures for diarrhea. Also monitor Mag level and replete as needed.    Complex patient high level decision making.

## 2025-06-07 NOTE — CONSULT NOTE ADULT - ASSESSMENT
The patient is a 80yFemale with PMH of breast CA, HTN, PUD, SBO, osteoporosis on Prolia, a/w black stools and dizziness. Endocrinology consulted for hypocalcemia.    #Hypocalcemia  Corrected Ca 8.3 -> 6.2 -> 7.7 -> 7.5 today  DDx includes diarrhea (pt reports significant diarrhea yesterday), recent Prolia, vitamin D deficiency, malabsorption, hypoparathyroidism  - followup PTH, vitamin D, TSH  - keep magnesium repleted >2, as hypoMg promotes PTH resistance and impairs secretion  - trend calcium, magnesium, and phosphorous q8-12h for now, plus daily CMP for albumin  - start calciuum carbonate (Tums) 500mg (200mg elemental calcium) twice daily with meals  - continue home ergocalciferol 50k units twice weekly on sunday and wednesdays  - give calcium guconate 1g IV as needed for corrected calcium <7 or symptomatic hypocalcemia  - check EKG and monitor on telemetry      Garrett Curry MD  Endocrine Fellow  For nonurgent matters, please email [email or web address] or [email or web address]. For urgent matters only, please call answering service at [phone] (weekdays), [phone] (nights/weekends).  The patient is a 80yFemale with PMH of breast CA, HTN, PUD, SBO, osteoporosis on Prolia, a/w black stools and dizziness. Endocrinology consulted for hypocalcemia.    #Hypocalcemia  Corrected Ca 8.3 -> 6.2 -> 7.7 -> 7.5 today  DDx includes diarrhea (pt reports significant diarrhea yesterday), recent Prolia, vitamin D deficiency, malabsorption, hypoparathyroidism  - followup PTH, vitamin D, TSH  - keep magnesium repleted >2, as hypoMg promotes PTH resistance and impairs secretion  - trend calcium, magnesium, and phosphorous q8-12h for now, plus daily CMP for albumin  - start calcium carbonate (Tums) 500mg (200mg elemental calcium) twice daily with meals  - continue home ergocalciferol 50k units twice weekly on sunday and wednesdays  - give calcium guconate 1g IV as needed for corrected calcium <7 or symptomatic hypocalcemia  - check EKG and monitor on telemetry      Garrett Curry MD  Endocrine Fellow  For nonurgent matters, please email [email or web address] or [email or web address]. For urgent matters only, please call answering service at [phone] (weekdays), [phone] (nights/weekends).  The patient is a 80yFemale with PMH of breast CA, HTN, PUD, SBO, osteoporosis on Prolia, a/w black stools and dizziness. Endocrinology consulted for hypocalcemia.    #Hypocalcemia  Corrected Ca 8.3 -> 6.2 -> 7.7 -> 7.5 today  DDx includes diarrhea (pt reports significant diarrhea yesterday), recent Prolia, vitamin D deficiency, malabsorption, hypoparathyroidism  - followup PTH, vitamin D, TSH  - keep magnesium repleted >2, as hypoMg promotes PTH resistance and impairs secretion  - trend calcium, magnesium, and phosphorous q8-12h for now, plus daily CMP for albumin  - start calcium carbonate (Tums) 500mg (200mg elemental calcium) twice daily with meals  - if vitamin D normal, can continue home ergocalciferol 50k units twice weekly on sunday and wednesdays  - give calcium guconate 1g IV as needed for corrected calcium <7 or symptomatic hypocalcemia  - check EKG and monitor on telemetry      Garrett Curry MD  Endocrine Fellow  For nonurgent matters, please email [email or web address] or [email or web address]. For urgent matters only, please call answering service at [phone] (weekdays), [phone] (nights/weekends).  The patient is a 80yFemale with PMH of breast CA, HTN, PUD, SBO, osteoporosis on Prolia, a/w black stools and dizziness. Endocrinology consulted for hypocalcemia.    #Hypocalcemia  Corrected Ca 8.3 -> 6.2 -> 7.7 -> 7.5 today  DDx includes diarrhea (pt reports significant diarrhea yesterday), recent Prolia, vitamin D deficiency, malabsorption, hypoparathyroidism  - followup PTH, vitamin D, TSH  - keep magnesium repleted >2, as hypoMg promotes PTH resistance and impairs secretion  - trend calcium, magnesium, and phosphorous q8-12h for now, plus daily CMP for albumin  - start calcium carbonate (Tums) 500mg (200mg elemental calcium) twice daily with meals  - if vitamin D normal, can continue home ergocalciferol 50k units twice weekly on sunday and wednesdays  - supportive care for diarrhea  - give calcium guconate 1g IV as needed for corrected calcium <7 or symptomatic hypocalcemia  - check EKG and monitor on telemetry      Garrett Curry MD  Endocrine Fellow  For nonurgent matters, please email [email or web address] or [email or web address]. For urgent matters only, please call answering service at [phone] (weekdays), [phone] (nights/weekends).  The patient is a 80yFemale with PMH of breast CA, HTN, PUD, SBO, osteoporosis on Prolia, a/w black stools and dizziness. Endocrinology consulted for hypocalcemia.    #Hypocalcemia  Corrected Ca 8.3 -> 6.2 -> 7.7 -> 7.5 today  GFR 89, Mg 1.6, phos 2.0  DDx includes diarrhea (pt reports significant diarrhea yesterday), recent Prolia, vitamin D deficiency, malabsorption, hypoparathyroidism  - followup PTH, vitamin D, TSH  - keep magnesium repleted >2, as hypoMg promotes PTH resistance and impairs secretion  - trend calcium, magnesium, and phosphorous q8-12h for now, plus daily CMP for albumin  - start calcium carbonate (Tums) 500mg (200mg elemental calcium) twice daily with meals. Patient states she cannot tolerate non-chewable calcium carbonate.  - if vitamin D normal, can continue home ergocalciferol 50k units twice weekly on sunday and wednesdays  - supportive care for diarrhea  - give calcium guconate 1g IV as needed for corrected calcium <7 or symptomatic hypocalcemia  - check EKG and monitor on telemetry      Garrett Curry MD  Endocrine Fellow  For nonurgent matters, please email [email or web address] or [email or web address]. For urgent matters only, please call answering service at [phone] (weekdays), [phone] (nights/weekends).

## 2025-06-07 NOTE — PROGRESS NOTE ADULT - SUBJECTIVE AND OBJECTIVE BOX
Date of Service: 06-07-25 @ 07:17           CARDIOLOGY     PROGRESS  NOTE   ________________________________________________  CHIEF COMPLAINT:Patient is a 80y old  Female who presents with a chief complaint of dizziness/  melena (06 Jun 2025 11:46)  comfortable  	  REVIEW OF SYSTEMS:  CONSTITUTIONAL: No fever, weight loss, or fatigue  EYES: No eye pain, visual disturbances, or discharge  ENT:  No difficulty hearing, tinnitus, vertigo; No sinus or throat pain  NECK: No pain or stiffness  RESPIRATORY: No cough, wheezing, chills or hemoptysis; No Shortness of Breath  CARDIOVASCULAR: No chest pain, palpitations, passing out, dizziness, or leg swelling  GASTROINTESTINAL:+ abdominal or epigastric pain. No nausea, vomiting, or hematemesis; No diarrhea or constipation. No melena or hematochezia.  GENITOURINARY: No dysuria, frequency, hematuria, or incontinence  NEUROLOGICAL: No headaches, memory loss, loss of strength, numbness, or tremors  SKIN: No itching, burning, rashes, or lesions   LYMPH Nodes: No enlarged glands  ENDOCRINE: No heat or cold intolerance; No hair loss  MUSCULOSKELETAL: No joint pain or swelling; No muscle, back, or extremity pain  PSYCHIATRIC: No depression, anxiety, mood swings, or difficulty sleeping  HEME/LYMPH: No easy bruising, or bleeding gums  ALLERGY AND IMMUNOLOGIC: No hives or eczema	    [ x] All others negative	  [ ] Unable to obtain    PHYSICAL EXAM:  T(C): 36.8 (06-07-25 @ 04:43), Max: 36.8 (06-07-25 @ 04:43)  HR: 76 (06-07-25 @ 04:43) (72 - 76)  BP: 108/62 (06-07-25 @ 04:43) (104/60 - 115/67)  RR: 18 (06-07-25 @ 04:43) (18 - 18)  SpO2: 96% (06-07-25 @ 04:43) (96% - 100%)  Wt(kg): --  I&O's Summary    06 Jun 2025 07:01  -  07 Jun 2025 07:00  --------------------------------------------------------  IN: 720 mL / OUT: 0 mL / NET: 720 mL        Appearance: Normal	  HEENT:   Normal oral mucosa, PERRL, EOMI	  Lymphatic: No lymphadenopathy  Cardiovascular: Normal S1 S2, No JVD, + murmurs, No edema  Respiratory: Lungs clear to auscultation	  Psychiatry: A & O x 3, Mood & affect appropriate  Gastrointestinal:  Soft, Non-tender, + BS	  Skin: No rashes, No ecchymoses, No cyanosis	  Neurologic: Non-focal  Extremities: Normal range of motion, No clubbing, cyanosis or edema  Vascular: Peripheral pulses palpable 2+ bilaterally    MEDICATIONS  (STANDING):  calcium carbonate   1250 mG (OsCal) 1 Tablet(s) Oral daily  dextrose 5% + sodium chloride 0.9%. 1000 milliLiter(s) (70 mL/Hr) IV Continuous <Continuous>  pantoprazole  Injectable 40 milliGRAM(s) IV Push daily  polyethylene glycol 3350 17 Gram(s) Oral two times a day  simvastatin 10 milliGRAM(s) Oral at bedtime      TELEMETRY: 	    ECG:  	  RADIOLOGY:  OTHER: 	  	  LABS:	 	    CARDIAC MARKERS:                            8.9    3.36  )-----------( 192      ( 06 Jun 2025 07:23 )             27.7     06-06    142  |  111[H]  |  8   ----------------------------<  123[H]  3.5   |  19[L]  |  0.62    Ca    7.7[L]      06 Jun 2025 19:49  Phos  1.9     06-06  Mg     1.7     06-06      proBNP:   Lipid Profile:   HgA1c:   TSH:     - Will plan for repeat EGD on Monday 6/9 after patient has had multiple days of clear liquid diet to avoid food products obscuring visualization  - Miralax QD  - Can trial reglan 4 mg given that this is her home regimen  - Keep on clear liquid diet  - NPO after midnight on 6/8 for EGD    < from: TTE W or WO Ultrasound Enhancing Agent (06.06.25 @ 12:36) >   1. Left ventricular cavity is normal in size. Left ventricular wall thickness is normal. Left ventricular systolic function is normal with an ejection fraction of 62 % by Owens's method of disks. There are no regional wall motion abnormalities seen.   2. Normal left ventricular diastolic function, with normal left ventricular filling pressure.   3. Normal right ventricular cavity size, with normal wall thickness, and normal right ventricular systolic function. Tricuspid annular plane systolic excursion (TAPSE) is 1.8 cm (normal >=1.7 cm).   4. No pericardial effusion seen.   5. Left ventricular global longitudinal strain is -25.5 % which is normal (< -18%). Images were acquired on a [employer] ultrasound system and processed using Drivr strain analysis software with a heart rate of 77 bpm and a blood pressure of 104/60 mmHg.      Assessment and plan  ---------------------------  80-year-old female PMHx GERD, HTN, chronic idiopathic pseudoobstruction of intestine presents ED complaining of black stools since this morning.  Patient states she had endoscopy this past Friday with her GI (Dr. Roscoe Lorenzo) and states she was told "area between stomach and esophagus was very narrow, could not pass tube".  Additionally of note she had been fasting prior to endoscopy, morning of procedure did have 2 episodes of witnessed syncope without head trauma, did endorse preceding lightheadedness prior to both episodes.  Feeling well since endoscopy until today, noted black stools with her bowel movement which was new, called GI was advised to come to ED.  Does endorse some mild fatigue.  At this time denies chest pain, shortness of breath, dizziness, abdominal pain.  pt with exertional sob , no known hx of CAD, will get reports from DR Draper  ct scan noted with evidence of shunt may explain sob  check echo  will discuss with  GI  repeat ecg  check orthostatic bp  egd noted   will ask  Dr Draper to get the copy of her cardiac testing  dvt prophylaxis  ct scan noted with sig cardiomegaly, will check echo  gi ?surgery comments   echo noted normal EF   awaiting EGD on Monday

## 2025-06-07 NOTE — CONSULT NOTE ADULT - SUBJECTIVE AND OBJECTIVE BOX
HPI:  The patient is a 80yFemale with PMH of breast CA, HTN, PUD, SBO, osteoporosis on Prolia, a/w black stools and dizziness. Endocrinology consulted for hypocalcemia.      ENDOCRINE HX:    Patient follows outpatient w/ Dr. Diana for osteoporosis.  S/p Reclast in 2012, more recently on Prolia since 2017.   Last Prolia dose 3/13/2025.  Takes vitamin D 50k units twice weekly (sunday and wednesday)  Labs from 1/2025 w/ vitamin D 45, normal.  Patient did have hypocalcemia in 2023, i/s/o sepsis.  No hx parathyroid disorders or family hx calcium disorders.   Of note patient reports significant diarrhea yesterday.  No symptoms of hypocalcemia.    PAST MEDICAL & SURGICAL HISTORY:  Chronic idiopathic pseudo-obstruction of intestine  Breast cancer  HLD (hyperlipidemia)  GERD (gastroesophageal reflux disease)  Osteoporosis  S/P small bowel resection      FAMILY HISTORY: no fhx calcium disorders      HOME MEDS:  ergocalciferol 1.25 mg (50,000 intl units) oral tablet: 1 tab(s) orally 2 times a week sunday and wednesday  methenamine hippurate 1 g oral tablet: 1 tab(s) orally once a day  MiraLax oral powder for reconstitution: 17 gram(s) orally once a day  miSOPROStol 200 mcg oral tablet: 1 tab(s) orally once a day  pantoprazole 40 mg oral delayed release tablet: 1 tab(s) orally once a day  Prolia 60 mg/mL subcutaneous solution: 60 milligram(s) subcutaneously every 6 months  Reglan 10 mg oral tablet: 2 tab(s) orally 2 times a day  simvastatin 10 mg oral tablet: 1 tab(s) orally once a day (at bedtime)      MEDICATIONS  (STANDING):  dextrose 5% + sodium chloride 0.9%. 1000 milliLiter(s) (70 mL/Hr) IV Continuous <Continuous>  pantoprazole  Injectable 40 milliGRAM(s) IV Push daily  polyethylene glycol 3350 17 Gram(s) Oral two times a day  simvastatin 10 milliGRAM(s) Oral at bedtime    MEDICATIONS  (PRN):  melatonin 3 milliGRAM(s) Oral at bedtime PRN Insomnia  ondansetron Injectable 4 milliGRAM(s) IV Push three times a day PRN Nausea and/or Vomiting      Allergies    penicillins (Unknown)  sulfa drugs (Hives)    Intolerances      Review of Systems:  Constitutional: No fever  Eyes: No blurry vision  Neuro: No tremors  HEENT: No pain  Cardiovascular: No chest pain, palpitations  Respiratory: No SOB, no cough  GI: + diarrhea  : No dysuria  Skin: no rash  Psych: no depression  Endocrine: no polyuria, polydipsia  Hem/lymph: no swelling  Osteoporosis: no fractures    ALL OTHER SYSTEMS REVIEWED AND NEGATIVE    PHYSICAL EXAM:  VITALS: T(C): 36.7 (06-07-25 @ 07:49)  T(F): 98 (06-07-25 @ 07:49), Max: 98.2 (06-07-25 @ 04:43)  HR: 72 (06-07-25 @ 07:49) (72 - 76)  BP: 108/60 (06-07-25 @ 07:49) (104/60 - 115/67)  RR:  (18 - 18)  SpO2:  (96% - 100%)  Wt(kg): --  GENERAL: NAD, well-groomed, well-developed  EYES: No proptosis, no lid lag, anicteric  HEENT:  Atraumatic, Normocephalic, moist mucous membranes  RESPIRATORY: Normal respiratory effort; no audible wheezing  SKIN: Dry, intact, No rashes or lesions  MUSCULOSKELETAL: Full range of motion, normal strength  NEURO: sensation intact, extraocular movements intact, no tremor  PSYCH: Alert and oriented x 3, normal affect, normal mood  CUSHING'S SIGNS: no striae      CAPILLARY BLOOD GLUCOSE                                9.6    2.57  )-----------( 224      ( 07 Jun 2025 07:12 )             30.6       06-07    143  |  110[H]  |  7   ----------------------------<  87  3.4[L]   |  21[L]  |  0.63    eGFR: 89    Ca    7.2[L]      06-07  Mg     1.6     06-07  Phos  2.0     06-07    TPro  5.8[L]  /  Alb  3.6  /  TBili  0.8  /  DBili  x   /  AST  24  /  ALT  19  /  AlkPhos  54  06-07      Thyroid Function Tests:              Radiology:

## 2025-06-08 LAB
ALBUMIN SERPL ELPH-MCNC: 3.4 G/DL — SIGNIFICANT CHANGE UP (ref 3.3–5)
ALP SERPL-CCNC: 55 U/L — SIGNIFICANT CHANGE UP (ref 40–120)
ALT FLD-CCNC: 20 U/L — SIGNIFICANT CHANGE UP (ref 10–45)
ANION GAP SERPL CALC-SCNC: 13 MMOL/L — SIGNIFICANT CHANGE UP (ref 5–17)
ANION GAP SERPL CALC-SCNC: 13 MMOL/L — SIGNIFICANT CHANGE UP (ref 5–17)
AST SERPL-CCNC: 19 U/L — SIGNIFICANT CHANGE UP (ref 10–40)
BILIRUB SERPL-MCNC: 0.7 MG/DL — SIGNIFICANT CHANGE UP (ref 0.2–1.2)
BUN SERPL-MCNC: 5 MG/DL — LOW (ref 7–23)
BUN SERPL-MCNC: 6 MG/DL — LOW (ref 7–23)
CALCIUM SERPL-MCNC: 7.6 MG/DL — LOW (ref 8.4–10.5)
CALCIUM SERPL-MCNC: 8.5 MG/DL — SIGNIFICANT CHANGE UP (ref 8.4–10.5)
CHLORIDE SERPL-SCNC: 108 MMOL/L — SIGNIFICANT CHANGE UP (ref 96–108)
CHLORIDE SERPL-SCNC: 111 MMOL/L — HIGH (ref 96–108)
CO2 SERPL-SCNC: 20 MMOL/L — LOW (ref 22–31)
CO2 SERPL-SCNC: 20 MMOL/L — LOW (ref 22–31)
CREAT SERPL-MCNC: 0.59 MG/DL — SIGNIFICANT CHANGE UP (ref 0.5–1.3)
CREAT SERPL-MCNC: 0.59 MG/DL — SIGNIFICANT CHANGE UP (ref 0.5–1.3)
EGFR: 90 ML/MIN/1.73M2 — SIGNIFICANT CHANGE UP
GLUCOSE SERPL-MCNC: 103 MG/DL — HIGH (ref 70–99)
GLUCOSE SERPL-MCNC: 105 MG/DL — HIGH (ref 70–99)
MAGNESIUM SERPL-MCNC: 2 MG/DL — SIGNIFICANT CHANGE UP (ref 1.6–2.6)
MAGNESIUM SERPL-MCNC: 2.1 MG/DL — SIGNIFICANT CHANGE UP (ref 1.6–2.6)
PHOSPHATE SERPL-MCNC: 2.1 MG/DL — LOW (ref 2.5–4.5)
PHOSPHATE SERPL-MCNC: 2.1 MG/DL — LOW (ref 2.5–4.5)
POTASSIUM SERPL-MCNC: 3.7 MMOL/L — SIGNIFICANT CHANGE UP (ref 3.5–5.3)
POTASSIUM SERPL-MCNC: 3.7 MMOL/L — SIGNIFICANT CHANGE UP (ref 3.5–5.3)
POTASSIUM SERPL-SCNC: 3.7 MMOL/L — SIGNIFICANT CHANGE UP (ref 3.5–5.3)
POTASSIUM SERPL-SCNC: 3.7 MMOL/L — SIGNIFICANT CHANGE UP (ref 3.5–5.3)
PROT SERPL-MCNC: 5.7 G/DL — LOW (ref 6–8.3)
SODIUM SERPL-SCNC: 141 MMOL/L — SIGNIFICANT CHANGE UP (ref 135–145)
SODIUM SERPL-SCNC: 144 MMOL/L — SIGNIFICANT CHANGE UP (ref 135–145)

## 2025-06-08 RX ORDER — CALCIUM GLUCONATE 20 MG/ML
1 INJECTION, SOLUTION INTRAVENOUS ONCE
Refills: 0 | Status: COMPLETED | OUTPATIENT
Start: 2025-06-08 | End: 2025-06-08

## 2025-06-08 RX ORDER — POTASSIUM PHOSPHATE, MONOBASIC POTASSIUM PHOSPHATE, DIBASIC INJECTION, 236; 224 MG/ML; MG/ML
15 SOLUTION, CONCENTRATE INTRAVENOUS ONCE
Refills: 0 | Status: COMPLETED | OUTPATIENT
Start: 2025-06-08 | End: 2025-06-08

## 2025-06-08 RX ADMIN — CALCIUM CARBONATE 1 TABLET(S): 750 TABLET ORAL at 05:12

## 2025-06-08 RX ADMIN — Medication 1 APPLICATION(S): at 10:00

## 2025-06-08 RX ADMIN — CALCIUM CARBONATE 1 TABLET(S): 750 TABLET ORAL at 16:55

## 2025-06-08 RX ADMIN — Medication 10 MILLIGRAM(S): at 21:09

## 2025-06-08 RX ADMIN — Medication 40 MILLIGRAM(S): at 08:41

## 2025-06-08 RX ADMIN — SODIUM CHLORIDE 70 MILLILITER(S): 9 INJECTION, SOLUTION INTRAVENOUS at 08:41

## 2025-06-08 RX ADMIN — CALCIUM GLUCONATE 100 GRAM(S): 20 INJECTION, SOLUTION INTRAVENOUS at 08:40

## 2025-06-08 RX ADMIN — Medication 3 MILLIGRAM(S): at 21:09

## 2025-06-08 RX ADMIN — POTASSIUM PHOSPHATE, MONOBASIC POTASSIUM PHOSPHATE, DIBASIC INJECTION, 62.5 MILLIMOLE(S): 236; 224 SOLUTION, CONCENTRATE INTRAVENOUS at 10:00

## 2025-06-08 RX ADMIN — ERGOCALCIFEROL 50000 UNIT(S): 1.25 CAPSULE ORAL at 08:40

## 2025-06-08 RX ADMIN — SODIUM CHLORIDE 70 MILLILITER(S): 9 INJECTION, SOLUTION INTRAVENOUS at 21:10

## 2025-06-08 NOTE — PROGRESS NOTE ADULT - ASSESSMENT
80-year-old f      h/o  right   breast Ca s/p  RT,  HTN,  remote hx PUD, remote hx SBO  s/p SBR 1987,,.  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 Roscoe Lorenzo  /  [facility] , with  very narrowed proximal duodenum with debris in esophagus     had   CT  a/p  for   weight loss,  on 5/30, . duodenal narrowing with upstream dilation     then,  was  at [facility] on Friday after  egd,  for dizziness/fatigue and received IV fluids after which the dark stools started. /  denies  cp/sob/  abd pain      melena/  dizziness     s/p  prbc/.  seen by house  gi on arrival     prior  ct,  c/c pseudo obstruction, small intestine .  and  has   c/c  abd  distension   HLD,  on lipitor     yesterday,  with loud  volume, pt  stated she has  fired  2 gi consultants  , and  both  RN  and  nurse   manager  were  present in room    CT   a/p,  c/c  patulous, dilated  esophagus and  ileum,  no obstruction,  shunt between inf mesenteric v  and IVC.     vasc  following /  discussed  with vacs attending  dr oro, this  am, no  intervention  planned       incidental findings   on ct.  likely  from prior . distant  bowel surgery     has  exertional sob,   card  f/p.  ha s no chf      egd,  duodenal  structure,  with    food    ct  a/p  ,  with  po  contrast, prelim report  noted    per  gi, rpt  egd  on monday/  keep on clears,    hypocalcemia,  seen by  hous=e  endo, ?  etiology/  awiat  egd  in  am    juana peacock    dvt  ppx          rad< from: CT Abdomen and Pelvis w/ IV Cont (06.03.25 @ 11:59) >  IMPRESSION:  1. Persistent markedly dilated/patulous proximal duodenum, esophagus, and   distal ileum, similar to prior study suggestive of an underlying   functional etiology. No evidence of obstruction.  2. Low-attenuation stool is noted throughout the colon, which can be seen   with steatorrhea. Clinical correlation is suggested.  3. Incidental shunt between the inferior mesenteric vein and IVC. The   duodenal narrowing occurs as it drapes along the anterior aspect of the   shunt.  --- End of Report ---          <

## 2025-06-08 NOTE — CHART NOTE - NSCHARTNOTEFT_GEN_A_CORE
The patient is a 80yFemale with PMH of breast CA, HTN, PUD, SBO, osteoporosis on Prolia, a/w black stools and dizziness. Endocrinology consulted for hypocalcemia.    #Hypocalcemia  Corrected Ca 8.3 -> 6.2 -> 7.7 -> 7.5 -> 8.0 today. Only 1 dose of PO calcium given yesterday  GFR 90, Mg 2.1, phos 2.1  , appropriately high  Vitamin D 31, normal  TSH 5.26, free T4 1.1, normal for age  Hypocalcemia likely 2/2 diarrhea (pt reports significant diarrhea now improving) and malabsorption i/s/o GI pathology  PLAN:  - keep magnesium repleted >2, as hypoMg promotes PTH resistance and impairs secretion  - trend CMP for calcium, plus magnesium and phosphorous daily   - continue calcium carbonate (Tums) 500mg (200mg elemental calcium) twice daily with meals. Patient states she cannot tolerate non-chewable calcium carbonate.  - continue home ergocalciferol 50k units twice weekly on sunday and wednesdays  - supportive care for diarrhea  - give calcium guconate 1g IV as needed for corrected calcium <7 or symptomatic hypocalcemia    Garrett Curry MD  Endocrine Fellow  For nonurgent matters, please email [email or web address] or [email or web address]. For urgent matters only, please call answering service at [phone] (weekdays), [phone] (nights/weekends). The patient is a 80yFemale with PMH of breast CA, HTN, PUD, SBO, osteoporosis on Prolia, a/w black stools and dizziness. Endocrinology consulted for hypocalcemia.    #Hypocalcemia  Corrected Ca 8.3 -> 6.2 -> 7.7 -> 7.5 -> 8.0 today. Only 1 dose of PO calcium given yesterday  GFR 90, Mg 2.1, phos 2.1  , appropriately high  Vitamin D 31, normal  TSH 5.26, free T4 1.1, normal for age  Hypocalcemia likely 2/2 diarrhea (pt reports significant diarrhea now improving) and malabsorption i/s/o GI pathology  PLAN:  - keep magnesium repleted >2, as hypoMg promotes PTH resistance and impairs secretion  - trend CMP for calcium, plus magnesium and phosphorous daily   - continue calcium carbonate (Tums) 500mg (200mg elemental calcium) twice daily with meals. Patient states she cannot tolerate non-chewable calcium carbonate.  - continue home ergocalciferol 50k units twice weekly on sunday and wednesdays  - supportive care for diarrhea  - give calcium guconate 1g IV as needed for corrected calcium <7 or symptomatic hypocalcemia  - monitor on tele until calcium normal    Garrett Curry MD  Endocrine Fellow  For nonurgent matters, please email [email or web address] or [email or web address]. For urgent matters only, please call answering service at [phone] (weekdays), [phone] (nights/weekends).

## 2025-06-08 NOTE — PROGRESS NOTE ADULT - SUBJECTIVE AND OBJECTIVE BOX
Date of Service: 06-08-25 @ 10:22           CARDIOLOGY     PROGRESS  NOTE   ________________________________________________  CHIEF COMPLAINT:Patient is a 80y old  Female who presents with a chief complaint of dizziness/  melena (08 Jun 2025 08:13)  no complain  	  REVIEW OF SYSTEMS:  CONSTITUTIONAL: No fever, weight loss, or fatigue  EYES: No eye pain, visual disturbances, or discharge  ENT:  No difficulty hearing, tinnitus, vertigo; No sinus or throat pain  NECK: No pain or stiffness  RESPIRATORY: No cough, wheezing, chills or hemoptysis; No Shortness of Breath  CARDIOVASCULAR: No chest pain, palpitations, passing out, dizziness, or leg swelling  GASTROINTESTINAL: mmild abdominal or epigastric pain. No nausea, vomiting, or hematemesis; No diarrhea or constipation. No melena or hematochezia.  GENITOURINARY: No dysuria, frequency, hematuria, or incontinence  NEUROLOGICAL: No headaches, memory loss, loss of strength, numbness, or tremors  SKIN: No itching, burning, rashes, or lesions   LYMPH Nodes: No enlarged glands  ENDOCRINE: No heat or cold intolerance; No hair loss  MUSCULOSKELETAL: No joint pain or swelling; No muscle, back, or extremity pain  PSYCHIATRIC: No depression, anxiety, mood swings, or difficulty sleeping  HEME/LYMPH: No easy bruising, or bleeding gums  ALLERGY AND IMMUNOLOGIC: No hives or eczema	    [ x] All others negative	  [ ] Unable to obtain    PHYSICAL EXAM:  T(C): 36.6 (06-08-25 @ 04:52), Max: 36.7 (06-07-25 @ 15:18)  HR: 66 (06-08-25 @ 04:52) (66 - 74)  BP: 130/67 (06-08-25 @ 04:52) (117/61 - 130/67)  RR: 18 (06-08-25 @ 04:52) (18 - 18)  SpO2: 96% (06-08-25 @ 04:52) (95% - 97%)  Wt(kg): --  I&O's Summary    07 Jun 2025 07:01  -  08 Jun 2025 07:00  --------------------------------------------------------  IN: 720 mL / OUT: 0 mL / NET: 720 mL        Appearance: Normal	  HEENT:   Normal oral mucosa, PERRL, EOMI	  Lymphatic: No lymphadenopathy  Cardiovascular: Normal S1 S2, No JVD,+murmurs, No edema  Respiratory: Lungs clear to auscultation	  Psychiatry: A & O x 3, Mood & affect appropriate  Gastrointestinal: + mildly distended -tender, + BS	  Skin: No rashes, No ecchymoses, No cyanosis	  Neurologic: Non-focal  Extremities: Normal range of motion, No clubbing, cyanosis or edema  Vascular: Peripheral pulses palpable 2+ bilaterally    MEDICATIONS  (STANDING):  calcium carbonate    500 mG (Tums) Chewable 1 Tablet(s) Chew two times a day  chlorhexidine 2% Cloths 1 Application(s) Topical daily  dextrose 5% + sodium chloride 0.9%. 1000 milliLiter(s) (70 mL/Hr) IV Continuous <Continuous>  ergocalciferol 17913 Unit(s) Oral <User Schedule>  pantoprazole  Injectable 40 milliGRAM(s) IV Push daily  polyethylene glycol 3350 17 Gram(s) Oral two times a day  simvastatin 10 milliGRAM(s) Oral at bedtime      TELEMETRY: 	    ECG:  	  RADIOLOGY:  OTHER: 	  	  LABS:	 	    CARDIAC MARKERS:                          9.6    2.57  )-----------( 224      ( 07 Jun 2025 07:12 )             30.6     06-08    144  |  111[H]  |  6[L]  ----------------------------<  105[H]  3.7   |  20[L]  |  0.59    Ca    7.6[L]      08 Jun 2025 07:08  Phos  2.1     06-08  Mg     2.1     06-08    TPro  5.7[L]  /  Alb  3.4  /  TBili  0.7  /  DBili  x   /  AST  19  /  ALT  20  /  AlkPhos  55  06-08    proBNP:   Lipid Profile:   HgA1c:   TSH: Thyroid Stimulating Hormone, Serum: 5.26 uIU/mL (06-07 @ 07:10)      Comprehensive Metabolic Panel in AM (06.08.25 @ 07:08)    Calcium: 7.6 mg/dL    < from: 12 Lead ECG (06.02.25 @ 15:35) >  Ventricular Rate 73 BPM    Atrial Rate 73 BPM    P-R Interval 170 ms    QRS Duration 78 ms    Q-T Interval 366 ms    QTC Calculation(Bazett) 403 ms    P Axis 57 degrees    R Axis 21 degrees    T Axis 44 degrees    Diagnosis Line NORMAL SINUS RHYTHM  NORMAL ECG  WHEN COMPARED WITH ECG OF 10-APR-2024 10:09,  NO SIGNIFICANT CHANGE WAS FOUND      Assessment and plan  ---------------------------  80-year-old female PMHx GERD, HTN, chronic idiopathic pseudoobstruction of intestine presents ED complaining of black stools since this morning.  Patient states she had endoscopy this past Friday with her GI (Dr. Roscoe Lorenzo) and states she was told "area between stomach and esophagus was very narrow, could not pass tube".  Additionally of note she had been fasting prior to endoscopy, morning of procedure did have 2 episodes of witnessed syncope without head trauma, did endorse preceding lightheadedness prior to both episodes.  Feeling well since endoscopy until today, noted black stools with her bowel movement which was new, called GI was advised to come to ED.  Does endorse some mild fatigue.  At this time denies chest pain, shortness of breath, dizziness, abdominal pain.  pt with exertional sob , no known hx of CAD, will get reports from DR Draper  ct scan noted with evidence of shunt may explain sob  check echo  will discuss with  GI  check orthostatic bp  egd noted   will ask  Dr Draper to get the copy of her cardiac testing  dvt prophylaxis  ct scan noted with sig cardiomegaly, will check echo  gi ?surgery comments   echo noted normal EF   awaiting EGD on Monday, increase calcium supplement, check PTH level

## 2025-06-08 NOTE — CHART NOTE - NSCHARTNOTEFT_GEN_A_CORE
NPO after midnight for EGD  labs - cbc, bmp, mg and phos    Onyinye Ugonabo, PGY4  Gastroenterology and Hepatology  Available on TEAMS    For non urgent consult, please email [email or web address] (For [place]) or [email or web address] (For [place])  Long range page number [phone]. Short range [id number] NPO after midnight for EGD  labs - cbc, bmp, mg and phos    replace electrolytes    Onyinye Ugonabo, PGY4  Gastroenterology and Hepatology  Available on TEAMS    For non urgent consult, please email [email or web address] ([facility] or [email or web address] (For [place])  Long range page number [phone]. Short range [phone]

## 2025-06-08 NOTE — CHART NOTE - NSCHARTNOTESELECT_GEN_ALL_CORE
Event Note
endocrine/Event Note
Event Note
Event Note
GI fellow/Event Note
GOC/Event Note
Gastroenterology/Event Note
endocrine/Event Note

## 2025-06-08 NOTE — PROGRESS NOTE ADULT - SUBJECTIVE AND OBJECTIVE BOX
date of service: 06-08-25 @ 08:13  [facility]  REVIEW OF SYSTEMS:  CONSTITUTIONAL: No fever,  no  weight loss  ENT:  No  tinnitus,   no   vertigo  NECK: No pain or stiffness  RESPIRATORY: No cough, wheezing, chills or hemoptysis;    No Shortness of Breath  CARDIOVASCULAR: No chest pain, palpitations, dizziness  GASTROINTESTINAL: No abdominal or epigastric pain. No nausea, vomiting, or hematemesis; No diarrhea  No melena or hematochezia.  GENITOURINARY: No dysuria, frequency, hematuria, or incontinence  NEUROLOGICAL: No headaches  SKIN: No itching,  no   rash  LYMPH Nodes: No enlarged glands  ENDOCRINE: No heat or cold intolerance  MUSCULOSKELETAL: No joint pain or swelling  PSYCHIATRIC: No depression, anxiety  HEME/LYMPH: No easy bruising, or bleeding gums  ALLERGY AND IMMUNOLOGIC: No hives or eczema	    MEDICATIONS  (STANDING):  calcium carbonate    500 mG (Tums) Chewable 1 Tablet(s) Chew two times a day  calcium gluconate IVPB 1 Gram(s) IV Intermittent once  dextrose 5% + sodium chloride 0.9%. 1000 milliLiter(s) (70 mL/Hr) IV Continuous <Continuous>  ergocalciferol 31090 Unit(s) Oral <User Schedule>  pantoprazole  Injectable 40 milliGRAM(s) IV Push daily  polyethylene glycol 3350 17 Gram(s) Oral two times a day  potassium phosphate IVPB 15 milliMole(s) IV Intermittent once  simvastatin 10 milliGRAM(s) Oral at bedtime    MEDICATIONS  (PRN):  melatonin 3 milliGRAM(s) Oral at bedtime PRN Insomnia  ondansetron Injectable 4 milliGRAM(s) IV Push three times a day PRN Nausea and/or Vomiting      Vital Signs Last 24 Hrs  T(C): 36.6 (08 Jun 2025 04:52), Max: 36.7 (07 Jun 2025 15:18)  T(F): 97.9 (08 Jun 2025 04:52), Max: 98 (07 Jun 2025 15:18)  HR: 66 (08 Jun 2025 04:52) (66 - 74)  BP: 130/67 (08 Jun 2025 04:52) (117/61 - 130/67)  BP(mean): --  RR: 18 (08 Jun 2025 04:52) (18 - 18)  SpO2: 96% (08 Jun 2025 04:52) (95% - 97%)    Parameters below as of 08 Jun 2025 04:52  Patient On (Oxygen Delivery Method): room air      CAPILLARY BLOOD GLUCOSE      POCT Blood Glucose.: 127 mg/dL (07 Jun 2025 16:57)    I&O's Summary    07 Jun 2025 07:01  -  08 Jun 2025 07:00  --------------------------------------------------------  IN: 720 mL / OUT: 0 mL / NET: 720 mL          Appearance: Normal	  HEENT:   Normal oral mucosa, PERRL, EOMI	  Lymphatic: No lymphadenopathy  Cardiovascular: Normal S1 S2, No JVD  Respiratory: Lungs clear to auscultation	  Gastrointestinal:  Soft, Non-tender, + BS	  Skin: No rash, No ecchymoses	  Extremities:     LABS:                        9.6    2.57  )-----------( 224      ( 07 Jun 2025 07:12 )             30.6     06-08    144  |  111[H]  |  6[L]  ----------------------------<  105[H]  3.7   |  20[L]  |  0.59    Ca    7.6[L]      08 Jun 2025 07:08  Phos  2.1     06-08  Mg     2.1     06-08    TPro  5.7[L]  /  Alb  3.4  /  TBili  0.7  /  DBili  x   /  AST  19  /  ALT  20  /  AlkPhos  55  06-08          Urinalysis Basic - ( 08 Jun 2025 07:08 )    Color: x / Appearance: x / SG: x / pH: x  Gluc: 105 mg/dL / Ketone: x  / Bili: x / Urobili: x   Blood: x / Protein: x / Nitrite: x   Leuk Esterase: x / RBC: x / WBC x   Sq Epi: x / Non Sq Epi: x / Bacteria: x              Thyroid Stimulating Hormone, Serum: 5.26 uIU/mL (06-07 @ 07:10)          Consultant(s) Notes Reviewed:      Care Discussed with Consultants/Other Providers:

## 2025-06-09 ENCOUNTER — TRANSCRIPTION ENCOUNTER (OUTPATIENT)
Age: 80
End: 2025-06-09

## 2025-06-09 VITALS — DIASTOLIC BLOOD PRESSURE: 70 MMHG | SYSTOLIC BLOOD PRESSURE: 113 MMHG | HEART RATE: 73 BPM

## 2025-06-09 LAB
ALBUMIN SERPL ELPH-MCNC: 3.6 G/DL — SIGNIFICANT CHANGE UP (ref 3.3–5)
ALP SERPL-CCNC: 58 U/L — SIGNIFICANT CHANGE UP (ref 40–120)
ALT FLD-CCNC: 20 U/L — SIGNIFICANT CHANGE UP (ref 10–45)
ANION GAP SERPL CALC-SCNC: 10 MMOL/L — SIGNIFICANT CHANGE UP (ref 5–17)
AST SERPL-CCNC: 19 U/L — SIGNIFICANT CHANGE UP (ref 10–40)
BILIRUB SERPL-MCNC: 0.7 MG/DL — SIGNIFICANT CHANGE UP (ref 0.2–1.2)
BUN SERPL-MCNC: 5 MG/DL — LOW (ref 7–23)
CALCIUM SERPL-MCNC: 8.3 MG/DL — LOW (ref 8.4–10.5)
CHLORIDE SERPL-SCNC: 110 MMOL/L — HIGH (ref 96–108)
CO2 SERPL-SCNC: 23 MMOL/L — SIGNIFICANT CHANGE UP (ref 22–31)
CREAT SERPL-MCNC: 0.58 MG/DL — SIGNIFICANT CHANGE UP (ref 0.5–1.3)
EGFR: 91 ML/MIN/1.73M2 — SIGNIFICANT CHANGE UP
EGFR: 91 ML/MIN/1.73M2 — SIGNIFICANT CHANGE UP
GLUCOSE SERPL-MCNC: 106 MG/DL — HIGH (ref 70–99)
HCT VFR BLD CALC: 32.3 % — LOW (ref 34.5–45)
HGB BLD-MCNC: 10.2 G/DL — LOW (ref 11.5–15.5)
MCHC RBC-ENTMCNC: 27.7 PG — SIGNIFICANT CHANGE UP (ref 27–34)
MCHC RBC-ENTMCNC: 31.6 G/DL — LOW (ref 32–36)
MCV RBC AUTO: 87.8 FL — SIGNIFICANT CHANGE UP (ref 80–100)
NRBC BLD AUTO-RTO: 0 /100 WBCS — SIGNIFICANT CHANGE UP (ref 0–0)
PLATELET # BLD AUTO: 218 K/UL — SIGNIFICANT CHANGE UP (ref 150–400)
POTASSIUM SERPL-MCNC: 3.9 MMOL/L — SIGNIFICANT CHANGE UP (ref 3.5–5.3)
POTASSIUM SERPL-SCNC: 3.9 MMOL/L — SIGNIFICANT CHANGE UP (ref 3.5–5.3)
PROT SERPL-MCNC: 5.8 G/DL — LOW (ref 6–8.3)
RBC # BLD: 3.68 M/UL — LOW (ref 3.8–5.2)
RBC # FLD: 16 % — HIGH (ref 10.3–14.5)
SODIUM SERPL-SCNC: 143 MMOL/L — SIGNIFICANT CHANGE UP (ref 135–145)
WBC # BLD: 3.13 K/UL — LOW (ref 3.8–10.5)
WBC # FLD AUTO: 3.13 K/UL — LOW (ref 3.8–10.5)

## 2025-06-09 PROCEDURE — 84439 ASSAY OF FREE THYROXINE: CPT

## 2025-06-09 PROCEDURE — 83735 ASSAY OF MAGNESIUM: CPT

## 2025-06-09 PROCEDURE — P9016: CPT

## 2025-06-09 PROCEDURE — 82306 VITAMIN D 25 HYDROXY: CPT

## 2025-06-09 PROCEDURE — C1889: CPT

## 2025-06-09 PROCEDURE — 86850 RBC ANTIBODY SCREEN: CPT

## 2025-06-09 PROCEDURE — 36415 COLL VENOUS BLD VENIPUNCTURE: CPT

## 2025-06-09 PROCEDURE — 82803 BLOOD GASES ANY COMBINATION: CPT

## 2025-06-09 PROCEDURE — 96375 TX/PRO/DX INJ NEW DRUG ADDON: CPT

## 2025-06-09 PROCEDURE — 93005 ELECTROCARDIOGRAM TRACING: CPT

## 2025-06-09 PROCEDURE — 85610 PROTHROMBIN TIME: CPT

## 2025-06-09 PROCEDURE — 86900 BLOOD TYPING SEROLOGIC ABO: CPT

## 2025-06-09 PROCEDURE — C1751: CPT

## 2025-06-09 PROCEDURE — 36430 TRANSFUSION BLD/BLD COMPNT: CPT

## 2025-06-09 PROCEDURE — 84443 ASSAY THYROID STIM HORMONE: CPT

## 2025-06-09 PROCEDURE — 84295 ASSAY OF SERUM SODIUM: CPT

## 2025-06-09 PROCEDURE — 44360 SMALL BOWEL ENDOSCOPY: CPT | Mod: GC

## 2025-06-09 PROCEDURE — 82947 ASSAY GLUCOSE BLOOD QUANT: CPT

## 2025-06-09 PROCEDURE — 83605 ASSAY OF LACTIC ACID: CPT

## 2025-06-09 PROCEDURE — 84132 ASSAY OF SERUM POTASSIUM: CPT

## 2025-06-09 PROCEDURE — 96374 THER/PROPH/DIAG INJ IV PUSH: CPT

## 2025-06-09 PROCEDURE — 85018 HEMOGLOBIN: CPT

## 2025-06-09 PROCEDURE — 82435 ASSAY OF BLOOD CHLORIDE: CPT

## 2025-06-09 PROCEDURE — 84100 ASSAY OF PHOSPHORUS: CPT

## 2025-06-09 PROCEDURE — 36569 INSJ PICC 5 YR+ W/O IMAGING: CPT

## 2025-06-09 PROCEDURE — 82962 GLUCOSE BLOOD TEST: CPT

## 2025-06-09 PROCEDURE — 93356 MYOCRD STRAIN IMG SPCKL TRCK: CPT

## 2025-06-09 PROCEDURE — 97161 PT EVAL LOW COMPLEX 20 MIN: CPT

## 2025-06-09 PROCEDURE — 82330 ASSAY OF CALCIUM: CPT

## 2025-06-09 PROCEDURE — 85025 COMPLETE CBC W/AUTO DIFF WBC: CPT

## 2025-06-09 PROCEDURE — 85730 THROMBOPLASTIN TIME PARTIAL: CPT

## 2025-06-09 PROCEDURE — 71046 X-RAY EXAM CHEST 2 VIEWS: CPT

## 2025-06-09 PROCEDURE — 80053 COMPREHEN METABOLIC PANEL: CPT

## 2025-06-09 PROCEDURE — 85014 HEMATOCRIT: CPT

## 2025-06-09 PROCEDURE — 83970 ASSAY OF PARATHORMONE: CPT

## 2025-06-09 PROCEDURE — 74176 CT ABD & PELVIS W/O CONTRAST: CPT

## 2025-06-09 PROCEDURE — 85027 COMPLETE CBC AUTOMATED: CPT

## 2025-06-09 PROCEDURE — 86923 COMPATIBILITY TEST ELECTRIC: CPT

## 2025-06-09 PROCEDURE — 80048 BASIC METABOLIC PNL TOTAL CA: CPT

## 2025-06-09 PROCEDURE — 99285 EMERGENCY DEPT VISIT HI MDM: CPT

## 2025-06-09 PROCEDURE — 86901 BLOOD TYPING SEROLOGIC RH(D): CPT

## 2025-06-09 PROCEDURE — 93306 TTE W/DOPPLER COMPLETE: CPT

## 2025-06-09 PROCEDURE — 99232 SBSQ HOSP IP/OBS MODERATE 35: CPT

## 2025-06-09 PROCEDURE — 74177 CT ABD & PELVIS W/CONTRAST: CPT

## 2025-06-09 RX ORDER — METHENAMINE MANDELATE 1 G
1 TABLET ORAL
Refills: 0 | DISCHARGE

## 2025-06-09 RX ORDER — CALCIUM CARBONATE 750 MG/1
1 TABLET ORAL
Qty: 60 | Refills: 0
Start: 2025-06-09 | End: 2025-07-08

## 2025-06-09 RX ADMIN — CALCIUM CARBONATE 1 TABLET(S): 750 TABLET ORAL at 05:16

## 2025-06-09 RX ADMIN — SODIUM CHLORIDE 70 MILLILITER(S): 9 INJECTION, SOLUTION INTRAVENOUS at 08:02

## 2025-06-09 RX ADMIN — Medication 1 APPLICATION(S): at 11:01

## 2025-06-09 RX ADMIN — Medication 40 MILLIGRAM(S): at 11:02

## 2025-06-09 RX ADMIN — CALCIUM CARBONATE 1 TABLET(S): 750 TABLET ORAL at 16:56

## 2025-06-09 NOTE — PROGRESS NOTE ADULT - ASSESSMENT
The patient is a 80yFemale with PMH of breast CA, HTN, PUD, SBO, osteoporosis on Prolia, a/w black stools and dizziness. Endocrinology consulted for hypocalcemia.    #Hypocalcemia  Corrected Ca 8.3 -> 6.2 -> 7.7 -> 7.5 -> 8.6 today  Likely secondary to diarrhea  PTH appropriately elevated  PLAN:  - No endocrine contraindications for discharge.  Can continue Tums one tab twice daily with food on discharge, home dose vitamin D  Routine follow up with her endocrinologist Dr. Diana. Next appointment scheduled for October 1 at 11:15    Discussed with attending physician.  Sandeep Chase DO   PGY-4 Endocrine Fellow  Can be reached via [employer] Teams.    For follow up questions, discharge recommendations or new consults, please email [email or web address] ([facility]) or [email or web address] ([facility]) or call the answering service at [phone] (weekdays); [phone] (nights/weekends).   For emergencies, please page fellow on call.

## 2025-06-09 NOTE — PRE PROCEDURE NOTE - PRE PROCEDURE EVALUATION
Attending Physician: Dr Cornelius                             Procedure: EGD     Indication for Procedure: melena   ________________________________________________________  PAST MEDICAL & SURGICAL HISTORY:  Chronic idiopathic pseudo-obstruction of intestine      Breast cancer      HLD (hyperlipidemia)      GERD (gastroesophageal reflux disease)      Osteoporosis      S/P small bowel resection        ALLERGIES:  penicillins (Unknown)  sulfa drugs (Hives)    HOME MEDICATIONS:  ergocalciferol 1.25 mg (50,000 intl units) oral tablet: 1 tab(s) orally 2 times a week sunday and wednesday  methenamine hippurate 1 g oral tablet: 1 tab(s) orally once a day  MiraLax oral powder for reconstitution: 17 gram(s) orally once a day  miSOPROStol 200 mcg oral tablet: 1 tab(s) orally once a day  pantoprazole 40 mg oral delayed release tablet: 1 tab(s) orally once a day  Prolia 60 mg/mL subcutaneous solution: 60 milligram(s) subcutaneously every 6 months  Reglan 10 mg oral tablet: 2 tab(s) orally 2 times a day  simvastatin 10 mg oral tablet: 1 tab(s) orally once a day (at bedtime)    AICD/PPM: [ ] yes   [x] no    PERTINENT LAB DATA:                        10.2   3.13  )-----------( 218      ( 09 Jun 2025 04:55 )             32.3     06-09    143  |  110[H]  |  5[L]  ----------------------------<  106[H]  3.9   |  23  |  0.58    Ca    8.3[L]      09 Jun 2025 04:55  Phos  2.1     06-08  Mg     2.0     06-08    TPro  5.8[L]  /  Alb  3.6  /  TBili  0.7  /  DBili  x   /  AST  19  /  ALT  20  /  AlkPhos  58  06-09                PHYSICAL EXAMINATION:    T(C): 36.5  HR: 78  BP: 153/69  RR: 16  SpO2: 98%    Constitutional: NAD    Neck:  No JVD  Respiratory: normal effort   Cardiovascular: RRR  Extremities: No peripheral edema  Neurological: A/O x 3, no focal deficits        COMMENTS:    The patient is a suitable candidate for the planned procedure unless box checked [ ]  No, explain:     Attending Physician: Dr Cornelius                             Procedure: EGD     Indication for Procedure: Abnormal CT  ________________________________________________________  PAST MEDICAL & SURGICAL HISTORY:  Chronic idiopathic pseudo-obstruction of intestine      Breast cancer      HLD (hyperlipidemia)      GERD (gastroesophageal reflux disease)      Osteoporosis      S/P small bowel resection        ALLERGIES:  penicillins (Unknown)  sulfa drugs (Hives)    HOME MEDICATIONS:  ergocalciferol 1.25 mg (50,000 intl units) oral tablet: 1 tab(s) orally 2 times a week sunday and wednesday  methenamine hippurate 1 g oral tablet: 1 tab(s) orally once a day  MiraLax oral powder for reconstitution: 17 gram(s) orally once a day  miSOPROStol 200 mcg oral tablet: 1 tab(s) orally once a day  pantoprazole 40 mg oral delayed release tablet: 1 tab(s) orally once a day  Prolia 60 mg/mL subcutaneous solution: 60 milligram(s) subcutaneously every 6 months  Reglan 10 mg oral tablet: 2 tab(s) orally 2 times a day  simvastatin 10 mg oral tablet: 1 tab(s) orally once a day (at bedtime)    AICD/PPM: [ ] yes   [x] no    PERTINENT LAB DATA:                        10.2   3.13  )-----------( 218      ( 09 Jun 2025 04:55 )             32.3     06-09    143  |  110[H]  |  5[L]  ----------------------------<  106[H]  3.9   |  23  |  0.58    Ca    8.3[L]      09 Jun 2025 04:55  Phos  2.1     06-08  Mg     2.0     06-08    TPro  5.8[L]  /  Alb  3.6  /  TBili  0.7  /  DBili  x   /  AST  19  /  ALT  20  /  AlkPhos  58  06-09                PHYSICAL EXAMINATION:    T(C): 36.5  HR: 78  BP: 153/69  RR: 16  SpO2: 98%    Constitutional: NAD    Neck:  No JVD  Respiratory: normal effort   Cardiovascular: RRR  Extremities: No peripheral edema  Neurological: A/O x 3, no focal deficits        COMMENTS:    The patient is a suitable candidate for the planned procedure unless box checked [ ]  No, explain:

## 2025-06-09 NOTE — DISCHARGE NOTE PROVIDER - NSDCCPCAREPLAN_GEN_ALL_CORE_FT
PRINCIPAL DISCHARGE DIAGNOSIS  Diagnosis: Anemia due to acute blood loss  Assessment and Plan of Treatment: Received a unit of blood during your admission. Hgb now stable. EGD 6/4 demonstrating patulous esophagus as well as dilated 2nd portion of the duodenum w/ significant food and bile - as such unable to visualize duodenal narrowing despite multiple maneuvers including food removal w/ mejia net and excavator. Nonbleeding duodenal ulcer - possible source of recent GI bleeding, though no blood throughout upper GI tract.  Status post EGD on 6/9 after several days on liquid diet, no food contents within the duodenum, no mases, no bleeds. non bleeding ulcer present. cleared for discharge with outpatient follow up.      SECONDARY DISCHARGE DIAGNOSES  Diagnosis: Hypocalcemia  Assessment and Plan of Treatment: continue with tums (calcium carbonate) twice a day with meals. and follow up with endocrinology. corrected calcium levels now improved. likely low in setting of diarrhea.

## 2025-06-09 NOTE — DISCHARGE NOTE NURSING/CASE MANAGEMENT/SOCIAL WORK - FINANCIAL ASSISTANCE
[facility] provides services at a reduced cost to those who are determined to be eligible through [facility]’s financial assistance program. Information regarding [facility]’s financial assistance program can be found by going to [email or web address] or by calling [phone].

## 2025-06-09 NOTE — DISCHARGE NOTE PROVIDER - CARE PROVIDER_API CALL
Clay Draper  Cardiovascular Disease  [address], [address]  [place] [address]  Phone: [phone]  Fax: [phone]  Follow Up Time:     Dr. Nova,   gastroenterology  Phone: (   )    -  Fax: (   )    -  Follow Up Time:

## 2025-06-09 NOTE — PROGRESS NOTE ADULT - SUBJECTIVE AND OBJECTIVE BOX
ENDOCRINE FOLLOW UP     Chief Complaint: abdominal pain  Endocrine consulted for hypocalcemia  History: Patient seen and examined after endoscopy. No acute complaints. Wants to go home.     MEDICATIONS  (STANDING):  calcium carbonate    500 mG (Tums) Chewable 1 Tablet(s) Chew two times a day  chlorhexidine 2% Cloths 1 Application(s) Topical daily  dextrose 5% + sodium chloride 0.9%. 1000 milliLiter(s) (70 mL/Hr) IV Continuous <Continuous>  ergocalciferol 16391 Unit(s) Oral <User Schedule>  pantoprazole  Injectable 40 milliGRAM(s) IV Push daily  polyethylene glycol 3350 17 Gram(s) Oral two times a day  simvastatin 10 milliGRAM(s) Oral at bedtime  sodium chloride 0.9%. 500 milliLiter(s) (30 mL/Hr) IV Continuous <Continuous>    MEDICATIONS  (PRN):  melatonin 3 milliGRAM(s) Oral at bedtime PRN Insomnia  ondansetron Injectable 4 milliGRAM(s) IV Push three times a day PRN Nausea and/or Vomiting      Allergies    penicillins (Unknown)  sulfa drugs (Hives)    Intolerances        ROS: All other systems reviewed and negative    PHYSICAL EXAM:  VITALS: T(C): 36.5 (06-09-25 @ 12:04)  T(F): 97.7 (06-09-25 @ 11:22), Max: 98.2 (06-09-25 @ 11:07)  HR: 73 (06-09-25 @ 15:01) (72 - 98)  BP: 113/70 (06-09-25 @ 15:01) (113/70 - 183/79)  RR:  (16 - 23)  SpO2:  (94% - 100%)  Wt(kg): 56.7 kg  GENERAL: NAD, resting comfortably, bruises on upper extremities   EYES: No proptosis,  anicteric  HEENT:  Atraumatic, Normocephalic, moist mucous membranes  RESPIRATORY: Nonlabored respirations on room air, normal rate/effort   CARDIOVASCULAR: Regular rate and rhythm; no heave  GI: Soft, nontender, non distended  NEURO: Alert and oriented, moves all extremities spontaneously  PSYCH:  reactive affect, euthymic mood    POCT Blood Glucose.: 127 mg/dL (06-07-25 @ 16:57)      06-09    143  |  110[H]  |  5[L]  ----------------------------<  106[H]  3.9   |  23  |  0.58    eGFR: 91    Ca    8.3[L]      06-09  Mg     2.0     06-08  Phos  2.1     06-08    TPro  5.8[L]  /  Alb  3.6  /  TBili  0.7  /  DBili  x   /  AST  19  /  ALT  20  /  AlkPhos  58  06-09          Thyroid Stimulating Hormone, Serum: 5.26 uIU/mL (06-07-25 @ 07:10)

## 2025-06-09 NOTE — PROGRESS NOTE ADULT - REASON FOR ADMISSION
dizziness/  melena

## 2025-06-09 NOTE — DISCHARGE NOTE PROVIDER - PROVIDER TOKENS
PROVIDER:[TOKEN:[[id number]]],FREE:[LAST:[Dr. Nova],PHONE:[(   )    -],FAX:[(   )    -],ADDRESS:[gastroenterology]]

## 2025-06-09 NOTE — DISCHARGE NOTE NURSING/CASE MANAGEMENT/SOCIAL WORK - PATIENT PORTAL LINK FT
You can access the FollowMyHealth Patient Portal offered by [facility] by registering at the following website: [email or web address]. By joining [facility]’s FollowMyHealth portal, you will also be able to view your health information using other applications (apps) compatible with our system.

## 2025-06-09 NOTE — PROGRESS NOTE ADULT - ATTENDING COMMENTS
Agree with above. CT A/P personally reviewed, there is dilation in the distal ileum, unclear if it's due to chronic pseudo-obstruction. 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 If EGD is negative, would obtain vascular surgery evaluation to see if the shunt could be obstructing the duodenum.
Agree with above. We are unable to meaningfully visualize the duodenum and the cause of the distension despite using all endoscopic tools available to try to remove/dislodge the massive amounts of retained food in the massively dilated duodenum. Suspect more likely functional distension/dysmotility rather than mass/stricture, though the latter obviously has to be ruled out. Awaiting imaging with oral contrast - if contrast is able to pass through the duodenum, can keep on clears for a few days to allow passage of food to re-attempt endoscopic evaluation.
Agree with Dr. Connors's assessment and plan as outlined above. Reviewed all pertinent labs, and imaging studies. Modifications made as indicated above. Following this 80 f with history of breast CA, HTN, PUD, SBOP, osteoporosis on prolia  for hypocalcemia. hypocalcemia likely secondary to diarrhea, now improving. Continue with Tums and follow up outpatient. rest of the plan as above.

## 2025-06-09 NOTE — PROGRESS NOTE ADULT - PROVIDER SPECIALTY LIST ADULT
Cardiology
Gastroenterology
Internal Medicine
Internal Medicine
Cardiology
Gastroenterology
Gastroenterology
Cardiology
Endocrinology
Internal Medicine

## 2025-06-09 NOTE — PROGRESS NOTE ADULT - SUBJECTIVE AND OBJECTIVE BOX
Date of Service: 06-09-25 @ 08:07           CARDIOLOGY     PROGRESS  NOTE   ________________________________________________  CHIEF COMPLAINT:Patient is a 80y old  Female who presents with a chief complaint of dizziness/  melena (09 Jun 2025 08:02)  no complain  	  REVIEW OF SYSTEMS:  CONSTITUTIONAL: No fever, weight loss, or fatigue  EYES: No eye pain, visual disturbances, or discharge  ENT:  No difficulty hearing, tinnitus, vertigo; No sinus or throat pain  NECK: No pain or stiffness  RESPIRATORY: No cough, wheezing, chills or hemoptysis; No Shortness of Breath  CARDIOVASCULAR: No chest pain, palpitations, passing out, dizziness, or leg swelling  GASTROINTESTINAL: No abdominal or epigastric pain. No nausea, vomiting, or hematemesis; No diarrhea or constipation. No melena or hematochezia.  GENITOURINARY: No dysuria, frequency, hematuria, or incontinence  NEUROLOGICAL: No headaches, memory loss, loss of strength, numbness, or tremors  SKIN: No itching, burning, rashes, or lesions   LYMPH Nodes: No enlarged glands  ENDOCRINE: No heat or cold intolerance; No hair loss  MUSCULOSKELETAL: No joint pain or swelling; No muscle, back, or extremity pain  PSYCHIATRIC: No depression, anxiety, mood swings, or difficulty sleeping  HEME/LYMPH: No easy bruising, or bleeding gums  ALLERGY AND IMMUNOLOGIC: No hives or eczema	    [x ] All others negative	  [ ] Unable to obtain    PHYSICAL EXAM:  T(C): 36.5 (06-09-25 @ 04:29), Max: 36.5 (06-08-25 @ 11:32)  HR: 72 (06-09-25 @ 04:29) (70 - 80)  BP: 120/58 (06-09-25 @ 04:29) (120/58 - 150/56)  RR: 18 (06-09-25 @ 04:29) (18 - 18)  SpO2: 95% (06-09-25 @ 04:29) (94% - 95%)  Wt(kg): --  I&O's Summary    08 Jun 2025 07:01  -  09 Jun 2025 07:00  --------------------------------------------------------  IN: 2457 mL / OUT: 0 mL / NET: 2457 mL        Appearance: Normal	  HEENT:   Normal oral mucosa, PERRL, EOMI	  Lymphatic: No lymphadenopathy  Cardiovascular: Normal S1 S2, No JVD, + murmurs, No edema  Respiratory: Lungs clear to auscultation	  Psychiatry: A & O x 3, Mood & affect appropriate  Gastrointestinal:  Soft, Non-tender, + BS	  Skin: No rashes, No ecchymoses, No cyanosis	  Neurologic: Non-focal  Extremities: Normal range of motion, No clubbing, cyanosis or edema  Vascular: Peripheral pulses palpable 2+ bilaterally    MEDICATIONS  (STANDING):  calcium carbonate    500 mG (Tums) Chewable 1 Tablet(s) Chew two times a day  chlorhexidine 2% Cloths 1 Application(s) Topical daily  dextrose 5% + sodium chloride 0.9%. 1000 milliLiter(s) (70 mL/Hr) IV Continuous <Continuous>  ergocalciferol 03862 Unit(s) Oral <User Schedule>  pantoprazole  Injectable 40 milliGRAM(s) IV Push daily  polyethylene glycol 3350 17 Gram(s) Oral two times a day  simvastatin 10 milliGRAM(s) Oral at bedtime      TELEMETRY: 	    ECG:  	  RADIOLOGY:  OTHER: 	  	  LABS:	 	    CARDIAC MARKERS:                            10.2   3.13  )-----------( 218      ( 09 Jun 2025 04:55 )             32.3     06-09    143  |  110[H]  |  5[L]  ----------------------------<  106[H]  3.9   |  23  |  0.58    Ca    8.3[L]      09 Jun 2025 04:55  Phos  2.1     06-08  Mg     2.0     06-08    TPro  5.8[L]  /  Alb  3.6  /  TBili  0.7  /  DBili  x   /  AST  19  /  ALT  20  /  AlkPhos  58  06-09    proBNP:   Lipid Profile:   HgA1c:   TSH: Thyroid Stimulating Hormone, Serum: 5.26 uIU/mL (06-07 @ 07:10)    < from: 12 Lead ECG (06.07.25 @ 12:19) >  Diagnosis Line NORMAL SINUS RHYTHM  NORMAL ECG  WHEN COMPARED WITH ECG OF 02-JUN-2025 15:35,  NO SIGNIFICANT CHANGE WAS FOUND      Assessment and plan  ---------------------------  80-year-old female PMHx GERD, HTN, chronic idiopathic pseudoobstruction of intestine presents ED complaining of black stools since this morning.  Patient states she had endoscopy this past Friday with her GI (Dr. Roscoe Lorenzo) and states she was told "area between stomach and esophagus was very narrow, could not pass tube".  Additionally of note she had been fasting prior to endoscopy, morning of procedure did have 2 episodes of witnessed syncope without head trauma, did endorse preceding lightheadedness prior to both episodes.  Feeling well since endoscopy until today, noted black stools with her bowel movement which was new, called GI was advised to come to ED.  Does endorse some mild fatigue.  At this time denies chest pain, shortness of breath, dizziness, abdominal pain.  pt with exertional sob , no known hx of CAD, will get reports from DR Draper  ct scan noted with evidence of shunt may explain sob  check echo  will discuss with  GI  check orthostatic bp  egd noted   will ask  Dr Draper to get the copy of her cardiac testing  dvt prophylaxis  ct scan noted with sig cardiomegaly, will check echo  gi ?surgery comments   echo noted normal EF   awaiting EGD today  increase calcium supplement, check PTH level  increase ambulation  pt is clear cardiac wise for EGD

## 2025-06-09 NOTE — PROGRESS NOTE ADULT - ASSESSMENT
80-year-old f      h/o  right   breast Ca s/p  RT,  HTN,  remote hx PUD, remote hx SBO  s/p SBR 1987,,.  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 Roscoe Lorenzo  /  [facility] , with  very narrowed proximal duodenum with debris in esophagus     had   CT  a/p  for   weight loss,  on 5/30, . duodenal narrowing with upstream dilation     then,  was  at [facility] on Friday after  egd,  for dizziness/fatigue and received IV fluids after which the dark stools started. /  denies  cp/sob/  abd pain      melena/  dizziness     s/p  prbc/.  seen by aguilar  gi on arrival     prior  ct,  c/c pseudo obstruction, small intestine .  and  has   c/c  abd  distension   HLD,  on lipitor     yesterday,  with loud  volume, pt  stated she has  fired  2 gi consultants  , and  both  RN  and  nurse   manager  were  present in room    CT   a/p,  c/c  patulous, dilated  esophagus and  ileum,  no obstruction,  shunt between inf mesenteric v  and IVC.     vasc  following /  discussed  with vacs attending  dr oro, this  am, no  intervention  planned       incidental findings   on ct.  likely  from prior . distant  bowel surgery     has  exertional sob,   card  f/p.  ha s no chf      egd,  duodenal  structure,  with    food    ct  a/p  ,  with  po  contrast, prelim report  noted    per  gi, rpt  egd  on monday/  keep on clears,    hypocalcemia,  seen by  house   endo, ?  etiology/  calkium now  is  8.3      await   egd    today  pe r hous e gi    card d r cuong peacock    dvt  ppx          rad< from: CT Abdomen and Pelvis w/ IV Cont (06.03.25 @ 11:59) >  IMPRESSION:  1. Persistent markedly dilated/patulous proximal duodenum, esophagus, and   distal ileum, similar to prior study suggestive of an underlying   functional etiology. No evidence of obstruction.  2. Low-attenuation stool is noted throughout the colon, which can be seen   with steatorrhea. Clinical correlation is suggested.  3. Incidental shunt between the inferior mesenteric vein and IVC. The   duodenal narrowing occurs as it drapes along the anterior aspect of the   shunt.  --- End of Report ---          <

## 2025-06-09 NOTE — PRE-ANESTHESIA EVALUATION ADULT - NSANTHOSAYNRD_GEN_A_CORE
No. JEET screening performed.  STOP BANG Legend: 0-2 = LOW Risk; 3-4 = INTERMEDIATE Risk; 5-8 = HIGH Risk
No. JEET screening performed.  STOP BANG Legend: 0-2 = LOW Risk; 3-4 = INTERMEDIATE Risk; 5-8 = HIGH Risk

## 2025-06-09 NOTE — DISCHARGE NOTE PROVIDER - HOSPITAL COURSE
HPI:  80-year-old f      h/o   breast Ca s/p  RT,    HTN,  remote hx PUD, remote hx SBO s/p SBR 1987,,  c/c  idiopathic small bowel pseudoobstruction         c/o   black stools x3 days ,  dizziness       egd,   past Friday 5/30 with her GI  Dr. Roscoe Lorenzo  /  [facility] ,  WITH   very narrowed proximal duodenum with debris in esophaguS   had  a CTAP for evaluation of weight loss about 1.5 weeks ago.,  duodenal narrowing with upstream dilation and so then 5/30      was  at [facility] on Friday after her EGD for dizziness/fatigue and received IV fluids after which the dark stools started.      has  mild fatigue and dizziness.  A  denies chest pain, shortness of breath, dizziness, abdominal pain.       (02 Jun 2025 20:10)    Hospital Course:  patient sent by outpatient doctor to hospital for dark stools. received a unit of blood on admission. hx chronic idiopathic pseudoobstruction of intestine. EGD with dilated duodenum with food unable to clear endoscopically. patient continued on liquid diet for a few days. EGd rescheduled to 6/9, patient with evidence of her known chronic idiopathic pseudoobstruction of intestine, no masses, no bleed, no CI to discharge. GI cleared patient to have regular diet and follow up outpatient with GI.  patient also monitored for hypocalcemia. Given tums, improved prior to dc. likely in setting of diarrhea.     patient cleared for discharge.    Important Medication Changes and Reason: see med rec    Active or Pending Issues Requiring Follow-up: GI, pcp    Advanced Directives:   [x] Full code  [ ] DNR  [ ] Hospice    Discharge Diagnoses:  hx chronic idiopathic pseudoobstruction of intestine  gi bleed

## 2025-06-09 NOTE — DISCHARGE NOTE PROVIDER - NSDCMRMEDTOKEN_GEN_ALL_CORE_FT
calcium carbonate 500 mg (200 mg elemental calcium) oral tablet, chewable: 1 tab(s) orally 2 times a day  ergocalciferol 1.25 mg (50,000 intl units) oral tablet: 1 tab(s) orally 2 times a week sunday and wednesday  MiraLax oral powder for reconstitution: 17 gram(s) orally once a day  miSOPROStol 200 mcg oral tablet: 1 tab(s) orally once a day  pantoprazole 40 mg oral delayed release tablet: 1 tab(s) orally once a day  Prolia 60 mg/mL subcutaneous solution: 60 milligram(s) subcutaneously every 6 months  Reglan 10 mg oral tablet: 2 tab(s) orally 2 times a day  simvastatin 10 mg oral tablet: 1 tab(s) orally once a day (at bedtime)

## 2025-06-09 NOTE — PROGRESS NOTE ADULT - SUBJECTIVE AND OBJECTIVE BOX
date of service: 06-09-25 @ 08:02  [facility]  REVIEW OF SYSTEMS:  CONSTITUTIONAL: No fever,  no  weight loss  ENT:  No  tinnitus,   no   vertigo  NECK: No pain or stiffness  RESPIRATORY: No cough, wheezing, chills or hemoptysis;    No Shortness of Breath  CARDIOVASCULAR: No chest pain, palpitations, dizziness  GASTROINTESTINAL: No abdominal or epigastric pain. No nausea, vomiting, or hematemesis; No diarrhea  No melena or hematochezia.  GENITOURINARY: No dysuria, frequency, hematuria, or incontinence  NEUROLOGICAL: No headaches  SKIN: No itching,  no   rash  LYMPH Nodes: No enlarged glands  ENDOCRINE: No heat or cold intolerance  MUSCULOSKELETAL: No joint pain or swelling  PSYCHIATRIC: No depression, anxiety  HEME/LYMPH: No easy bruising, or bleeding gums  ALLERGY AND IMMUNOLOGIC: No hives or eczema	    MEDICATIONS  (STANDING):  calcium carbonate    500 mG (Tums) Chewable 1 Tablet(s) Chew two times a day  chlorhexidine 2% Cloths 1 Application(s) Topical daily  dextrose 5% + sodium chloride 0.9%. 1000 milliLiter(s) (70 mL/Hr) IV Continuous <Continuous>  ergocalciferol 04965 Unit(s) Oral <User Schedule>  pantoprazole  Injectable 40 milliGRAM(s) IV Push daily  polyethylene glycol 3350 17 Gram(s) Oral two times a day  simvastatin 10 milliGRAM(s) Oral at bedtime    MEDICATIONS  (PRN):  melatonin 3 milliGRAM(s) Oral at bedtime PRN Insomnia  ondansetron Injectable 4 milliGRAM(s) IV Push three times a day PRN Nausea and/or Vomiting      Vital Signs Last 24 Hrs  T(C): 36.5 (09 Jun 2025 04:29), Max: 36.5 (08 Jun 2025 11:32)  T(F): 97.7 (09 Jun 2025 04:29), Max: 97.7 (08 Jun 2025 11:32)  HR: 72 (09 Jun 2025 04:29) (70 - 80)  BP: 120/58 (09 Jun 2025 04:29) (120/58 - 150/56)  BP(mean): --  RR: 18 (09 Jun 2025 04:29) (18 - 18)  SpO2: 95% (09 Jun 2025 04:29) (94% - 95%)    Parameters below as of 09 Jun 2025 04:29  Patient On (Oxygen Delivery Method): room air      CAPILLARY BLOOD GLUCOSE        I&O's Summary    08 Jun 2025 07:01  -  09 Jun 2025 07:00  --------------------------------------------------------  IN: 2457 mL / OUT: 0 mL / NET: 2457 mL          Appearance: Normal	  HEENT:   Normal oral mucosa, PERRL, EOMI	  Lymphatic: No lymphadenopathy  Cardiovascular: Normal S1 S2, No JVD  Respiratory: Lungs clear to auscultation	  Gastrointestinal:  Soft, Non-tender, + BS	  Skin: No rash, No ecchymoses	  Extremities:     LABS:                        10.2   3.13  )-----------( 218      ( 09 Jun 2025 04:55 )             32.3     06-09    143  |  110[H]  |  5[L]  ----------------------------<  106[H]  3.9   |  23  |  0.58    Ca    8.3[L]      09 Jun 2025 04:55  Phos  2.1     06-08  Mg     2.0     06-08    TPro  5.8[L]  /  Alb  3.6  /  TBili  0.7  /  DBili  x   /  AST  19  /  ALT  20  /  AlkPhos  58  06-09          Urinalysis Basic - ( 09 Jun 2025 04:55 )    Color: x / Appearance: x / SG: x / pH: x  Gluc: 106 mg/dL / Ketone: x  / Bili: x / Urobili: x   Blood: x / Protein: x / Nitrite: x   Leuk Esterase: x / RBC: x / WBC x   Sq Epi: x / Non Sq Epi: x / Bacteria: x              Thyroid Stimulating Hormone, Serum: 5.26 uIU/mL (06-07 @ 07:10)          Consultant(s) Notes Reviewed:      Care Discussed with Consultants/Other Providers:

## 2025-07-09 ENCOUNTER — RX RENEWAL (OUTPATIENT)
Age: 80
End: 2025-07-09

## 2025-09-09 ENCOUNTER — APPOINTMENT (OUTPATIENT)
Dept: MAMMOGRAPHY | Facility: CLINIC | Age: 80
End: 2025-09-09
Payer: MEDICARE

## 2025-09-09 ENCOUNTER — APPOINTMENT (OUTPATIENT)
Dept: ULTRASOUND IMAGING | Facility: CLINIC | Age: 80
End: 2025-09-09
Payer: MEDICARE

## 2025-09-09 PROCEDURE — 77063 BREAST TOMOSYNTHESIS BI: CPT | Mod: 26

## 2025-09-09 PROCEDURE — 76641 ULTRASOUND BREAST COMPLETE: CPT | Mod: 26,50,GA

## 2025-09-09 PROCEDURE — 77067 SCR MAMMO BI INCL CAD: CPT | Mod: 26

## 2025-09-10 ENCOUNTER — APPOINTMENT (OUTPATIENT)
Dept: MAMMOGRAPHY | Facility: CLINIC | Age: 80
End: 2025-09-10

## 2025-09-10 ENCOUNTER — APPOINTMENT (OUTPATIENT)
Dept: ULTRASOUND IMAGING | Facility: CLINIC | Age: 80
End: 2025-09-10

## 2025-09-11 ENCOUNTER — APPOINTMENT (OUTPATIENT)
Dept: ULTRASOUND IMAGING | Facility: IMAGING CENTER | Age: 80
End: 2025-09-11
Payer: MEDICARE

## 2025-09-11 PROCEDURE — 76642 ULTRASOUND BREAST LIMITED: CPT | Mod: 26,RT

## (undated) DEVICE — SENSOR O2 FINGER ADULT

## (undated) DEVICE — SUCTION YANKAUER NO CONTROL VENT

## (undated) DEVICE — FOLEY HOLDER STATLOCK 2 WAY ADULT

## (undated) DEVICE — BITE BLOCK ADULT 20 X 27MM (GREEN)

## (undated) DEVICE — BALLOON US ENDO

## (undated) DEVICE — CATH IV SAFE BC 20G X 1.16" (PINK)

## (undated) DEVICE — TUBING IV SET GRAVITY 3Y 100" MACRO

## (undated) DEVICE — ATTACHMENT DISTAL 4X11.35MM

## (undated) DEVICE — TUBING SUCTION 20FT

## (undated) DEVICE — Device

## (undated) DEVICE — TUBING SUCTION CONN 6FT STERILE

## (undated) DEVICE — FORCEP RADIAL JAW 4 JUMBO 2.8MM 3.2MM 240CM ORANGE DISP

## (undated) DEVICE — SOL INJ NS 0.9% 500ML 2 PORT

## (undated) DEVICE — PACK IV START WITH CHG

## (undated) DEVICE — SYR ALLIANCE II INFLATION 60ML

## (undated) DEVICE — CATH IV SAFE BC 22G X 1" (BLUE)